# Patient Record
(demographics unavailable — no encounter records)

---

## 2019-08-09 NOTE — NUR
HAND-OFF: 

Report given to Isidoro Aleman RN. Patient resting comfortably, belonging sheet 
completed without patient signature due to dressing.

## 2019-08-09 NOTE — DIAGNOSTIC IMAGING REPORT
Indication: Headache

 

Technique: Continuous helical CT scanning of the head was performed utilizing

automated exposure control without intravenous contrast material. Axial and coronal

reconstructions were obtained.

 

Comparison: None

 

CT dose: Total DLP 1337.05 mGycm; CTDI vol 70.38 mGy

 

Findings: Evaluation limited due to patient motion. Note that subtle abnormalities

can be missed. Within these limitations: 

 

There is no acute intracranial hemorrhage, mass effect or cortical edema. No shift of

the midline structures. The ventricles, cisterns and sulci are prominent consistent

with atrophy. Periventricular hypoattenuation is seen, a nonspecific finding. There

is a more focal area of encephalomalacia in the right lateral frontal lobe which may

be related to chronic ischemia or injury. There is left supraorbital soft tissue

swelling. No depressed calvarial fracture. Mastoid air cells and paranasal sinuses

clear. Globes are symmetric. No infiltration of the conal fat bilaterally.

 

 

IMPRESSION: 

Exam limited by motion artifact. Within this limitations:

 

No evidence of acute intracranial hemorrhage, mass effect or cortical edema. MRI may

be obtained for more sensitive evaluation as clinically indicated.

 

Atrophy and nonspecific periventricular hypoattenuation suggestive of chronic

ischemic microvascular changes.

 

Likely chronic infarct in the right frontal lobe.

 

Left supraorbital soft tissue swelling. No depressed skull fracture.

 

This corresponds with the statrad preliminary report. 

 

 The CT scanner at [facility] is accredited by the [employer] College of

Radiology and the scans are performed using protocols designed to limit radiation

exposure to as low as reasonably achievable to attain images of sufficient resolution

adequate for diagnostic evaluation.

## 2019-08-09 NOTE — CONSULTATION
Consult Note


Consult Note





asked to eval for renal failure and anemia





Patient is a 84-year-old male who presents after increased finger pain.  

Patient reports having a recent fall.  He states he had injury approximately 1/

2 hours ago after tripping on a sidewalk.  He reports increased pain to the 

right hand ring finger.  He reports falling and hitting his head.  He denies 

any loss of consciousness.  He denies any headache.  He denies taking any 

anticoagulation.  He denies any numbness.  He reports having prior history of 

CVA as well as hypertension.  He states he has had an appendectomy in the past. 

patient reports having a prior vascular procedure due to CVA at [facility] in [place].Patient reports having increased shortness of breath.  He 

cannot state when this began.  He states he had been having increased trouble 

breathing for several days at least.He reports having prior left-sided weakness 

resulting from CVA.  He normally walks with a cane.





     No Known Allergies (Unverified , 8/9/19)








Hx Hypertension:  Yes


Hx Cerebrovascular Accident:  Yes





patient interviewed-


examined


data reviewed


Assessment/Plan





Renal Failure-


? acute obn top of chronic


Anemia


HTN


h/o CVA


Rt 4th finger laceration: reason for admission











Anemia farley


U Na


U Analysis


BP control


Avoid Nephrotoxics











Humberto De Jesus MD Aug 9, 2019 15:19

## 2019-08-09 NOTE — DIAGNOSTIC IMAGING REPORT
Indication:Elevated Bun and Creatinine.

 

Technique: Grayscale and duplex Doppler imaging of the kidneys performed.

 

Comparison: None

 

Findings:

 

Kidneys are echogenic. There are multiple cysts bilaterally. There is no

hydronephrosis.. The right kidney measures 9.4 cm. in length. The left kidney

measures 9.4 cm. in length.

 

The IVC is patent. Urinary bladder is unremarkable. Prostate is 4.4 x 3.1 x 4.7 cm

and appears heterogeneous.

 

IMPRESSION:

 

Medical renal disease

 

Prostate enlargement

## 2019-08-09 NOTE — DIAGNOSTIC IMAGING REPORT
Indication: Pain status post injury

 

Technique: XRAY Hand Complete R

 

Comparison: None

 

Findings: There is a dislocation at the fourth proximal interphalangeal joint. No

definite/displaced associated acute fracture. There are degenerative changes

involving the interphalangeal joints. Alignment of the carpal bones is intact. No

radiopaque foreign body.

 

Impression: Dislocation at the fourth proximal interphalangeal joint. No

definite/displaced associated acute fracture.

 

Osteoarthrosis.

 

This corresponds with the preliminary interpretation of the treating ER physician, as

documented in the electronic medical record.

## 2019-08-09 NOTE — NUR
ED Nurse Note:



arnold Trinh, RN accepting RN Javier is on the phone with doctor at this moment. he 
will call me back.

## 2019-08-09 NOTE — HISTORY AND PHYSICAL REPORT
DATE OF ADMISSION:  08/09/2019

CHIEF COMPLAINT:  Fall with sustained injury to the right hand.



HISTORY OF PRESENT ILLNESS:  This is an 84-year-old very delightful 

American gentleman with past medical history significant for hypertension,

history of CVA with left-sided weakness, and appendectomy, who has

presented to the emergency room complaining about the right fourth finger

pain after fall sustaining injury to the right hand.  The patient fell

about 30 minutes prior to arrival to the emergency room, trip and fall on

the sidewalk, hit his head.  Denies any loss of consciousness.  Denies any

bowel or urinary incontinence.  Denies any double vision.  He complained

about the shortness of breath, however, denies any chest pain, nausea, or

vomiting.  Shortly after initial evaluation in the emergency room, the

patient was admitted to the hospital with mechanical fall with right hand

fourth finger laceration and the PIP dislocation status post reduction.



PAST MEDICAL HISTORY/PAST SURGICAL HISTORY:  As above.  History of

hypertension, CVA with the left-sided weakness, and appendectomy.



MEDICATIONS AT HOME:  Significant for Norvasc 10 mg p.o. daily.



ALLERGIES:  No known drug allergies.



SOCIAL HISTORY:  Denies any smoking, alcohol, or drugs at this

time.



FAMILY HISTORY:  Noncontributory.



REVIEW OF SYSTEMS:  Mostly as above.  Denies any dysuria, frequency, or

hematuria.  Denies any hemoptysis or hematochezia.  Denies any suicidal or

homicidal ideation.



PHYSICAL EXAMINATION:

VITAL SIGNS:  On admission from the ER, temperature 98.1, pulse of 100,

respirations 14, and initial blood pressure 203/186, repeat blood pressure

was 196/80.

GENERAL:  The patient is awake and responsive, in no acute

distress.

HEAD AND NECK:  Pupils are reactive to light.  Extraocular movements

intact.

NECK:  Supple.  No JVD.

LUNGS:  Good air entry.  No wheezing or rales.

HEART:  Reveals S1, S2.  Distant heart sounds.  No gallops.

ABDOMEN:  Soft, nondistended, and nontender.  Positive bowel

sounds.

EXTREMITIES:  No cyanosis, clubbing, or edema.  Right hand fourth finger

with laceration and dislocation, finger has been reduced.

RECTAL/GENITOURINARY:  Refused and deferred.

PSYCHIATRIC:  Mood and affect is intact.



LABORATORY DATA:  On admission from the ER is significant for WBC of 7.8,

hemoglobin 9.2, hematocrit 29, and platelets is 218,000.  Sodium 140,

potassium 4.2, chloride 108, bicarbonate 24, BUN 20, and creatinine 1.9.

Glucose is 90.  Uric acid is 6.7.  Calcium is 8.8.  Total CK is 821.  PT

of 10, INR 1.0,  and PTT of 28.  The patient's x-ray of the hand was noted

to be dislocation of the fourth proximal interference joint.  No

definitive displaced or associated acute fracture or osteoarthritis.  The

patient had a CT of the head was done, noted to have limitation due to the

movement artifact.  No evidence of acute intracranial hemorrhage, mass

effect, or cortical edema.  MRI may be obtained for more sensitivity

evaluation, atrophy with nonspecific periventricular hypoattenuation

suggestive of the chronic ischemic microvascular disease, chronic

infarction in the right frontal lobe, and left supraorbital soft tissue

swelling.  No depressed skull fracture.  Chest x-ray, nonspecific mild

interstitial prominence, possible chronic.  No definitive focal

consolidation, pleural effusion, or pneumothorax.  Borderline

cardiomegaly, possibly exaggerated by AP technique, and bilateral hilum

prominence, most likely related to the cachectic pulmonary vasculature.



ASSESSMENT:

1. Status post mechanical fall with the right hand fourth finger

laceration as well as dislocation status post reduction.

2. Hypertension, uncontrolled.

3. History of CVA with left hemiparesis.

4. Chronic kidney disease.

5. Anemia.



PLAN:  Admit the patient to monitor unit.  We will follow up with Dr. Tristan consultation as well as Dr. Davila from Infectious Disease.

Follow up with 2D echo.  Code status is Full Code.  DVT prophylaxis with

heparin subcutaneous.  We will follow up with the anemia workup.  We will

hold off on aspirin at this time due to the patient's anemia.  Follow up

with the PT and OT evaluation.  Consider discharge to the nursing facility

once the patient is ready to go.









  ______________________________________________

  Frandy Vuong M.D.





DR:  BEREKET

D:  08/09/2019 14:19

T:  08/09/2019 22:47

JOB#:  [id number]

CC:

## 2019-08-09 NOTE — DIAGNOSTIC IMAGING REPORT
Indication: pain, dislocation s/p reduction

 

Technique: XRAY Hand Ltd 2v R

 

Comparison: Earlier the same day

 

Findings: Status post assess for reduction and splinting of the previously seen

dislocation at the fourth proximal interphalangeal joint. No definite/displaced acute

fractures identified. Additional findings unchanged from exam earlier today.

 

Impression: Successful reduction of the previously seen dislocation at the fourth

proximal interphalangeal joint. No definite/displaced acute fracture.

## 2019-08-09 NOTE — NUR
Social Work

This Sw received a consult due to patient is homeless. This SW met with patient who remains 
alert/oriented, making his own decisions. Patient remains ambulatory with cane and managing 
his own ADLs independently. Patient explains he moved here from [facility], stating he retired one 
year ago as a  (transported to [place] via [employer] bus). This SW 
recommended Board/Care placement and contacted Preeti () [facility] 
who explains she has openings and will evaluate patient on Monday AM. Patient receives 
$1800/month in Social Security. Preeti explains she can transport patient to the facility 
(will need to evaluate patient first). 



[facility] 

[address] [place] [address]



Pending P.T to evaluate, according to latonia Herrera. If patient requires SNF, please contact 
Preeti prior to Monday AM to inform. latonia Herrera informed.

## 2019-08-09 NOTE — CONSULTATION
DATE OF CONSULTATION:  08/09/2019

CONSULTING PHYSICIAN:  Jaycob Mcneal M.D.



REFERRING PHYSICIAN:  Frandy Vuong M.D.



CHIEF COMPLAINT:  Right index PIP joint dislocation.



HISTORY OF PRESENT ILLNESS:  The patient is a pleasant 84-year-old

gentleman, who sustained a mechanical fall, diagnosed with the dislocation

of the index finger with an open laceration.  He underwent irrigation and

debridement and primary closure and reduction in the ER.  Orthopedic

consultation obtained for further care and recommendation.



PAST MEDICAL HISTORY:  Significant for COPD.



PAST SURGICAL HISTORY:  Reviewed in the intake chart.



MEDICATIONS:  Reviewed in the intake chart.



ALLERGIES:  Reviewed in the intake chart.



SOCIAL HISTORY:  Reviewed in the intake chart.



PHYSICAL EXAMINATION:

GENERAL:  The patient is currently in the ER bed.  He is resting

comfortably in bed.  He is getting a breathing treatment.

EXTREMITIES:  Right hand examination shows a dressing in place.



IMAGING:  Preop pre-reduction films showed dorsal dislocation of the PIP

joint, index finger.  Postreduction films showed that the fracture is

reduced.



ASSESSMENT:  Right dorsal dislocation of PIP joint, index finger.



DISCUSSION:  At this point, it looks like the reduction was successful.  He

should be maintained on oral Keflex.  He needs follow up in about 10 to 14

days for suture removal.  Signs and symptoms for infection discussed with

the patient, particularly given his recent COPD exacerbation has increased

risk of infection, but I think it is pretty vascular and it should respond

with IV antibiotics.  I have discussed the case with Dr. Vuong.  Please

re-consult with me as needed during this admission if any new issues

arise.









  ______________________________________________

  Jaycob Mcneal M.D.





DR:  ATA

D:  08/09/2019 08:49

T:  08/09/2019 16:18

JOB#:  [id number]

CC:

## 2019-08-09 NOTE — NUR
ED Nurse Note:

Patient presents BIBA from homeless, with complaints of fall and injury to left 
hand. 

-------------------------------------------------------------------------------

Addendum: 08/09/19 at 0527 by JACKY

-------------------------------------------------------------------------------

ED Nurse Note:

Right hand, fourth finger.

## 2019-08-09 NOTE — NUR
ED Nurse Note:



Received patient in bed receiving breathing treatment by RT at the bedside. pt 
denied pain. aao x3-4 but fatigued. splint present from previous shift on Rt 
hand. bp high as documented. ERMD made aware.

## 2019-08-09 NOTE — EMERGENCY ROOM REPORT
History of Present Illness


General


Chief Complaint:  Upper Extremity Injury


Source:  Patient





Present Illness


HPI


Patient is a 84-year-old male who presents after increased finger pain.  

Patient reports having a recent fall.  He states he had injury approximately 1/

2 hours ago after tripping on a sidewalk.  He reports increased pain to the 

right hand ring finger.  He reports falling and hitting his head.  He denies 

any loss of consciousness.  He denies any headache.  He denies taking any 

anticoagulation.  He denies any numbness.  He reports having prior history of 

CVA as well as hypertension.  He states he has had an appendectomy in the past. 

patient reports having a prior vascular procedure due to CVA at [facility] in [place].Patient reports having increased shortness of breath.  He 

cannot state when this began.  He states he had been having increased trouble 

breathing for several days at least.He reports having prior left-sided weakness 

resulting from CVA.  He normally walks with a cane.


Allergies:  


Coded Allergies:  


     No Known Allergies (Unverified , 8/9/19)





Patient History


Reviewed Nursing Documentation:  PMH: Agreed; PSxH: Agreed





Nursing Documentation-PMH


Past Medical History:  No History, Except For


Hx Hypertension:  Yes


Hx Cerebrovascular Accident:  Yes





Review of Systems


Constitutional:  Denies: fever


ENT:  Reports: hearing loss - longstanding


Respiratory:  Denies: shortness of breath


Cardiovascular:  Denies: syncope


Gastrointestinal:  Denies: no symptoms, see HPI, nausea, vomiting


Skin:  Reports: other - laceration


Neurological:  Reports: no symptoms


Endocrine:  Denies: no symptoms, see HPI, excessive sweating, flushing, 

intolerance to temperature, increased thirst, increased urine, unexplained 

weight loss, other





Physical Exam





Vital Signs








  Date Time  Temp Pulse Resp B/P (MAP) Pulse Ox O2 Delivery O2 Flow Rate FiO2


 


8/9/19 05:01 98.1 100 14 203/186 (192) 98 Room Air  








General Appearance:  alert, GCS 15, Chronically Ill


Head:  normocephalic


ENT:  other - decreased hearing


Neck:  limited range of motion


Respiratory:  accessory muscle use, stridor


Cardiovascular #1:  normal inspection, regular rate, rhythm


Gastrointestinal:  normal inspection


Musculoskeletal:  other - kyphosis


Neurologic:  normal inspection, alert, oriented x3, responsive, motor weakness 

- left upper extremity slight weakness


Psychiatric:  normal inspection, judgement/insight normal


Skin:  normal color, laceration - finger laceration





Procedures


Joint Reduction


Joint Reduction :  


   Consent:  Emergent


   Joint Reduction Site:  other - finger


   Procedural Sedation:  No


   Reduction Attempts:  One


   Pre-Procedure NV Exam:  Yes


   Post-Procedure NV Exam:  Yes


   Post Joint Reduction Film:  joint reduced


   Patient Tolerated:  Well


   Complications:  None





Medical Decision Making


Diagnostic Impression:  


 Primary Impression:  


 Laceration of finger of right hand


 Additional Impressions:  


 Dislocated finger


 Stridor


 History of CVA (cerebrovascular accident)


ER Course


Patient is an 84-year-old male who presented after fall.  Differential 

diagnosis include was not limited to fracture, dislocation, contusion, strain 

among others.  Because of complexity of patient's case laboratory testing and 

imaging studies were ordered.  X-ray imaging of the right hand 3 views 

interpreted by me showed dislocation to the ring finger PIP joint.  Patient was 

noted to have laceration overlying this dislocation.  Laceration was irrigated 

copiously with sterile water.  Digital block was performed with 3 mL's of 0.5% 

bupivacaine.  Patient tolerated well.  Joint was reduced with axial traction.  

Wound was again irrigated.  Patient given IV Ancef.  He was noted to have some 

stridor and was given breathing treatment.Chest x-ray 1 view interpreted by me 

showed normal cardiac size with vascular congestion.  CT of the head read by 

radiology showed no evidence of acute fracture or intracranial hemorrhage.Dr. Frandy Vuong was contacted for inpatient management.  Dr.Raj Mcneal was contacted 

for orthopedic consult.





Labs








Test


  8/9/19


06:14


 


White Blood Count


  7.8 K/UL


(4.8-10.8)


 


Red Blood Count


  3.45 M/UL


(4.70-6.10)


 


Hemoglobin


  9.2 G/DL


(14.2-18.0)


 


Hematocrit


  29.8 %


(42.0-52.0)


 


Mean Corpuscular Volume 86 FL (80-99) 


 


Mean Corpuscular Hemoglobin


  26.8 PG


(27.0-31.0)


 


Mean Corpuscular Hemoglobin


Concent 31.0 G/DL


(32.0-36.0)


 


Red Cell Distribution Width


  14.0 %


(11.6-14.8)


 


Platelet Count


  218 K/UL


(150-450)


 


Mean Platelet Volume


  5.3 FL


(6.5-10.1)


 


Neutrophils (%) (Auto)


  72.4 %


(45.0-75.0)


 


Lymphocytes (%) (Auto)


  13.6 %


(20.0-45.0)


 


Monocytes (%) (Auto)


  9.3 %


(1.0-10.0)


 


Eosinophils (%) (Auto)


  3.6 %


(0.0-3.0)


 


Basophils (%) (Auto)


  1.1 %


(0.0-2.0)


 


Prothrombin Time


  10.2 SEC


(9.30-11.50)


 


Prothromb Time International


Ratio 1.0 (0.9-1.1) 


 


 


Activated Partial


Thromboplast Time 28 SEC (23-33) 


 


 


Sodium Level


  140 MMOL/L


(136-145)


 


Potassium Level


  4.2 MMOL/L


(3.5-5.1)


 


Chloride Level


  108 MMOL/L


()


 


Carbon Dioxide Level


  24 MMOL/L


(21-32)


 


Anion Gap


  8 mmol/L


(5-15)


 


Blood Urea Nitrogen


  20 mg/dL


(7-18)


 


Creatinine


  1.9 MG/DL


(0.55-1.30)


 


Estimat Glomerular Filtration


Rate  mL/min (>60) 


 


 


Glucose Level


  90 MG/DL


()


 


Calcium Level


  8.8 MG/DL


(8.5-10.1)


 


Total Bilirubin


  0.5 MG/DL


(0.2-1.0)


 


Aspartate Amino Transf


(AST/SGOT) 36 U/L (15-37) 


 


 


Alanine Aminotransferase


(ALT/SGPT) 27 U/L (12-78) 


 


 


Alkaline Phosphatase


  55 U/L


()


 


Total Protein


  7.0 G/DL


(6.4-8.2)


 


Albumin


  3.2 G/DL


(3.4-5.0)


 


Globulin 3.8 g/dL 


 


Albumin/Globulin Ratio 0.8 (1.0-2.7) 











Last Vital Signs








  Date Time  Temp Pulse Resp B/P (MAP) Pulse Ox O2 Delivery O2 Flow Rate FiO2


 


8/9/19 05:07 98.1 78 14 203/186 98 Room Air  








Status:  improved


Disposition:  ADMITTED AS INPATIENT


Condition:  Stable











Silvestre Hernandez MD Aug 9, 2019 05:27

## 2019-08-09 NOTE — CONSULTATION
History of Present Illness


General


Date patient seen:  Aug 9, 2019





Present Illness


HPI


 84-year-old male with hx of HTN, CVA with left sided weaknes , presented to  

after increased finger pain.  He states he had injury approximately 1/2 hours 

ago after tripping on a sidewalk.  He reports falling and hitting his head. 

Patient reports having increased shortness of breath.   He states he had been 

having increased trouble breathing for several days at least.  His  CXR showed 

possible infiltrate.  His labs revealed ATN as well.  He is admitted to 

telemetry for further evaluation.


Allergies:  


Coded Allergies:  


     No Known Allergies (Unverified , 8/9/19)





Medication History


Miscellaneous Medications


Unable to Obtain Medications (Unable To Obtain Meds), (Reported)





Patient History


Healthcare decision maker





Resuscitation status


Full Code


Advanced Directive on File








Past Medical/Surgical History


Past Medical/Surgical History:  


(1) History of hypertension


(2) Laceration of finger of right hand


(3) History of CVA (cerebrovascular accident)





Review of Systems


All Other Systems:  negative except mentioned in HPI





Physical Exam


General Appearance:  thin


Lines, tubes and drains:  peripheral, PICC


HEENT:  normocephalic, atraumatic


Neck:  non-tender, supple


Respiratory/Chest:  chest wall non-tender, lungs clear


Breasts:  no masses


Cardiovascular/Chest:  normal peripheral pulses


Abdomen:  normal bowel sounds


Genitourinary/Rectal:  normal genital exam


Extremities:  normal range of motion


Neurologic:  CNs II-XII grossly normal





Last 24 Hour Vital Signs








  Date Time  Temp Pulse Resp B/P (MAP) Pulse Ox O2 Delivery O2 Flow Rate FiO2


 


8/9/19 12:00 97.6 84 18 169/80 (109) 94   


 


8/9/19 10:53      Room Air  


 


8/9/19 08:51  73 13  100 Room Air  21 8/9/19 08:42  79  193/67    


 


8/9/19 08:26 98.3 79 18 193/66 100 Room Air  


 


8/9/19 08:24  72 16  100 Room Air  21 8/9/19 08:12  66 18   Room Air  21 8/9/19 08:12 98.3 66 18 199/60 99 Room Air  21 8/9/19 07:26 98.1 78 18 184/57 99 Room Air  21 8/9/19 06:01  73 20  99 Room Air  21


 


8/9/19 05:52  89 18  99 Room Air  21


 


8/9/19 05:52  89 18  99 Room Air  21


 


8/9/19 05:07 98.1 78 14 203/186 98 Room Air  


 


8/9/19 05:01 98.1 100 14 203/186 (192) 98 Room Air  











Laboratory Tests








Test


  8/9/19


06:14


 


White Blood Count


  7.8 K/UL


(4.8-10.8)


 


Red Blood Count


  3.45 M/UL


(4.70-6.10)  L


 


Hemoglobin


  9.2 G/DL


(14.2-18.0)  L


 


Hematocrit


  29.8 %


(42.0-52.0)  L


 


Mean Corpuscular Volume 86 FL (80-99)  


 


Mean Corpuscular Hemoglobin


  26.8 PG


(27.0-31.0)  L


 


Mean Corpuscular Hemoglobin


Concent 31.0 G/DL


(32.0-36.0)  L


 


Red Cell Distribution Width


  14.0 %


(11.6-14.8)


 


Platelet Count


  218 K/UL


(150-450)


 


Mean Platelet Volume


  5.3 FL


(6.5-10.1)  L


 


Neutrophils (%) (Auto)


  72.4 %


(45.0-75.0)


 


Lymphocytes (%) (Auto)


  13.6 %


(20.0-45.0)  L


 


Monocytes (%) (Auto)


  9.3 %


(1.0-10.0)


 


Eosinophils (%) (Auto)


  3.6 %


(0.0-3.0)  H


 


Basophils (%) (Auto)


  1.1 %


(0.0-2.0)


 


Prothrombin Time


  10.2 SEC


(9.30-11.50)


 


Prothromb Time International


Ratio 1.0 (0.9-1.1)  


 


 


Activated Partial


Thromboplast Time 28 SEC (23-33)


 


 


Sodium Level


  140 MMOL/L


(136-145)


 


Potassium Level


  4.2 MMOL/L


(3.5-5.1)


 


Chloride Level


  108 MMOL/L


()  H


 


Carbon Dioxide Level


  24 MMOL/L


(21-32)


 


Anion Gap


  8 mmol/L


(5-15)


 


Blood Urea Nitrogen


  20 mg/dL


(7-18)  H


 


Creatinine


  1.9 MG/DL


(0.55-1.30)  H


 


Estimat Glomerular Filtration


Rate  mL/min (>60)  


 


 


Glucose Level


  90 MG/DL


()


 


Calcium Level


  8.8 MG/DL


(8.5-10.1)


 


Total Bilirubin


  0.5 MG/DL


(0.2-1.0)


 


Aspartate Amino Transf


(AST/SGOT) 36 U/L (15-37)


 


 


Alanine Aminotransferase


(ALT/SGPT) 27 U/L (12-78)


 


 


Alkaline Phosphatase


  55 U/L


()


 


Total Protein


  7.0 G/DL


(6.4-8.2)


 


Albumin


  3.2 G/DL


(3.4-5.0)  L


 


Globulin 3.8 g/dL  


 


Albumin/Globulin Ratio


  0.8 (1.0-2.7)


L











Microbiology








 Date/Time


Source Procedure


Growth Status


 


 


 8/9/19 08:50


Rectum  Received








Height (Feet):  5


Height (Inches):  10.00


Weight (Pounds):  150


Medications





Current Medications








 Medications


  (Trade)  Dose


 Ordered  Sig/Arlen


 Route


 PRN Reason  Start Time


 Stop Time Status Last Admin


Dose Admin


 


 Acetaminophen


  (Tylenol)  650 mg  Q4H  PRN


 ORAL


 T>100.5  8/9/19 11:00


 9/8/19 10:59   


 


 


 Albuterol/


 Ipratropium


  (Albuterol/


 Ipratropium)  3 ml  Q4H  PRN


 HHN


 Shortness of Breath  8/9/19 11:00


 8/14/19 10:59   


 


 


 Dextrose


  (Dextrose 50%)  25 ml  Q30M  PRN


 IV


 Hypoglycemia  8/9/19 11:00


 9/8/19 10:59   


 


 


 Dextrose


  (Dextrose 50%)  50 ml  Q30M  PRN


 IV


 Hypoglycemia  8/9/19 11:00


 9/8/19 10:59   


 


 


 Heparin Sodium


  (Porcine)


  (Heparin 5000


 units/ml)  5,000 units  EVERY 12  HOURS


 SUBQ


   8/9/19 11:00


 9/8/19 10:59  8/9/19 11:30


 


 


 Morphine Sulfate


  (Morphine


 Sulfate)  2 mg  Q4H  PRN


 IVP


 Severe Pain (Pain Scale 7-10)  8/9/19 11:00


 8/16/19 10:59   


 


 


 Ondansetron HCl


  (Zofran)  4 mg  Q6H  PRN


 IVP


 Nausea & Vomiting  8/9/19 11:00


 9/8/19 10:59   


 


 


 Polyethylene


 Glycol


  (Miralax)  17 gm  DAILYPRN  PRN


 ORAL


 Constipation  8/9/19 11:00


 9/8/19 10:59   


 


 


 Vancomycin HCl


  (Vanco rx to


 dose)  1 ea  DAILY  PRN


 MISC


 .  8/9/19 11:15


 9/8/19 11:14   


 


 


 Vancomycin HCl/


 Dextrose  275 ml @ 


 137.5 mls/


 hr  ONCE  ONCE


 IVPB


   8/9/19 12:30


 8/9/19 14:29   


 











Assessment/Plan


Problem List:  


(1) History of hypertension


ICD Codes:  Z86.79 - Personal history of other diseases of the circulatory 

system


SNOMED:  819900485


(2) Pneumonia


ICD Codes:  J18.9 - Pneumonia, unspecified organism


SNOMED:  951809048


(3) History of CVA (cerebrovascular accident)


ICD Codes:  Z86.73 - Personal history of transient ischemic attack (TIA), and 

cerebral infarction without residual deficits


SNOMED:  910975588, 924841041


Assessment/Plan:


check sputum


iv abc


respiratory treatment


CT of chest to evaluate the abnormality on the CXR


social service consult


dvt prophylaxis


CT of neck to evaluate possible stridor in ER


Renal w/u











Kolton Tristan MD Aug 9, 2019 12:21

## 2019-08-09 NOTE — NUR
NURSE NOTES:



Received report from LILIANA Herrera. Pt is resting in bed. In no acute distress. IV line intact and 
patent. Bed in lowest position, call light within reach. Will continue plan of care.

## 2019-08-09 NOTE — DIAGNOSTIC IMAGING REPORT
Indication: Shortness of breath

 

Technique: XRAY Chest 1v

 

Comparison: None

 

Findings: Heart appears borderline enlarged. Hilar prominence is noted which is most

likely due to ectatic pulmonary vasculature. Atherosclerotic calcifications are noted

in a tortuous aorta. There is mild interstitial prominence, nonspecific. No definite

focal consolidation. No pleural effusion or pneumothorax. There are degenerative

changes in the spine. No acute osseous abnormality.

 

Impression: 

*  Nonspecific mild interstitial prominence, possibly chronic.

*  No definite focal consolidation, pleural effusion, pneumothorax or evidence of

alveolar edema.

*  Borderline cardiomegaly, possibly exaggerated by AP technique.

*  Bilateral hilar prominence most likely related to ectatic pulmonary vasculature.

Recommend routine follow-up chest CT to exclude the possibility of mass or

lymphadenopathy.

*  Atherosclerotic disease.

## 2019-08-09 NOTE — HISTORY & PHYSICAL
History and Physical


History & Physicial


Job# [id number]











Frandy Vuong MD Aug 9, 2019 14:22

## 2019-08-10 NOTE — NUR
NURSE NOTES:

Report received from LILIANA Whiteside. Patient asleep, easily awakened by voice. In RA. Denies any 
pain or SOB. Bed on lowest position, side rails upx2, brakes engaged. Call light within easy 
reach.

## 2019-08-10 NOTE — INFECTIOUS DISEASES PROG NOTE
Assessment/Plan


Assessment/Plan


Abx:


Cefepime x1 8/9


Ceftriaxone 8/9-


IV Vancomycin 8/9-





Assessment:


Afebrile


No leukocytosis





R 4th finger pan-2ry to PIP dislocation and laceration, s/p reduction


  -repeat R hand xray: Successful reduction of the previously seen dislocation 

at the fourth proximal interphalangeal joint. No definite/displaced acute 

fracture.


  -xray R hand:  Dislocation at the fourth proximal interphalangeal joint. No 

definite/displaced associated acute fracture.Osteoarthrosis.


 


s/p Fall


  -Head CT: Exam limited by motion artifact. Within this limitations:No 

evidence of acute intracranial hemorrhage, mass effect or cortical edema. MRI 

maybe obtained for more sensitive evaluation as clinically indicated. Atrophy 

and nonspecific periventricular hypoattenuation suggestive of chronic ischemic 

microvascular changes. Likely chronic infarct in the right frontal lobe. Left 

supraorbital soft tissue swelling. No depressed skull fracture.


 





Dyspnea


  -CXR:   Nonspecific mild interstitial prominence, possibly chronic. No 

definite focal consolidation, pleural effusion, pneumothorax or evidence of 

alveolar edema. Borderline cardiomegaly, possibly exaggerated by AP 

technique.Bilateral hilar prominence most likely related to ectatic pulmonary 

vasculature.








VIKASH, improving


   -u/a neg


   -Renal US:  Slightly increased echogenicity within the kidneys which can be 

seen in the setting of medical renal disease.  No hydronephrosis.  Cyst within 

the right renal upper pole.  No stones.  Unremarkable appearance of the 

bladder. 


 





CVA w L side weakness


HTN


 appendectomy





Plan:


-Continue prophylactic PO Keflex #2/3


    -8/9 IV Vancomycin, Ceftriaxone #1





-f/u cx


-Monitor CBC/CMP, temperatures


-wound care





Thank you for this consultation. Will continue to follow along with  you.





Discussed with RN.





Subjective


Allergies:  


Coded Allergies:  


     No Known Allergies (Unverified , 8/9/19)





Objective


Vital Signs





Last 24 Hour Vital Signs








  Date Time  Temp Pulse Resp B/P (MAP) Pulse Ox O2 Delivery O2 Flow Rate FiO2


 


8/10/19 09:29  83  153/82    


 


8/10/19 09:26  83  153/82    


 


8/10/19 09:00      Room Air  


 


8/10/19 08:00 98.4 83 18 153/82 (105) 95   


 


8/10/19 08:00  87      


 


8/10/19 06:45  82  164/79 (107)    


 


8/10/19 05:57    189/91    


 


8/10/19 04:00  71      


 


8/10/19 04:00 98.2 71 18 152/86 (108) 98   


 


8/10/19 00:00  75      


 


8/10/19 00:00 98.6 75 20 149/74 (99) 96   


 


8/9/19 21:22    109/70    


 


8/9/19 21:00      Room Air  


 


8/9/19 20:00 98.8 86 20 157/71 (99) 97   


 


8/9/19 20:00  86      


 


8/9/19 16:00 98.8 85 20 122/90 (101) 95   


 


8/9/19 16:00  84      


 


8/9/19 15:47  84  169/80    








Height (Feet):  5


Height (Inches):  10.00


Weight (Pounds):  150


Objective





General Appearance:  thin


Lines, tubes and drains:  peripheral, PICC


HEENT:  normocephalic, atraumatic


Neck:  non-tender, supple


Respiratory/Chest:  chest wall non-tender, lungs clear


Cardiovascular/Chest:  normal peripheral pulses


Abdomen:  normal bowel sounds


Extremities:  , laceration - R 4th finger laceration





Microbiology








 Date/Time


Source Procedure


Growth Status


 


 


 8/9/19 08:50


Rectum  Received











Laboratory Tests








Test


  8/9/19


15:22 8/10/19


06:25 8/10/19


07:15


 


Urine Color Pale yellow    


 


Urine Appearance Clear    


 


Urine pH 6 (4.5-8.0)    


 


Urine Specific Gravity


  1.005


(1.005-1.035) 


  


 


 


Urine Protein


  1+ (NEGATIVE)


H 


  


 


 


Urine Glucose (UA)


  Negative


(NEGATIVE) 


  


 


 


Urine Ketones


  1+ (NEGATIVE)


H 


  


 


 


Urine Blood


  Negative


(NEGATIVE) 


  


 


 


Urine Nitrite


  Negative


(NEGATIVE) 


  


 


 


Urine Bilirubin


  Negative


(NEGATIVE) 


  


 


 


Urine Urobilinogen


  Normal MG/DL


(0.0-1.0) 


  


 


 


Urine Leukocyte Esterase


  Negative


(NEGATIVE) 


  


 


 


Urine RBC


  0-2 /HPF (0 -


0)  H 


  


 


 


Urine WBC


  0-2 /HPF (0 -


0) 


  


 


 


Urine Squamous Epithelial


Cells None /LPF


(NONE/OCC) 


  


 


 


Urine Bacteria


  Occasional


/HPF (NONE) 


  


 


 


Urine Random Sodium


  91 mmol/L


() 


  


 


 


White Blood Count


  


  9.9 K/UL


(4.8-10.8) 


 


 


Red Blood Count


  


  3.39 M/UL


(4.70-6.10)  L 


 


 


Hemoglobin


  


  9.4 G/DL


(14.2-18.0)  L 


 


 


Hematocrit


  


  29.1 %


(42.0-52.0)  L 


 


 


Mean Corpuscular Volume  86 FL (80-99)   


 


Mean Corpuscular Hemoglobin


  


  27.6 PG


(27.0-31.0) 


 


 


Mean Corpuscular Hemoglobin


Concent 


  32.2 G/DL


(32.0-36.0) 


 


 


Red Cell Distribution Width


  


  13.8 %


(11.6-14.8) 


 


 


Platelet Count


  


  232 K/UL


(150-450) 


 


 


Mean Platelet Volume


  


  5.8 FL


(6.5-10.1)  L 


 


 


Neutrophils (%) (Auto)


  


  78.4 %


(45.0-75.0)  H 


 


 


Lymphocytes (%) (Auto)


  


  11.5 %


(20.0-45.0)  L 


 


 


Monocytes (%) (Auto)


  


  9.1 %


(1.0-10.0) 


 


 


Eosinophils (%) (Auto)


  


  0.4 %


(0.0-3.0) 


 


 


Basophils (%) (Auto)


  


  0.7 %


(0.0-2.0) 


 


 


Differential Total Cells


Counted 


  100  


  


 


 


Neutrophils % (Manual)  83 % (45-75)  H 


 


Lymphocytes % (Manual)  16 % (20-45)  L 


 


Monocytes % (Manual)  1 % (1-10)   


 


Eosinophils % (Manual)  0 % (0-3)   


 


Basophils % (Manual)  0 % (0-2)   


 


Band Neutrophils  0 % (0-8)   


 


Platelet Estimate  Adequate   


 


Platelet Morphology  Normal   


 


Red Blood Cell Morphology  Normal   


 


Erythrocyte Sedimentation Rate


  


  33 MM/HR


(0-20)  H 


 


 


Reticulocyte Count


  


  0.9 %


(0.5-2.0) 


 


 


Prothrombin Time


  


  10.2 SEC


(9.30-11.50) 10.2 SEC


(9.30-11.50)


 


Prothromb Time International


Ratio 


  1.0 (0.9-1.1)  


  1.0 (0.9-1.1)  


 


 


Activated Partial


Thromboplast Time 


  29 SEC (23-33)


  28 SEC (23-33)


 


 


Sodium Level


  


  144 MMOL/L


(136-145) 


 


 


Potassium Level


  


  4.3 MMOL/L


(3.5-5.1) 


 


 


Chloride Level


  


  111 MMOL/L


()  H 


 


 


Carbon Dioxide Level


  


  26 MMOL/L


(21-32) 


 


 


Anion Gap


  


  7 mmol/L


(5-15) 


 


 


Blood Urea Nitrogen


  


  20 mg/dL


(7-18)  H 


 


 


Creatinine


  


  1.7 MG/DL


(0.55-1.30)  H 


 


 


Estimat Glomerular Filtration


Rate 


   mL/min (>60)  


  


 


 


Glucose Level


  


  86 MG/DL


() 


 


 


Hemoglobin A1c


  


  6.1 %


(4.3-6.0)  H 


 


 


Uric Acid


  


  6.5 MG/DL


(2.6-7.2) 


 


 


Calcium Level


  


  8.3 MG/DL


(8.5-10.1)  L 


 


 


Phosphorus Level


  


  3.3 MG/DL


(2.5-4.9) 


 


 


Magnesium Level


  


  2.0 MG/DL


(1.8-2.4) 


 


 


Iron Level


  


  43 ug/dL


()  L 


 


 


Total Iron Binding Capacity


  


  193 ug/dL


(250-450)  L 


 


 


Percent Iron Saturation  22 % (15-50)   


 


Unsaturated Iron Binding


  


  150 ug/dL


(112-346) 


 


 


Total Bilirubin


  


  0.4 MG/DL


(0.2-1.0) 


 


 


Gamma Glutamyl Transpeptidase  8 U/L (5-85)   


 


Aspartate Amino Transf


(AST/SGOT) 


  29 U/L (15-37)


  


 


 


Alanine Aminotransferase


(ALT/SGPT) 


  25 U/L (12-78)


  


 


 


Alkaline Phosphatase


  


  47 U/L


() 


 


 


Lactate Dehydrogenase


  


  282 U/L


()  H 


 


 


Total Creatine Kinase


  


  553 U/L


()  H 


 


 


Total Protein


  


  6.3 G/DL


(6.4-8.2)  L 


 


 


Albumin


  


  2.7 G/DL


(3.4-5.0)  L 


 


 


Globulin  3.6 g/dL   


 


Albumin/Globulin Ratio


  


  0.8 (1.0-2.7)


L 


 


 


Carcinoembryonic Antigen  Pending   


 


Vitamin B12 Level


  


  257 PG/ML


(193-986) 


 


 


Folate


  


  10.9 NG/ML


(8.6-58.9) 


 


 


Thyroid Stimulating Hormone


(TSH) 


  2.276 uiU/mL


(0.358-3.740) 


 


 


Ferritin


  


  


  119 NG/ML


(8-388)











Current Medications








 Medications


  (Trade)  Dose


 Ordered  Sig/Arlen


 Route


 PRN Reason  Start Time


 Stop Time Status Last Admin


Dose Admin


 


 Acetaminophen


  (Tylenol)  650 mg  Q4H  PRN


 ORAL


 T>100.5  8/9/19 11:00


 9/8/19 10:59   


 


 


 Albuterol/


 Ipratropium


  (Albuterol/


 Ipratropium)  3 ml  Q4H  PRN


 HHN


 Shortness of Breath  8/9/19 11:00


 8/14/19 10:59   


 


 


 Amlodipine


 Besylate


  (Norvasc)  5 mg  BID


 ORAL


   8/10/19 09:00


 9/9/19 08:59  8/10/19 09:26


 


 


 Cephalexin


  (Keflex)  500 mg  EVERY 8  HOURS


 ORAL


   8/9/19 22:00


 8/16/19 21:59  8/10/19 05:53


 


 


 Clonidine HCl


  (Catapres Tab)  0.1 mg  Q4H  PRN


 ORAL


 SBP>160  8/10/19 06:59


 9/9/19 06:58   


 


 


 Dextrose


  (Dextrose 50%)  25 ml  Q30M  PRN


 IV


 Hypoglycemia  8/9/19 11:00


 9/8/19 10:59   


 


 


 Dextrose


  (Dextrose 50%)  50 ml  Q30M  PRN


 IV


 Hypoglycemia  8/9/19 11:00


 9/8/19 10:59   


 


 


 Folic Acid


  (Folate)  2 mg  DAILY


 ORAL


   8/10/19 09:15


 9/9/19 09:14  8/10/19 09:29


 


 


 Heparin Sodium


  (Porcine)


  (Heparin 5000


 units/ml)  5,000 units  EVERY 12  HOURS


 SUBQ


   8/9/19 11:00


 9/8/19 10:59  8/10/19 09:27


 


 


 Hydralazine HCl


  (Apresoline)  50 mg  Q8HR


 ORAL


   8/10/19 14:00


 9/8/19 17:59   


 


 


 Metoprolol


 Tartrate


  (Lopressor)  12.5 mg  Q12HR


 ORAL


   8/10/19 09:15


 9/9/19 09:14  8/10/19 09:29


 


 


 Morphine Sulfate


  (Morphine


 Sulfate)  2 mg  Q4H  PRN


 IVP


 Severe Pain (Pain Scale 7-10)  8/9/19 11:00


 8/16/19 10:59   


 


 


 Ondansetron HCl


  (Zofran)  4 mg  Q6H  PRN


 IVP


 Nausea & Vomiting  8/9/19 11:00


 9/8/19 10:59   


 


 


 Polyethylene


 Glycol


  (Miralax)  17 gm  DAILYPRN  PRN


 ORAL


 Constipation  8/9/19 11:00


 9/8/19 10:59   


 


 


 Sodium Chloride  1,000 ml @ 


 75 mls/hr  [id number] ONCE


 IV


   8/10/19 09:15


 8/10/19 22:34  8/10/19 09:30


 

















Alexa Davila M.D. Aug 10, 2019 12:06

## 2019-08-10 NOTE — NUR
NURSE NOTES:



Noted pt's BP was 189/91, gave Hydralazine dose as ordered. Checked BP again after 30 mins, 
BP decreased to 164/79. Notified Dr. Tristan who is in the unit. New orders noted and 
carried out. MD also said not to give anything else for now.

## 2019-08-10 NOTE — NUR
NURSE NOTES:

Received pt from LILIANA Calderón. Pt is awake and resting in bed in no acute distress. IV site 
intact and running. Bed locked in lowest position, call light within reach. Bed alarm on. 
Will continue with plan of care.

## 2019-08-10 NOTE — PULMONOLOGY PROGRESS NOTE
Assessment/Plan


Problems:  


(1) Pneumonia


(2) History of hypertension


(3) History of CVA (cerebrovascular accident)


Assessment/Plan


increase amlodipine


f/u on CT chest and neck


respiratory treatment


check electrolytes


dvt prophylaxis





Subjective


ROS Limited/Unobtainable:  No


Constitutional:  Reports: no symptoms


HEENT:  Repors: no symptoms


Respiratory:  Reports: no symptoms


Allergies:  


Coded Allergies:  


     No Known Allergies (Unverified , 8/9/19)





Objective





Last 24 Hour Vital Signs








  Date Time  Temp Pulse Resp B/P (MAP) Pulse Ox O2 Delivery O2 Flow Rate FiO2


 


8/10/19 05:57    189/91    


 


8/10/19 04:00  71      


 


8/10/19 04:00 98.2 71 18 152/86 (108) 98   


 


8/10/19 00:00  75      


 


8/10/19 00:00 98.6 75 20 149/74 (99) 96   


 


8/9/19 21:22    109/70    


 


8/9/19 21:00      Room Air  


 


8/9/19 20:00 98.8 86 20 157/71 (99) 97   


 


8/9/19 20:00  86      


 


8/9/19 16:00 98.8 85 20 122/90 (101) 95   


 


8/9/19 16:00  84      


 


8/9/19 15:47  84  169/80    


 


8/9/19 12:00  81      


 


8/9/19 12:00 97.6 84 18 169/80 (109) 94   


 


8/9/19 10:53      Room Air  


 


8/9/19 09:25 98.3 79 13 163/82 100 Room Air  21 8/9/19 08:51  73 13  100 Room Air  21


 


8/9/19 08:42  79  193/67    


 


8/9/19 08:26 98.3 79 18 193/66 100 Room Air  


 


8/9/19 08:24  72 16  100 Room Air  21


 


8/9/19 08:12  66 18   Room Air  21


 


8/9/19 08:12 98.3 66 18 199/60 99 Room Air  21


 


8/9/19 07:26 98.1 78 18 184/57 99 Room Air  21

















Intake and Output  


 


 8/9/19 8/10/19





 19:00 07:00


 


Intake Total 140 ml 


 


Balance 140 ml 


 


  


 


Intake Oral 140 ml 


 


# Voids  2








General Appearance:  WD/WN


Respiratory/Chest:  chest wall non-tender, normal breath sounds


Cardiovascular:  normal peripheral pulses, normal rate, regular rhythm


Abdomen:  normal bowel sounds, soft, non tender


Genitourinary:  normal external genitalia


Extremities:  no clubbing


Neurologic/Psychiatric:  CNs II-XII grossly normal


Lymphatic:  no neck adenopathy





Microbiology








 Date/Time


Source Procedure


Growth Status


 


 


 8/9/19 08:50


Rectum  Received








Laboratory Tests


8/9/19 15:22: 


Urine Color Pale yellow, Urine Appearance Clear, Urine pH 6, Urine Specific 

Gravity 1.005, Urine Protein 1+H, Urine Glucose (UA) Negative, Urine Ketones 1+H

, Urine Blood Negative, Urine Nitrite Negative, Urine Bilirubin Negative, Urine 

Urobilinogen Normal, Urine Leukocyte Esterase Negative, Urine RBC 0-2H, Urine 

WBC 0-2, Urine Squamous Epithelial Cells None, Urine Bacteria Occasional, Urine 

Random Sodium 91





Current Medications








 Medications


  (Trade)  Dose


 Ordered  Sig/Arlen


 Route


 PRN Reason  Start Time


 Stop Time Status Last Admin


Dose Admin


 


 Acetaminophen


  (Tylenol)  650 mg  Q4H  PRN


 ORAL


 T>100.5  8/9/19 11:00


 9/8/19 10:59   


 


 


 Albuterol/


 Ipratropium


  (Albuterol/


 Ipratropium)  3 ml  Q4H  PRN


 HHN


 Shortness of Breath  8/9/19 11:00


 8/14/19 10:59   


 


 


 Amlodipine


 Besylate


  (Norvasc)  5 mg  DAILY


 ORAL


   8/10/19 09:00


 9/9/19 08:59   


 


 


 Cephalexin


  (Keflex)  500 mg  EVERY 8  HOURS


 ORAL


   8/9/19 22:00


 8/16/19 21:59  8/10/19 05:53


 


 


 Dextrose


  (Dextrose 50%)  25 ml  Q30M  PRN


 IV


 Hypoglycemia  8/9/19 11:00


 9/8/19 10:59   


 


 


 Dextrose


  (Dextrose 50%)  50 ml  Q30M  PRN


 IV


 Hypoglycemia  8/9/19 11:00


 9/8/19 10:59   


 


 


 Heparin Sodium


  (Porcine)


  (Heparin 5000


 units/ml)  5,000 units  EVERY 12  HOURS


 SUBQ


   8/9/19 11:00


 9/8/19 10:59  8/9/19 21:23


 


 


 Hydralazine HCl


  (Apresoline)  50 mg  Q8HR


 ORAL


   8/9/19 22:00


 9/8/19 17:59  8/10/19 05:57


 


 


 Morphine Sulfate


  (Morphine


 Sulfate)  2 mg  Q4H  PRN


 IVP


 Severe Pain (Pain Scale 7-10)  8/9/19 11:00


 8/16/19 10:59   


 


 


 Ondansetron HCl


  (Zofran)  4 mg  Q6H  PRN


 IVP


 Nausea & Vomiting  8/9/19 11:00


 9/8/19 10:59   


 


 


 Polyethylene


 Glycol


  (Miralax)  17 gm  DAILYPRN  PRN


 ORAL


 Constipation  8/9/19 11:00


 9/8/19 10:59   


 

















Kolton Tristan MD Aug 10, 2019 06:58

## 2019-08-10 NOTE — CONSULTATION
DATE OF CONSULTATION:  08/10/2019

CARDIOLOGY CONSULTATION



CONSULTING PHYSICIAN:  Wali Oviedo M.D.



REFERRING PHYSICIANS:

1. Kolton Tristan M.D.

2. Frandy Vuong M.D.



REASON FOR REFERRAL:  Fall, questionable syncope.



HISTORY OF PRESENT ILLNESS:  This is an elderly gentleman, who is admitted

to the hospital because of a fall.  He indicates he actually was walking

on sidewalk, tripped and fell, hit his head and his finger, and had injury

sustained to those.  He absolutely denies any lightheadedness, syncope, or

near syncope.  He does get some lightheadedness occasionally when he does

stand up, but he indicates that that has nothing to do with his fall

yesterday.  He does not have any chest pain or pressure.  There is no PND.

No orthopnea.  He uses one pillow.  He is visiting.  He is here from [place]

[place].



PAST MEDICAL HISTORY:  Positive for high blood pressure and history of a

stroke.  No history of heart attack.  No cancer.  No hepatitis or

tuberculosis.  No asthma or emphysema.  No ulcers.  No kidney problems,

liver problems, thyroid problems, anemia, arthritis, HIV, AIDS, or blood

clots.  He does have some prostatic enlargement apparently.  He had a

stroke and he ended up having carotid endarterectomy on the right side.



ALLERGIES:  He has no known drug allergies.



SOCIAL HISTORY:  He used to smoke many years ago, does not anymore.  No

drinking alcoholic beverages.



REVIEW OF SYSTEMS:  GASTROINTESTINAL:  He denies.  GENITOURINARY:  He

denies.  PULMONARY:  He does have a cough _____.  CONSTITUTIONAL:

Negative.    NEUROLOGIC:  Left-sided weakness.



PHYSICAL EXAMINATION:

GENERAL:  Shows to be elderly gentleman, in no respiratory

distress.

HEENT:  Unremarkable.

NECK:  Supple.  There is a carotid scar on the right side of carotid

area.

LUNGS:  Clear.

CARDIAC:  Regular rate and rhythm.  No heaves or thrills.

ABDOMEN:  Soft, nontender.  Positive bowel sounds.

EXTREMITIES:  Upper extremities, less and lower extremities seem to be

fine.



LABORATORY VALUES:  White count of 9.9, hemoglobin of 9.4, and platelet

count of 232,000.  Sodium is 144, potassium 4.3, chloride 111, bicarb of

26, BUN of 20, creatinine of 1.7 down from 1.9, and A1c of 6.1.  Uric acid

was 6.5.  B12 is 218.  TSH of 2.2.  Folic acid of 8.  Albumin of 2.7.

Total .  LDH of 282.  His coags, INR of 1 and PTT of 28.  He had a

CT scan of his head that showed no evidence of acute intracranial

hemorrhage, mass effect, or atrophy; nonspecific periventricular

hypoattenuation, chronic infarct of the right frontal lobe.  He had an

echocardiogram apparently _____ mild-to-moderate aortic regurgitation, the

aortic valve area of 1.4, peak of 19 and mean of 10 mmHg across the aortic

valve.  His telemetry is showing sinus rhythm.  There are no

electrocardiograms for review at this time.



ASSESSMENT AND PLAN:

1. Non-syncopal fall.

2. Right dorsal dislocation of PIP joint of the index finger.

3. History of CVA with carotid endarterectomy.

4. History of hypertension.



Dr. Tristan and Dr. Vuong, this patient was seen in cardiac

consultation.  No syncope is noted.  Telemetry is unremarkable.  An EKG

will be ordered.  Otherwise, I doubt that there was major cardiac

component to this fall.  His blood pressure is elevated at this time and

his home medications include amlodipine that has already been started

although he still appears to be somewhat poorly controlled.  He will not

be receiving ACE inhibitors in light of his renal insufficiency.  Dr. De Jesus has seen the patient.  Hydralazine had been started and we will

follow the patient along.  In light of the fact that he has had history of

a stroke, he should be on some statins as well as some aspirin, which I

will write for.









  ______________________________________________

  Wali Oviedo M.D.





DR:  OVIDIO

D:  08/10/2019 10:34

T:  08/10/2019 20:21

JOB#:  [id number]

CC:

## 2019-08-10 NOTE — INTERNAL MED PROGRESS NOTE
Subjective


Date of Service:  Aug 10, 2019


Physician Name


Piero Harman


Attending Physician


Frandy Vuong MD





Current Medications








 Medications


  (Trade)  Dose


 Ordered  Sig/Arlen


 Route


 PRN Reason  Start Time


 Stop Time Status Last Admin


Dose Admin


 


 Acetaminophen


  (Tylenol)  650 mg  Q4H  PRN


 ORAL


 T>100.5  8/9/19 11:00


 9/8/19 10:59   


 


 


 Albuterol/


 Ipratropium


  (Albuterol/


 Ipratropium)  3 ml  Q4H  PRN


 HHN


 Shortness of Breath  8/9/19 11:00


 8/14/19 10:59   


 


 


 Amlodipine


 Besylate


  (Norvasc)  5 mg  BID


 ORAL


   8/10/19 09:00


 9/9/19 08:59  8/10/19 09:26


 


 


 Cephalexin


  (Keflex)  500 mg  EVERY 8  HOURS


 ORAL


   8/9/19 22:00


 8/16/19 21:59  8/10/19 14:21


 


 


 Clonidine HCl


  (Catapres Tab)  0.1 mg  Q4H  PRN


 ORAL


 SBP>160  8/10/19 06:59


 9/9/19 06:58   


 


 


 Dextrose


  (Dextrose 50%)  25 ml  Q30M  PRN


 IV


 Hypoglycemia  8/9/19 11:00


 9/8/19 10:59   


 


 


 Dextrose


  (Dextrose 50%)  50 ml  Q30M  PRN


 IV


 Hypoglycemia  8/9/19 11:00


 9/8/19 10:59   


 


 


 Folic Acid


  (Folate)  2 mg  DAILY


 ORAL


   8/10/19 09:15


 9/9/19 09:14  8/10/19 09:29


 


 


 Heparin Sodium


  (Porcine)


  (Heparin 5000


 units/ml)  5,000 units  EVERY 12  HOURS


 SUBQ


   8/9/19 11:00


 9/8/19 10:59  8/10/19 09:27


 


 


 Hydralazine HCl


  (Apresoline)  50 mg  Q8HR


 ORAL


   8/10/19 14:00


 9/8/19 17:59  8/10/19 14:24


 


 


 Metoprolol


 Tartrate


  (Lopressor)  12.5 mg  Q12HR


 ORAL


   8/10/19 09:15


 9/9/19 09:14  8/10/19 09:29


 


 


 Morphine Sulfate


  (Morphine


 Sulfate)  2 mg  Q4H  PRN


 IVP


 Severe Pain (Pain Scale 7-10)  8/9/19 11:00


 8/16/19 10:59   


 


 


 Ondansetron HCl


  (Zofran)  4 mg  Q6H  PRN


 IVP


 Nausea & Vomiting  8/9/19 11:00


 9/8/19 10:59   


 


 


 Polyethylene


 Glycol


  (Miralax)  17 gm  DAILYPRN  PRN


 ORAL


 Constipation  8/9/19 11:00


 9/8/19 10:59   


 


 


 Sodium Chloride  1,000 ml @ 


 75 mls/hr  [id number] ONCE


 IV


   8/10/19 09:15


 8/10/19 22:34  8/10/19 09:30


 








Allergies:  


Coded Allergies:  


     No Known Allergies (Unverified , 8/9/19)


Subjective


83 YO M admitted after mechanical fall.  Now fractured finger.  Cover for Int 

Layo-Dr Vuong





Objective





Last Vital Signs








  Date Time  Temp Pulse Resp B/P (MAP) Pulse Ox O2 Delivery O2 Flow Rate FiO2


 


8/10/19 14:24    156/65    


 


8/10/19 09:29  83      


 


8/10/19 09:00      Room Air  


 


8/10/19 08:00 98.4  18  95   


 


8/9/19 09:25        21











Laboratory Tests








Test


  8/10/19


06:25 8/10/19


07:15


 


White Blood Count


  9.9 K/UL


(4.8-10.8) 


 


 


Red Blood Count


  3.39 M/UL


(4.70-6.10)  L 


 


 


Hemoglobin


  9.4 G/DL


(14.2-18.0)  L 


 


 


Hematocrit


  29.1 %


(42.0-52.0)  L 


 


 


Mean Corpuscular Volume 86 FL (80-99)   


 


Mean Corpuscular Hemoglobin


  27.6 PG


(27.0-31.0) 


 


 


Mean Corpuscular Hemoglobin


Concent 32.2 G/DL


(32.0-36.0) 


 


 


Red Cell Distribution Width


  13.8 %


(11.6-14.8) 


 


 


Platelet Count


  232 K/UL


(150-450) 


 


 


Mean Platelet Volume


  5.8 FL


(6.5-10.1)  L 


 


 


Neutrophils (%) (Auto)


  78.4 %


(45.0-75.0)  H 


 


 


Lymphocytes (%) (Auto)


  11.5 %


(20.0-45.0)  L 


 


 


Monocytes (%) (Auto)


  9.1 %


(1.0-10.0) 


 


 


Eosinophils (%) (Auto)


  0.4 %


(0.0-3.0) 


 


 


Basophils (%) (Auto)


  0.7 %


(0.0-2.0) 


 


 


Differential Total Cells


Counted 100  


  


 


 


Neutrophils % (Manual) 83 % (45-75)  H 


 


Lymphocytes % (Manual) 16 % (20-45)  L 


 


Monocytes % (Manual) 1 % (1-10)   


 


Eosinophils % (Manual) 0 % (0-3)   


 


Basophils % (Manual) 0 % (0-2)   


 


Band Neutrophils 0 % (0-8)   


 


Platelet Estimate Adequate   


 


Platelet Morphology Normal   


 


Red Blood Cell Morphology Normal   


 


Erythrocyte Sedimentation Rate


  33 MM/HR


(0-20)  H 


 


 


Reticulocyte Count


  0.9 %


(0.5-2.0) 


 


 


Prothrombin Time


  10.2 SEC


(9.30-11.50) 10.2 SEC


(9.30-11.50)


 


Prothromb Time International


Ratio 1.0 (0.9-1.1)  


  1.0 (0.9-1.1)  


 


 


Activated Partial


Thromboplast Time 29 SEC (23-33)


  28 SEC (23-33)


 


 


Sodium Level


  144 MMOL/L


(136-145) 


 


 


Potassium Level


  4.3 MMOL/L


(3.5-5.1) 


 


 


Chloride Level


  111 MMOL/L


()  H 


 


 


Carbon Dioxide Level


  26 MMOL/L


(21-32) 


 


 


Anion Gap


  7 mmol/L


(5-15) 


 


 


Blood Urea Nitrogen


  20 mg/dL


(7-18)  H 


 


 


Creatinine


  1.7 MG/DL


(0.55-1.30)  H 


 


 


Estimat Glomerular Filtration


Rate  mL/min (>60)  


  


 


 


Glucose Level


  86 MG/DL


() 


 


 


Hemoglobin A1c


  6.1 %


(4.3-6.0)  H 


 


 


Uric Acid


  6.5 MG/DL


(2.6-7.2) 


 


 


Calcium Level


  8.3 MG/DL


(8.5-10.1)  L 


 


 


Phosphorus Level


  3.3 MG/DL


(2.5-4.9) 


 


 


Magnesium Level


  2.0 MG/DL


(1.8-2.4) 


 


 


Iron Level


  43 ug/dL


()  L 


 


 


Total Iron Binding Capacity


  193 ug/dL


(250-450)  L 


 


 


Percent Iron Saturation 22 % (15-50)   


 


Unsaturated Iron Binding


  150 ug/dL


(112-346) 


 


 


Total Bilirubin


  0.4 MG/DL


(0.2-1.0) 


 


 


Gamma Glutamyl Transpeptidase 8 U/L (5-85)   


 


Aspartate Amino Transf


(AST/SGOT) 29 U/L (15-37)


  


 


 


Alanine Aminotransferase


(ALT/SGPT) 25 U/L (12-78)


  


 


 


Alkaline Phosphatase


  47 U/L


() 


 


 


Lactate Dehydrogenase


  282 U/L


()  H 


 


 


Total Creatine Kinase


  553 U/L


()  H 


 


 


Total Protein


  6.3 G/DL


(6.4-8.2)  L 


 


 


Albumin


  2.7 G/DL


(3.4-5.0)  L 


 


 


Globulin 3.6 g/dL   


 


Albumin/Globulin Ratio


  0.8 (1.0-2.7)


L 


 


 


Carcinoembryonic Antigen Pending   


 


Vitamin B12 Level


  257 PG/ML


(193-986) 


 


 


Folate


  10.9 NG/ML


(8.6-58.9) 


 


 


Thyroid Stimulating Hormone


(TSH) 2.276 uiU/mL


(0.358-3.740) 


 


 


Ferritin


  


  119 NG/ML


(8-388)











Microbiology








 Date/Time


Source Procedure


Growth Status


 


 


 8/9/19 08:50


Rectum  Received

















Intake and Output  


 


 8/9/19 8/10/19





 18:59 06:59


 


Intake Total 140 ml 


 


Balance 140 ml 


 


  


 


Intake Oral 140 ml 


 


# Voids  2








Objective


PHYSICAL EXAMINATION:


GENERAL:  The patient is awake and responsive, in no acute


distress.


HEAD AND NECK:  Pupils are reactive to light.  Extraocular movements


intact.


NECK:  Supple.  No JVD.


LUNGS:  Good air entry.  No wheezing or rales.


HEART:  Reveals S1, S2.  Distant heart sounds.  No gallops.


ABDOMEN:  Soft, nondistended, and nontender.  Positive bowel


sounds.


EXTREMITIES:  No cyanosis, clubbing, or edema.  Right hand fourth finger


with laceration and dislocation, finger has been reduced.


RECTAL/GENITOURINARY:  Refused and deferred.


PSYCHIATRIC:  Mood and affect is intact.





Assessment/Plan


Assessment/Plan


ASSESSMENT:


1. Status post mechanical fall with the right hand fourth finger


laceration as well as dislocation status post reduction.


2. Hypertension, uncontrolled.


3. History of CVA with left hemiparesis.


4. Chronic kidney disease.


5. Anemia.





PLAN:  


1.  Admit the patient to monitor unit.


2.   Dr. Tristan pulmonary consultation as well as 


3.  Dr. Davila from Infectious Disease.


4.  Follow up with 2D echo.  


5.  Code status is Full Code.  


6.  DVT prophylaxis with heparin subcutaneous.  We will follow up with the 


7.  anemia workup in progress.  We will hold aspirin at this time due to the 

patient's anemia.  Follow up


with the PT and OT evaluation. 


8.  discharge planning:  skilled nursing facility














  _____











Piero Harman MD Aug 10, 2019 17:05

## 2019-08-10 NOTE — CARDIOLOGY PROGRESS NOTE
Assessment/Plan


Assessment/Plan


nonsyncopal fall 


hx of cva an cea 





will start on statin and if no surgey will need ecotrin started 


[id number]





Objective





Last 24 Hour Vital Signs








  Date Time  Temp Pulse Resp B/P (MAP) Pulse Ox O2 Delivery O2 Flow Rate FiO2


 


8/10/19 09:29  83  153/82    


 


8/10/19 09:26  83  153/82    


 


8/10/19 09:00      Room Air  


 


8/10/19 08:00 98.4 83 18 153/82 (105) 95   


 


8/10/19 06:45  82  164/79 (107)    


 


8/10/19 05:57    189/91    


 


8/10/19 04:00  71      


 


8/10/19 04:00 98.2 71 18 152/86 (108) 98   


 


8/10/19 00:00  75      


 


8/10/19 00:00 98.6 75 20 149/74 (99) 96   


 


8/9/19 21:22    109/70    


 


8/9/19 21:00      Room Air  


 


8/9/19 20:00 98.8 86 20 157/71 (99) 97   


 


8/9/19 20:00  86      


 


8/9/19 16:00 98.8 85 20 122/90 (101) 95   


 


8/9/19 16:00  84      


 


8/9/19 15:47  84  169/80    


 


8/9/19 12:00  81      


 


8/9/19 12:00 97.6 84 18 169/80 (109) 94   


 


8/9/19 10:53      Room Air  

















Intake and Output  


 


 8/9/19 8/10/19





 18:59 06:59


 


Intake Total 140 ml 


 


Balance 140 ml 


 


  


 


Intake Oral 140 ml 


 


# Voids  2











Laboratory Tests








Test


  8/9/19


15:22 8/10/19


06:25 8/10/19


07:15


 


Urine Color Pale yellow    


 


Urine Appearance Clear    


 


Urine pH 6 (4.5-8.0)    


 


Urine Specific Gravity


  1.005


(1.005-1.035) 


  


 


 


Urine Protein


  1+ (NEGATIVE)


H 


  


 


 


Urine Glucose (UA)


  Negative


(NEGATIVE) 


  


 


 


Urine Ketones


  1+ (NEGATIVE)


H 


  


 


 


Urine Blood


  Negative


(NEGATIVE) 


  


 


 


Urine Nitrite


  Negative


(NEGATIVE) 


  


 


 


Urine Bilirubin


  Negative


(NEGATIVE) 


  


 


 


Urine Urobilinogen


  Normal MG/DL


(0.0-1.0) 


  


 


 


Urine Leukocyte Esterase


  Negative


(NEGATIVE) 


  


 


 


Urine RBC


  0-2 /HPF (0 -


0)  H 


  


 


 


Urine WBC


  0-2 /HPF (0 -


0) 


  


 


 


Urine Squamous Epithelial


Cells None /LPF


(NONE/OCC) 


  


 


 


Urine Bacteria


  Occasional


/HPF (NONE) 


  


 


 


Urine Random Sodium


  91 mmol/L


() 


  


 


 


White Blood Count


  


  9.9 K/UL


(4.8-10.8) 


 


 


Red Blood Count


  


  3.39 M/UL


(4.70-6.10)  L 


 


 


Hemoglobin


  


  9.4 G/DL


(14.2-18.0)  L 


 


 


Hematocrit


  


  29.1 %


(42.0-52.0)  L 


 


 


Mean Corpuscular Volume  86 FL (80-99)   


 


Mean Corpuscular Hemoglobin


  


  27.6 PG


(27.0-31.0) 


 


 


Mean Corpuscular Hemoglobin


Concent 


  32.2 G/DL


(32.0-36.0) 


 


 


Red Cell Distribution Width


  


  13.8 %


(11.6-14.8) 


 


 


Platelet Count


  


  232 K/UL


(150-450) 


 


 


Mean Platelet Volume


  


  5.8 FL


(6.5-10.1)  L 


 


 


Neutrophils (%) (Auto)


  


  78.4 %


(45.0-75.0)  H 


 


 


Lymphocytes (%) (Auto)


  


  11.5 %


(20.0-45.0)  L 


 


 


Monocytes (%) (Auto)


  


  9.1 %


(1.0-10.0) 


 


 


Eosinophils (%) (Auto)


  


  0.4 %


(0.0-3.0) 


 


 


Basophils (%) (Auto)


  


  0.7 %


(0.0-2.0) 


 


 


Differential Total Cells


Counted 


  100  


  


 


 


Neutrophils % (Manual)  83 % (45-75)  H 


 


Lymphocytes % (Manual)  16 % (20-45)  L 


 


Monocytes % (Manual)  1 % (1-10)   


 


Eosinophils % (Manual)  0 % (0-3)   


 


Basophils % (Manual)  0 % (0-2)   


 


Band Neutrophils  0 % (0-8)   


 


Platelet Estimate  Adequate   


 


Platelet Morphology  Normal   


 


Red Blood Cell Morphology  Normal   


 


Erythrocyte Sedimentation Rate  Pending   


 


Reticulocyte Count  Pending   


 


Prothrombin Time


  


  10.2 SEC


(9.30-11.50) 10.2 SEC


(9.30-11.50)


 


Prothromb Time International


Ratio 


  1.0 (0.9-1.1)  


  1.0 (0.9-1.1)  


 


 


Activated Partial


Thromboplast Time 


  29 SEC (23-33)


  28 SEC (23-33)


 


 


Sodium Level


  


  144 MMOL/L


(136-145) 


 


 


Potassium Level


  


  4.3 MMOL/L


(3.5-5.1) 


 


 


Chloride Level


  


  111 MMOL/L


()  H 


 


 


Carbon Dioxide Level


  


  26 MMOL/L


(21-32) 


 


 


Anion Gap


  


  7 mmol/L


(5-15) 


 


 


Blood Urea Nitrogen


  


  20 mg/dL


(7-18)  H 


 


 


Creatinine


  


  1.7 MG/DL


(0.55-1.30)  H 


 


 


Estimat Glomerular Filtration


Rate 


   mL/min (>60)  


  


 


 


Glucose Level


  


  86 MG/DL


() 


 


 


Hemoglobin A1c


  


  6.1 %


(4.3-6.0)  H 


 


 


Uric Acid


  


  6.5 MG/DL


(2.6-7.2) 


 


 


Calcium Level


  


  8.3 MG/DL


(8.5-10.1)  L 


 


 


Phosphorus Level


  


  3.3 MG/DL


(2.5-4.9) 


 


 


Magnesium Level


  


  2.0 MG/DL


(1.8-2.4) 


 


 


Iron Level


  


  43 ug/dL


()  L 


 


 


Total Iron Binding Capacity


  


  193 ug/dL


(250-450)  L 


 


 


Percent Iron Saturation  22 % (15-50)   


 


Unsaturated Iron Binding


  


  150 ug/dL


(112-346) 


 


 


Total Bilirubin


  


  0.4 MG/DL


(0.2-1.0) 


 


 


Gamma Glutamyl Transpeptidase  8 U/L (5-85)   


 


Aspartate Amino Transf


(AST/SGOT) 


  29 U/L (15-37)


  


 


 


Alanine Aminotransferase


(ALT/SGPT) 


  25 U/L (12-78)


  


 


 


Alkaline Phosphatase


  


  47 U/L


() 


 


 


Lactate Dehydrogenase


  


  282 U/L


()  H 


 


 


Total Creatine Kinase


  


  553 U/L


()  H 


 


 


Total Protein


  


  6.3 G/DL


(6.4-8.2)  L 


 


 


Albumin


  


  2.7 G/DL


(3.4-5.0)  L 


 


 


Globulin  3.6 g/dL   


 


Albumin/Globulin Ratio


  


  0.8 (1.0-2.7)


L 


 


 


Carcinoembryonic Antigen  Pending   


 


Vitamin B12 Level


  


  257 PG/ML


(193-986) 


 


 


Folate


  


  10.9 NG/ML


(8.6-58.9) 


 


 


Thyroid Stimulating Hormone


(TSH) 


  2.276 uiU/mL


(0.358-3.740) 


 


 


Ferritin


  


  


  119 NG/ML


(8-388)











Microbiology








 Date/Time


Source Procedure


Growth Status


 


 


 8/9/19 08:50


Rectum  Received

















Wali Oviedo MD Aug 10, 2019 10:32

## 2019-08-10 NOTE — NEPHROLOGY PROGRESS NOTE
Assessment/Plan


Problem List:  


(1) Renal failure (ARF), acute on chronic


(2) History of hypertension


(3) Anemia


(4) History of CVA (cerebrovascular accident)


Assessment





Renal Failure-


? acute obn top of chronic


Anemia


HTN


h/o CVA


Rt 4th finger laceration: reason for admission


Plan








Anemia farley bit of low folate


U Na


U Analysis


BP control, further adjustment done


Avoid Nephrotoxics





Subjective


ROS Limited/Unobtainable:  No


Constitutional:  Reports: malaise





Objective


Objective





Last 24 Hour Vital Signs








  Date Time  Temp Pulse Resp B/P (MAP) Pulse Ox O2 Delivery O2 Flow Rate FiO2


 


8/10/19 08:00 98.4 83 18 153/82 (105) 95   


 


8/10/19 06:45  82  164/79 (107)    


 


8/10/19 05:57    189/91    


 


8/10/19 04:00  71      


 


8/10/19 04:00 98.2 71 18 152/86 (108) 98   


 


8/10/19 00:00  75      


 


8/10/19 00:00 98.6 75 20 149/74 (99) 96   


 


8/9/19 21:22    109/70    


 


8/9/19 21:00      Room Air  


 


8/9/19 20:00 98.8 86 20 157/71 (99) 97   


 


8/9/19 20:00  86      


 


8/9/19 16:00 98.8 85 20 122/90 (101) 95   


 


8/9/19 16:00  84      


 


8/9/19 15:47  84  169/80    


 


8/9/19 12:00  81      


 


8/9/19 12:00 97.6 84 18 169/80 (109) 94   


 


8/9/19 10:53      Room Air  


 


8/9/19 09:25 98.3 79 13 163/82 100 Room Air  21

















Intake and Output  


 


 8/9/19 8/10/19





 19:00 07:00


 


Intake Total 140 ml 


 


Balance 140 ml 


 


  


 


Intake Oral 140 ml 


 


# Voids  2








Laboratory Tests


8/9/19 15:22: 


Urine Color Pale yellow, Urine Appearance Clear, Urine pH 6, Urine Specific 

Gravity 1.005, Urine Protein 1+H, Urine Glucose (UA) Negative, Urine Ketones 1+H

, Urine Blood Negative, Urine Nitrite Negative, Urine Bilirubin Negative, Urine 

Urobilinogen Normal, Urine Leukocyte Esterase Negative, Urine RBC 0-2H, Urine 

WBC 0-2, Urine Squamous Epithelial Cells None, Urine Bacteria Occasional, Urine 

Random Sodium 91


8/10/19 06:25: 


White Blood Count 9.9, Red Blood Count 3.39L, Hemoglobin 9.4L, Hematocrit 29.1L

, Mean Corpuscular Volume 86, Mean Corpuscular Hemoglobin 27.6, Mean 

Corpuscular Hemoglobin Concent 32.2, Red Cell Distribution Width 13.8, Platelet 

Count 232, Mean Platelet Volume 5.8L, Neutrophils (%) (Auto) 78.4H, Lymphocytes 

(%) (Auto) 11.5L, Monocytes (%) (Auto) 9.1, Eosinophils (%) (Auto) 0.4, 

Basophils (%) (Auto) 0.7, Neutrophils % (Manual) [Pending], Lymphocytes % (

Manual) [Pending], Platelet Estimate [Pending], Platelet Morphology [Pending], 

Erythrocyte Sedimentation Rate [Pending], Reticulocyte Count [Pending], 

Prothrombin Time 10.2, Prothromb Time International Ratio 1.0, Activated 

Partial Thromboplast Time 29, Sodium Level 144, Potassium Level 4.3, Chloride 

Level 111H, Carbon Dioxide Level 26, Anion Gap 7, Blood Urea Nitrogen 20H, 

Creatinine 1.7H, Estimat Glomerular Filtration Rate , Glucose Level 86, 

Hemoglobin A1c 6.1H, Uric Acid 6.5, Calcium Level 8.3L, Phosphorus Level 3.3, 

Magnesium Level 2.0, Iron Level 43L, Total Iron Binding Capacity 193L, Percent 

Iron Saturation 22, Unsaturated Iron Binding 150, Total Bilirubin 0.4, Gamma 

Glutamyl Transpeptidase 8, Aspartate Amino Transf (AST/SGOT) 29, Alanine 

Aminotransferase (ALT/SGPT) 25, Alkaline Phosphatase 47, Lactate Dehydrogenase 

282H, Total Creatine Kinase 553H, Total Protein 6.3L, Albumin 2.7L, Globulin 3.6

, Albumin/Globulin Ratio 0.8L, Carcinoembryonic Antigen [Pending], Vitamin B12 

Level 257, Folate 10.9, Thyroid Stimulating Hormone (TSH) 2.276


8/10/19 07:15: 


Prothrombin Time 10.2, Prothromb Time International Ratio 1.0, Activated 

Partial Thromboplast Time 28


Height (Feet):  5


Height (Inches):  10.00


Weight (Pounds):  150


General Appearance:  no apparent distress


Neck:  limited range of motion


Cardiovascular:  normal rate


Respiratory/Chest:  decreased breath sounds


Abdomen:  soft


Extremities:  other - injured fingred dressed











Humberto De Jesus MD Aug 10, 2019 09:14

## 2019-08-11 NOTE — INTERNAL MED PROGRESS NOTE
Subjective


Date of Service:  Aug 11, 2019


Physician Name


HarmanPiero


Attending Physician


Frandy Vuong MD





Current Medications








 Medications


  (Trade)  Dose


 Ordered  Sig/Arlen


 Route


 PRN Reason  Start Time


 Stop Time Status Last Admin


Dose Admin


 


 Acetaminophen


  (Tylenol)  650 mg  Q4H  PRN


 ORAL


 T>100.5  8/9/19 11:00


 9/8/19 10:59   


 


 


 Albuterol/


 Ipratropium


  (Albuterol/


 Ipratropium)  3 ml  Q4H  PRN


 HHN


 Shortness of Breath  8/9/19 11:00


 8/14/19 10:59  8/11/19 14:09


 


 


 Amlodipine


 Besylate


  (Norvasc)  5 mg  BID


 ORAL


   8/10/19 09:00


 9/9/19 08:59  8/11/19 09:00


 


 


 Cephalexin


  (Keflex)  500 mg  EVERY 8  HOURS


 ORAL


   8/9/19 22:00


 8/16/19 21:59  8/11/19 13:47


 


 


 Clonidine HCl


  (Catapres Tab)  0.1 mg  Q4H  PRN


 ORAL


 SBP>160  8/10/19 06:59


 9/9/19 06:58   


 


 


 Dextrose


  (Dextrose 50%)  25 ml  Q30M  PRN


 IV


 Hypoglycemia  8/9/19 11:00


 9/8/19 10:59   


 


 


 Dextrose


  (Dextrose 50%)  50 ml  Q30M  PRN


 IV


 Hypoglycemia  8/9/19 11:00


 9/8/19 10:59   


 


 


 Folic Acid


  (Folate)  2 mg  DAILY


 ORAL


   8/10/19 09:15


 9/9/19 09:14  8/11/19 09:01


 


 


 Heparin Sodium


  (Porcine)


  (Heparin 5000


 units/ml)  5,000 units  EVERY 12  HOURS


 SUBQ


   8/9/19 11:00


 9/8/19 10:59  8/11/19 09:04


 


 


 Hydralazine HCl


  (Apresoline)  50 mg  Q8HR


 ORAL


   8/10/19 14:00


 9/8/19 17:59  8/11/19 13:47


 


 


 Metoprolol


 Tartrate


  (Lopressor)  12.5 mg  Q12HR


 ORAL


   8/10/19 09:15


 9/9/19 09:14  8/11/19 09:00


 


 


 Morphine Sulfate


  (Morphine


 Sulfate)  2 mg  Q4H  PRN


 IVP


 Severe Pain (Pain Scale 7-10)  8/9/19 11:00


 8/16/19 10:59   


 


 


 Ondansetron HCl


  (Zofran)  4 mg  Q6H  PRN


 IVP


 Nausea & Vomiting  8/9/19 11:00


 9/8/19 10:59   


 


 


 Polyethylene


 Glycol


  (Miralax)  17 gm  DAILYPRN  PRN


 ORAL


 Constipation  8/9/19 11:00


 9/8/19 10:59   


 








Allergies:  


Coded Allergies:  


     No Known Allergies (Unverified , 8/9/19)


ROS Limited/Unobtainable:  No


Constitutional:  Reports: no symptoms


HEENT:  Reports: no symptoms


Cardiovascular:  Reports: no symptoms


Respiratory:  Reports: no symptoms


Gastrointestinal/Abdominal:  Reports: no symptoms


Genitourinary:  Reports: no symptoms


Neurologic/Psychiatric:  Reports: no symptoms


Subjective


83 YO M admitted after mechanical fall.  Now fractured finger.  Cover for Int 

Layo-Dr Vuong





Objective





Last Vital Signs








  Date Time  Temp Pulse Resp B/P (MAP) Pulse Ox O2 Delivery O2 Flow Rate FiO2


 


8/11/19 14:20  81 16  100 Room Air  21


 


8/11/19 13:47    135/74    


 


8/11/19 12:00 97.0       











Laboratory Tests








Test


  8/11/19


05:50


 


White Blood Count


  8.2 K/UL


(4.8-10.8)


 


Red Blood Count


  3.79 M/UL


(4.70-6.10)  L


 


Hemoglobin


  10.2 G/DL


(14.2-18.0)  L


 


Hematocrit


  32.8 %


(42.0-52.0)  L


 


Mean Corpuscular Volume 86 FL (80-99)  


 


Mean Corpuscular Hemoglobin


  26.8 PG


(27.0-31.0)  L


 


Mean Corpuscular Hemoglobin


Concent 31.0 G/DL


(32.0-36.0)  L


 


Red Cell Distribution Width


  13.5 %


(11.6-14.8)


 


Platelet Count


  256 K/UL


(150-450)


 


Mean Platelet Volume


  5.5 FL


(6.5-10.1)  L


 


Neutrophils (%) (Auto)


  70.1 %


(45.0-75.0)


 


Lymphocytes (%) (Auto)


  15.6 %


(20.0-45.0)  L


 


Monocytes (%) (Auto)


  8.5 %


(1.0-10.0)


 


Eosinophils (%) (Auto)


  5.2 %


(0.0-3.0)  H


 


Basophils (%) (Auto)


  0.7 %


(0.0-2.0)


 


Sodium Level


  141 MMOL/L


(136-145)


 


Potassium Level


  3.9 MMOL/L


(3.5-5.1)


 


Chloride Level


  109 MMOL/L


()  H


 


Carbon Dioxide Level


  24 MMOL/L


(21-32)


 


Anion Gap


  8 mmol/L


(5-15)


 


Blood Urea Nitrogen


  20 mg/dL


(7-18)  H


 


Creatinine


  1.5 MG/DL


(0.55-1.30)  H


 


Estimat Glomerular Filtration


Rate  mL/min (>60)  


 


 


Glucose Level


  82 MG/DL


()


 


Calcium Level


  8.9 MG/DL


(8.5-10.1)











Microbiology








 Date/Time


Source Procedure


Growth Status


 


 


 8/9/19 08:50


Nasal Nares Left MRSA Culture - Final


NO METHICILLIN RESISTANT STAPH AUREUS... Complete


 


 8/9/19 08:50


Rectum VRE Culture - Final


NO VANCOMYCIN RESISTANT ENTEROCOCCUS ... Complete


 


 8/9/19 08:50


Rectum - Final


NO CARBAPENEM-RESISTANT ENTEROBACTERI... Complete

















Intake and Output  


 


 8/10/19 8/11/19





 18:59 06:59


 


Intake Total 360 ml 


 


Output Total 600 ml 


 


Balance -240 ml 


 


  


 


Intake Oral 360 ml 


 


Output Urine Total 600 ml 


 


# Voids 2 2








Objective


PHYSICAL EXAMINATION:


GENERAL:  The patient is awake and responsive, in no acute


distress.


HEAD AND NECK:  Pupils are reactive to light.  Extraocular movements


intact.


NECK:  Supple.  No JVD.


LUNGS:  Good air entry.  No wheezing or rales.


HEART:  Reveals S1, S2.  Distant heart sounds.  No gallops.


ABDOMEN:  Soft, nondistended, and nontender.  Positive bowel


sounds.


EXTREMITIES:  No cyanosis, clubbing, or edema.  Right hand fourth finger


with laceration and dislocation, finger has been reduced.


RECTAL/GENITOURINARY:  Refused and deferred.


PSYCHIATRIC:  Mood and affect is intact.





Assessment/Plan


Assessment/Plan


ASSESSMENT:


1. Status post mechanical fall with the right hand fourth finger


laceration as well as dislocation status post reduction.


2. Hypertension, uncontrolled.


3. History of CVA with left hemiparesis.


4. Chronic kidney disease.


5. Anemia.





PLAN:  


1.  Admit the patient to monitor unit.


2.   Dr. Tristan pulmonary consultation as well as 


3.  Dr. Davila from Infectious Disease.


4.  Follow up with 2D echo.  


5.  Code status is Full Code.  


6.  DVT prophylaxis with heparin subcutaneous.  We will follow up with the 


7.  anemia workup in progress.  We will hold aspirin at this time due to the 

patient's anemia.  Follow up


with the PT and OT evaluation. 


8.  discharge planning:  skilled nursing facility











Piero Harman MD Aug 11, 2019 15:11

## 2019-08-11 NOTE — NEPHROLOGY PROGRESS NOTE
Assessment/Plan


Problem List:  


(1) Renal failure (ARF), acute on chronic


Assessment:  improved





(2) History of hypertension


(3) Anemia


(4) History of CVA (cerebrovascular accident)


Assessment





Renal Failure-


? acute obn top of chronic


Anemia


HTN


h/o CVA


Rt 4th finger laceration: reason for admission


Plan








Anemia farley bit of low folate


U Na


U Analysis


BP control, further adjustment done


Avoid Nephrotoxics


? DC planning?





Subjective


ROS Limited/Unobtainable:  No





Objective


Objective





Last 24 Hour Vital Signs








  Date Time  Temp Pulse Resp B/P (MAP) Pulse Ox O2 Delivery O2 Flow Rate FiO2


 


8/11/19 09:00  68  177/76    


 


8/11/19 09:00  68  177/76    


 


8/11/19 08:00 96.1 68 18 177/76 (109) 98   


 


8/11/19 06:15    185/96    


 


8/11/19 04:00  71      


 


8/11/19 04:00 97.9 71 18 150/81 (104) 98   


 


8/11/19 00:00  75      


 


8/11/19 00:00 97.5 75 18 116/85 (95) 97   


 


8/10/19 22:33    180/71    


 


8/10/19 21:29  74  171/81    


 


8/10/19 21:00      Room Air  


 


8/10/19 20:00  84      


 


8/10/19 20:00 98.8 84 18 150/60 (90) 97   


 


8/10/19 18:09  75  157/62    


 


8/10/19 16:00  75      


 


8/10/19 16:00 98.9 75 20 157/62 (93) 98   


 


8/10/19 14:24    156/65    


 


8/10/19 12:00  66      


 


8/10/19 12:00 98.3 65 18 156/65 (95) 96   

















Intake and Output  


 


 8/10/19 8/11/19





 19:00 07:00


 


Intake Total 360 ml 120 ml


 


Output Total 600 ml 


 


Balance -240 ml 120 ml


 


  


 


Intake Oral 360 ml 120 ml


 


Output Urine Total 600 ml 


 


# Voids 2 2








Laboratory Tests


8/11/19 05:50: 


White Blood Count 8.2, Red Blood Count 3.79L, Hemoglobin 10.2L, Hematocrit 32.8L

, Mean Corpuscular Volume 86, Mean Corpuscular Hemoglobin 26.8L, Mean 

Corpuscular Hemoglobin Concent 31.0L, Red Cell Distribution Width 13.5, 

Platelet Count 256, Mean Platelet Volume 5.5L, Neutrophils (%) (Auto) 70.1, 

Lymphocytes (%) (Auto) 15.6L, Monocytes (%) (Auto) 8.5, Eosinophils (%) (Auto) 

5.2H, Basophils (%) (Auto) 0.7, Sodium Level 141, Potassium Level 3.9, Chloride 

Level 109H, Carbon Dioxide Level 24, Anion Gap 8, Blood Urea Nitrogen 20H, 

Creatinine 1.5H, Estimat Glomerular Filtration Rate , Glucose Level 82, Calcium 

Level 8.9


Height (Feet):  5


Height (Inches):  10.00


Weight (Pounds):  150


General Appearance:  no apparent distress


Objective


no change











Humberto De Jesus MD Aug 11, 2019 10:49

## 2019-08-11 NOTE — NUR
HAND-OFF: 

Report given to LILIANA Calderón. Pt is resting in bed in no acute distress. Bed locked in lowest 
position, call light within reach. Endorsed plan of care.

## 2019-08-11 NOTE — NUR
PT Note

PT pierre completed, treatment initiated. Patient has muscle weakness, L>R with decreased 
postural stability. Patient needs PT services to increase his muscle strength and balance to 
improve his safety in mobility and gait to minimize his risk for falls.

-------------------------------------------------------------------------------

Addendum: 08/11/19 at 1534 by MARIELA AMARO PT

-------------------------------------------------------------------------------

Amended: Links added.

## 2019-08-11 NOTE — NUR
NURSE NOTES:

Report received from LILIANA Rivers. Patient AOx4. IN RA. Denies any pain or SOB. IV patent and 
SL. Bed on lowest position, side rails upx2, brakes engaged. Call light within easy reach.

## 2019-08-11 NOTE — DIAGNOSTIC IMAGING REPORT
CT CHEST WITHOUT CONTRAST AND CT NECK WITHOUT CONTRAST

 

INDICATION: Concern for mass

 

TECHNIQUE: Continuous helical transaxial imaging of the chest was obtained. Coronal

2-D reformats were also obtained. Study obtained in a Siemens sensation 64 slice CT.

Automatic Exposure Control was utilized.

 

Total Dose length Product (DLP):  1170.38 mGycm

 

CT Dose Index Volume (CTDIvol):   16.96,21.75 mGy

 

COMPARISON: None

 

FINDINGS:

 

CT NECK:

 

Airway is patent. No mass identified. There are multilevel discogenic degenerative

changes of the visualized spine characterized by disc space narrowing, endplate

osteophyte formation, subchondral sclerosis, and uncovertebral hypertrophy leading to

mild to moderate bilateral foraminal narrowing, worse at the levels of C3-C4 and

C4-C5.. No prevertebral soft tissue swelling. 

 

CT CHEST:

 

Lungs and pleura: Moderate emphysema. There is mild interstitial edema. Left greater

than right small pleural effusions.

 

Scattered tiny pulmonary nodules less than 2 mm.

 

There is a solid 3 mm left lower lobe nodule (8:42). 

There is a 5 mm and 4 mm solid subpleural nodule cluster in the left lower lobe

(18:39).

 

Heart and mediastinum: Mild cardiomegaly. Mild coronary atherosclerotic

calcifications. Aortic valve is calcified. Trace pericardial fluid.

Airway: Patent without debris. Mild diffuse peribronchial thickening.

Lymph nodes: No enlarged lymph nodes.

Vasculature: Ascending aorta is normal in caliber. Main pulmonary artery is normal in

caliber. There is moderate aortic atherosclerotic calcifications.

Upper abdomen: Right renal cyst. Colonic diverticulosis.

 

Bones and soft tissue:There are multilevel discogenic degenerative changes of the

visualized spine. 

 

 

IMPRESSION:

 

1. No evidence of cervical soft tissue mass.

2. Mild interstitial edema with small bilateral pleural effusions.

3. Moderate emphysema with scattered tiny pulmonary nodules, likely

infectious/inflammatory given evidence of small airways disease. Follow-up CT in one

year is recommended for surveillance of larger nodules.

 

These findings are concordant with the [facility] preliminary report.

 

 

 

The CT scanner at [facility] is accredited by the [employer] College of

[employer] and the scans are performed using protocols designed to limit radiation

exposure to as low as reasonably achievable to attain images of sufficient resolution

adequate for diagnostic evaluation

## 2019-08-11 NOTE — NUR
NURSE NOTES:

Received pt from LILIANA Calderón. Pt is awake and resting in bed. Will continue with plan of care.

## 2019-08-11 NOTE — CARDIOLOGY PROGRESS NOTE
Assessment/Plan


Assessment/Plan


1. Non-syncopal fall.


2. Right dorsal dislocation of PIP joint of the index finger.


3. History of CVA with carotid endarterectomy.


4. History of hypertension.











seems to be doign ok 


cr imporved 


ct of chest and neck noted 


bp seem ok on combo of norvasc anxc hydaraalzine 


to start on acei or arbs when ok with dr cuadra





Subjective


Cardiovascular:  Denies: chest pain, lightheadedness, palpitations


Respiratory:  Denies: shortness of breath


Gastrointestinal/Abdominal:  Denies: abdominal pain


Genitourinary:  Reports: frequency





Objective





Last 24 Hour Vital Signs








  Date Time  Temp Pulse Resp B/P (MAP) Pulse Ox O2 Delivery O2 Flow Rate FiO2


 


8/11/19 16:00  85      


 


8/11/19 14:20  81 16  100 Room Air  21


 


8/11/19 14:11  79 19  97 Room Air  21


 


8/11/19 14:09  79 18  97 Room Air  21


 


8/11/19 13:47    135/74    


 


8/11/19 12:00  65      


 


8/11/19 12:00 97.0 67 18 135/74 (94) 96   


 


8/11/19 09:40  65  145/71 (95)    


 


8/11/19 09:00      Room Air  


 


8/11/19 09:00  68  177/76    


 


8/11/19 09:00  68  177/76    


 


8/11/19 08:00 96.1 68 18 177/76 (109) 98   


 


8/11/19 08:00  84      


 


8/11/19 06:15    185/96    


 


8/11/19 04:00  71      


 


8/11/19 04:00 97.9 71 18 150/81 (104) 98   


 


8/11/19 00:00  75      


 


8/11/19 00:00 97.5 75 18 116/85 (95) 97   


 


8/10/19 22:33    180/71    


 


8/10/19 21:29  74  171/81    


 


8/10/19 21:00      Room Air  


 


8/10/19 20:00  84      


 


8/10/19 20:00 98.8 84 18 150/60 (90) 97   


 


8/10/19 18:09  75  157/62    








Respiratory/Chest:  normal breath sounds


Abdomen:  soft


Extremities:  no swelling











Intake and Output  


 


 8/10/19 8/11/19





 18:59 06:59


 


Intake Total 360 ml 


 


Output Total 600 ml 


 


Balance -240 ml 


 


  


 


Intake Oral 360 ml 


 


Output Urine Total 600 ml 


 


# Voids 2 2











Laboratory Tests








Test


  8/11/19


05:50


 


White Blood Count


  8.2 K/UL


(4.8-10.8)


 


Red Blood Count


  3.79 M/UL


(4.70-6.10)  L


 


Hemoglobin


  10.2 G/DL


(14.2-18.0)  L


 


Hematocrit


  32.8 %


(42.0-52.0)  L


 


Mean Corpuscular Volume 86 FL (80-99)  


 


Mean Corpuscular Hemoglobin


  26.8 PG


(27.0-31.0)  L


 


Mean Corpuscular Hemoglobin


Concent 31.0 G/DL


(32.0-36.0)  L


 


Red Cell Distribution Width


  13.5 %


(11.6-14.8)


 


Platelet Count


  256 K/UL


(150-450)


 


Mean Platelet Volume


  5.5 FL


(6.5-10.1)  L


 


Neutrophils (%) (Auto)


  70.1 %


(45.0-75.0)


 


Lymphocytes (%) (Auto)


  15.6 %


(20.0-45.0)  L


 


Monocytes (%) (Auto)


  8.5 %


(1.0-10.0)


 


Eosinophils (%) (Auto)


  5.2 %


(0.0-3.0)  H


 


Basophils (%) (Auto)


  0.7 %


(0.0-2.0)


 


Sodium Level


  141 MMOL/L


(136-145)


 


Potassium Level


  3.9 MMOL/L


(3.5-5.1)


 


Chloride Level


  109 MMOL/L


()  H


 


Carbon Dioxide Level


  24 MMOL/L


(21-32)


 


Anion Gap


  8 mmol/L


(5-15)


 


Blood Urea Nitrogen


  20 mg/dL


(7-18)  H


 


Creatinine


  1.5 MG/DL


(0.55-1.30)  H


 


Estimat Glomerular Filtration


Rate  mL/min (>60)  


 


 


Glucose Level


  82 MG/DL


()


 


Calcium Level


  8.9 MG/DL


(8.5-10.1)











Microbiology








 Date/Time


Source Procedure


Growth Status


 


 


 8/9/19 08:50


Nasal Nares Left MRSA Culture - Final


NO METHICILLIN RESISTANT STAPH AUREUS... Complete


 


 8/9/19 08:50


Rectum VRE Culture - Final


NO VANCOMYCIN RESISTANT ENTEROCOCCUS ... Complete


 


 8/9/19 08:50


Rectum - Final


NO CARBAPENEM-RESISTANT ENTEROBACTERI... Complete

















Wali Oviedo MD Aug 11, 2019 17:20

## 2019-08-11 NOTE — PULMONOLOGY PROGRESS NOTE
Assessment/Plan


Problems:  


(1) Pneumonia


(2) History of hypertension


(3) History of CVA (cerebrovascular accident)


Assessment/Plan


doing better


respiratory treatment


check electrolytes


dvt prophylaxis


no new complains





Subjective


ROS Limited/Unobtainable:  No


HEENT:  Repors: no symptoms


Respiratory:  Reports: no symptoms


Cardiovascular:  Reports: no symptoms


Allergies:  


Coded Allergies:  


     No Known Allergies (Unverified , 8/9/19)





Objective





Last 24 Hour Vital Signs








  Date Time  Temp Pulse Resp B/P (MAP) Pulse Ox O2 Delivery O2 Flow Rate FiO2


 


8/11/19 18:50  79  112/67    


 


8/11/19 16:00 98.7 79 18 112/69 (83) 97   


 


8/11/19 16:00  85      


 


8/11/19 14:20  81 16  100 Room Air  21


 


8/11/19 14:11  79 19  97 Room Air  21


 


8/11/19 14:09  79 18  97 Room Air  21


 


8/11/19 13:47    135/74    


 


8/11/19 12:00  65      


 


8/11/19 12:00 97.0 67 18 135/74 (94) 96   


 


8/11/19 09:40  65  145/71 (95)    


 


8/11/19 09:00      Room Air  


 


8/11/19 09:00  68  177/76    


 


8/11/19 09:00  68  177/76    


 


8/11/19 08:00 96.1 68 18 177/76 (109) 98   


 


8/11/19 08:00  84      


 


8/11/19 06:15    185/96    


 


8/11/19 04:00  71      


 


8/11/19 04:00 97.9 71 18 150/81 (104) 98   


 


8/11/19 00:00  75      


 


8/11/19 00:00 97.5 75 18 116/85 (95) 97   


 


8/10/19 22:33    180/71    


 


8/10/19 21:29  74  171/81    


 


8/10/19 21:00      Room Air  


 


8/10/19 20:00  84      


 


8/10/19 20:00 98.8 84 18 150/60 (90) 97   

















Intake and Output  


 


 8/10/19 8/11/19





 18:59 06:59


 


Intake Total 360 ml 


 


Output Total 600 ml 


 


Balance -240 ml 


 


  


 


Intake Oral 360 ml 


 


Output Urine Total 600 ml 


 


# Voids 2 2








General Appearance:  WD/WN


HEENT:  normocephalic, anicteric


Respiratory/Chest:  chest wall non-tender, lungs clear


Cardiovascular:  normal peripheral pulses, normal rate


Abdomen:  normal bowel sounds, soft, non tender


Genitourinary:  normal external genitalia


Extremities:  no clubbing


Skin:  no rash


Neurologic/Psychiatric:  CNs II-XII grossly normal





Microbiology








 Date/Time


Source Procedure


Growth Status


 


 


 8/9/19 08:50


Nasal Nares Left MRSA Culture - Final


NO METHICILLIN RESISTANT STAPH AUREUS... Complete


 


 8/9/19 08:50


Rectum VRE Culture - Final


NO VANCOMYCIN RESISTANT ENTEROCOCCUS ... Complete


 


 8/9/19 08:50


Rectum - Final


NO CARBAPENEM-RESISTANT ENTEROBACTERI... Complete








Laboratory Tests


8/11/19 05:50: 


White Blood Count 8.2, Red Blood Count 3.79L, Hemoglobin 10.2L, Hematocrit 32.8L

, Mean Corpuscular Volume 86, Mean Corpuscular Hemoglobin 26.8L, Mean 

Corpuscular Hemoglobin Concent 31.0L, Red Cell Distribution Width 13.5, 

Platelet Count 256, Mean Platelet Volume 5.5L, Neutrophils (%) (Auto) 70.1, 

Lymphocytes (%) (Auto) 15.6L, Monocytes (%) (Auto) 8.5, Eosinophils (%) (Auto) 

5.2H, Basophils (%) (Auto) 0.7, Sodium Level 141, Potassium Level 3.9, Chloride 

Level 109H, Carbon Dioxide Level 24, Anion Gap 8, Blood Urea Nitrogen 20H, 

Creatinine 1.5H, Estimat Glomerular Filtration Rate , Glucose Level 82, Calcium 

Level 8.9





Current Medications








 Medications


  (Trade)  Dose


 Ordered  Sig/Arlen


 Route


 PRN Reason  Start Time


 Stop Time Status Last Admin


Dose Admin


 


 Acetaminophen


  (Tylenol)  650 mg  Q4H  PRN


 ORAL


 T>100.5  8/9/19 11:00


 9/8/19 10:59   


 


 


 Albuterol/


 Ipratropium


  (Albuterol/


 Ipratropium)  3 ml  Q4H  PRN


 HHN


 Shortness of Breath  8/9/19 11:00


 8/14/19 10:59  8/11/19 14:09


 


 


 Amlodipine


 Besylate


  (Norvasc)  5 mg  BID


 ORAL


   8/10/19 09:00


 9/9/19 08:59  8/11/19 18:50


 


 


 Cephalexin


  (Keflex)  500 mg  EVERY 8  HOURS


 ORAL


   8/9/19 22:00


 8/16/19 21:59  8/11/19 13:47


 


 


 Clonidine HCl


  (Catapres Tab)  0.1 mg  Q4H  PRN


 ORAL


 SBP>160  8/10/19 06:59


 9/9/19 06:58   


 


 


 Dextrose


  (Dextrose 50%)  25 ml  Q30M  PRN


 IV


 Hypoglycemia  8/9/19 11:00


 9/8/19 10:59   


 


 


 Dextrose


  (Dextrose 50%)  50 ml  Q30M  PRN


 IV


 Hypoglycemia  8/9/19 11:00


 9/8/19 10:59   


 


 


 Folic Acid


  (Folate)  2 mg  DAILY


 ORAL


   8/10/19 09:15


 9/9/19 09:14  8/11/19 09:01


 


 


 Heparin Sodium


  (Porcine)


  (Heparin 5000


 units/ml)  5,000 units  EVERY 12  HOURS


 SUBQ


   8/9/19 11:00


 9/8/19 10:59  8/11/19 09:04


 


 


 Hydralazine HCl


  (Apresoline)  50 mg  Q8HR


 ORAL


   8/10/19 14:00


 9/8/19 17:59  8/11/19 13:47


 


 


 Metoprolol


 Tartrate


  (Lopressor)  12.5 mg  Q12HR


 ORAL


   8/10/19 09:15


 9/9/19 09:14  8/11/19 09:00


 


 


 Morphine Sulfate


  (Morphine


 Sulfate)  2 mg  Q4H  PRN


 IVP


 Severe Pain (Pain Scale 7-10)  8/9/19 11:00


 8/16/19 10:59   


 


 


 Ondansetron HCl


  (Zofran)  4 mg  Q6H  PRN


 IVP


 Nausea & Vomiting  8/9/19 11:00


 9/8/19 10:59   


 


 


 Polyethylene


 Glycol


  (Miralax)  17 gm  DAILYPRN  PRN


 ORAL


 Constipation  8/9/19 11:00


 9/8/19 10:59   


 

















Kolton Tristan MD Aug 11, 2019 19:25

## 2019-08-11 NOTE — DIAGNOSTIC IMAGING REPORT
CT CHEST WITHOUT CONTRAST AND CT NECK WITHOUT CONTRAST

 

INDICATION: Concern for mass

 

TECHNIQUE: Continuous helical transaxial imaging of the chest was obtained. Coronal

2-D reformats were also obtained. Study obtained in a Siemens sensation 64 slice CT.

Automatic Exposure Control was utilized.

 

Total Dose length Product (DLP):  1170.38 mGycm

 

CT Dose Index Volume (CTDIvol):   16.96,21.75 mGy

 

COMPARISON: None

 

FINDINGS:

 

CT NECK:

 

Airway is patent. No mass identified. There are multilevel discogenic degenerative

changes of the visualized spine characterized by disc space narrowing, endplate

osteophyte formation, subchondral sclerosis, and uncovertebral hypertrophy leading to

mild to moderate bilateral foraminal narrowing, worse at the levels of C3-C4 and

C4-C5.. No prevertebral soft tissue swelling. 

 

CT CHEST:

 

Lungs and pleura: Moderate emphysema. There is mild interstitial edema. Left greater

than right small pleural effusions.

 

Scattered tiny pulmonary nodules less than 2 mm.

 

There is a solid 3 mm left lower lobe nodule (8:42). 

There is a 5 mm and 4 mm solid subpleural nodule cluster in the left lower lobe

(18:39).

 

Heart and mediastinum: Mild cardiomegaly. Mild coronary atherosclerotic

calcifications. Aortic valve is calcified. Trace pericardial fluid.

Airway: Patent without debris. Mild diffuse peribronchial thickening.

Lymph nodes: No enlarged lymph nodes.

Vasculature: Ascending aorta is normal in caliber. Main pulmonary artery is normal in

caliber. There is moderate aortic atherosclerotic calcifications.

Upper abdomen: Right renal cyst. Colonic diverticulosis.

 

Bones and soft tissue:There are multilevel discogenic degenerative changes of the

visualized spine. 

 

 

IMPRESSION:

 

1. No evidence of cervical soft tissue mass.

2. Mild interstitial edema with small bilateral pleural effusions.

3. Moderate emphysema with scattered tiny pulmonary nodules, likely

infectious/inflammatory given evidence of small airways disease. Follow-up CT in one

year is recommended for surveillance of larger nodules.

 

These findings are concordant with the [facility] preliminary report.

 

 

 

The CT scanner at [facility] is accredited by the [employer]

Radiology and the scans are performed using protocols designed to limit radiation

exposure to as low as reasonably achievable to attain images of sufficient resolution

adequate for diagnostic evaluation

## 2019-08-12 NOTE — NUR
83 Y/O Male BIBA from Street



CC: Injured Right Ring finger



SI: Fall, Pneumonia, Finger Dislocation

     VS: BP: 203/186 HR: 100 RR 14 02 Sat 98% (RA) T: 98.1

     NT: RBC 3.45 Hgb 9.2 Hct 29.8 Chloride 108 BUN 20 Creatinine 1.9 Albumin 3.2

     XRAY Hand Complete R: Dislocation at the fourth proximal interphalangeal joint. No 
definite/displaced associated acute fracture. 

                  Osteoarthrosis.

     CT Head: Atrophy and nonspecific periventricular hypoattenuation suggestive of chronic 
ischemic microvascular changes. Likely chronic   

                  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

     CXR: Atherosclerotic disease. Bilateral hilar prominence most likely related to ectatic 
pulmonary vasculature. Recommend routine follow-up 

           chest CT to exclude the possibility of mass or lymphadenopathy.

     XRAY Hand Ltd 2v R: Successful reduction of the previously seen dislocation at the 
fourth proximal interphalangeal joint







IS: TDAP IM

     Bupivacaine 0.5% INJ

     Albuterol/Ipratropium 3ml neb

     ceFAZolin 1gm IV







*****Admitted to Telemetry*****

*****Telemetry status*****



DCP: Pending Hospital Stay

## 2019-08-12 NOTE — INTERNAL MED PROGRESS NOTE
Subjective


Date of Service:  Aug 12, 2019


Physician Name


Piero Harman


Attending Physician


Frandy Vuong MD





Current Medications








 Medications


  (Trade)  Dose


 Ordered  Sig/Arlen


 Route


 PRN Reason  Start Time


 Stop Time Status Last Admin


Dose Admin


 


 Acetaminophen


  (Tylenol)  650 mg  Q4H  PRN


 ORAL


 T>100.5  8/9/19 11:00


 9/8/19 10:59   


 


 


 Albuterol/


 Ipratropium


  (Albuterol/


 Ipratropium)  3 ml  Q4H  PRN


 HHN


 Shortness of Breath  8/9/19 11:00


 8/14/19 10:59  8/11/19 14:09


 


 


 Amlodipine


 Besylate


  (Norvasc)  5 mg  BID


 ORAL


   8/10/19 09:00


 9/9/19 08:59  8/12/19 08:47


 


 


 Cephalexin


  (Keflex)  500 mg  EVERY 8  HOURS


 ORAL


   8/9/19 22:00


 8/16/19 21:59  8/12/19 13:07


 


 


 Clonidine HCl


  (Catapres Tab)  0.1 mg  Q4H  PRN


 ORAL


 SBP>160  8/10/19 06:59


 9/9/19 06:58  8/11/19 23:44


 


 


 Dextrose


  (Dextrose 50%)  25 ml  Q30M  PRN


 IV


 Hypoglycemia  8/9/19 11:00


 9/8/19 10:59   


 


 


 Dextrose


  (Dextrose 50%)  50 ml  Q30M  PRN


 IV


 Hypoglycemia  8/9/19 11:00


 9/8/19 10:59   


 


 


 Folic Acid


  (Folate)  2 mg  DAILY


 ORAL


   8/10/19 09:15


 9/9/19 09:14  8/12/19 08:47


 


 


 Heparin Sodium


  (Porcine)


  (Heparin 5000


 units/ml)  5,000 units  EVERY 12  HOURS


 SUBQ


   8/9/19 11:00


 9/8/19 10:59  8/12/19 08:49


 


 


 Hydralazine HCl


  (Apresoline)  50 mg  Q8HR


 ORAL


   8/10/19 14:00


 9/8/19 17:59  8/12/19 13:07


 


 


 Metoprolol


 Tartrate


  (Lopressor)  12.5 mg  Q12HR


 ORAL


   8/10/19 09:15


 9/9/19 09:14  8/12/19 08:47


 


 


 Morphine Sulfate


  (Morphine


 Sulfate)  2 mg  Q4H  PRN


 IVP


 Severe Pain (Pain Scale 7-10)  8/9/19 11:00


 8/16/19 10:59   


 


 


 Ondansetron HCl


  (Zofran)  4 mg  Q6H  PRN


 IVP


 Nausea & Vomiting  8/9/19 11:00


 9/8/19 10:59   


 


 


 Polyethylene


 Glycol


  (Miralax)  17 gm  DAILYPRN  PRN


 ORAL


 Constipation  8/9/19 11:00


 9/8/19 10:59   


 








Allergies:  


Coded Allergies:  


     No Known Allergies (Unverified , 8/9/19)


ROS Limited/Unobtainable:  No


Constitutional:  Reports: no symptoms


HEENT:  Reports: no symptoms


Cardiovascular:  Reports: no symptoms


Respiratory:  Reports: no symptoms


Gastrointestinal/Abdominal:  Reports: no symptoms


Genitourinary:  Reports: no symptoms


Neurologic/Psychiatric:  Reports: no symptoms


Subjective


85 YO M admitted after mechanical fall.  Now fractured finger.  Cover for Int 

Med-Dr Vuong





Objective





Last Vital Signs








  Date Time  Temp Pulse Resp B/P (MAP) Pulse Ox O2 Delivery O2 Flow Rate FiO2


 


8/12/19 13:07    148/56    


 


8/12/19 12:00  58      


 


8/12/19 12:00 96.8  20  98   


 


8/12/19 09:00      Room Air  





      Room Air  


 


8/12/19 06:50        21











Laboratory Tests








Test


  8/12/19


06:53


 


White Blood Count


  7.0 K/UL


(4.8-10.8)


 


Red Blood Count


  4.00 M/UL


(4.70-6.10)  L


 


Hemoglobin


  10.8 G/DL


(14.2-18.0)  L


 


Hematocrit


  34.5 %


(42.0-52.0)  L


 


Mean Corpuscular Volume 86 FL (80-99)  


 


Mean Corpuscular Hemoglobin


  26.9 PG


(27.0-31.0)  L


 


Mean Corpuscular Hemoglobin


Concent 31.2 G/DL


(32.0-36.0)  L


 


Red Cell Distribution Width


  13.6 %


(11.6-14.8)


 


Platelet Count


  263 K/UL


(150-450)


 


Mean Platelet Volume


  5.8 FL


(6.5-10.1)  L


 


Neutrophils (%) (Auto)


  70.0 %


(45.0-75.0)


 


Lymphocytes (%) (Auto)


  13.6 %


(20.0-45.0)  L


 


Monocytes (%) (Auto)


  9.8 %


(1.0-10.0)


 


Eosinophils (%) (Auto)


  5.5 %


(0.0-3.0)  H


 


Basophils (%) (Auto)


  1.1 %


(0.0-2.0)


 


Sodium Level


  141 MMOL/L


(136-145)


 


Potassium Level


  4.0 MMOL/L


(3.5-5.1)


 


Chloride Level


  107 MMOL/L


()


 


Carbon Dioxide Level


  25 MMOL/L


(21-32)


 


Anion Gap


  9 mmol/L


(5-15)


 


Blood Urea Nitrogen


  18 mg/dL


(7-18)


 


Creatinine


  1.4 MG/DL


(0.55-1.30)  H


 


Estimat Glomerular Filtration


Rate  mL/min (>60)  


 


 


Glucose Level


  90 MG/DL


()


 


Calcium Level


  8.6 MG/DL


(8.5-10.1)

















Intake and Output  


 


 8/11/19 8/12/19





 19:00 07:00


 


Intake Total 240 ml 720 ml


 


Balance 240 ml 720 ml


 


  


 


Intake Oral 240 ml 720 ml


 


# Voids  5








Objective


PHYSICAL EXAMINATION:


GENERAL:  The patient is awake and responsive, in no acute


distress.


HEAD AND NECK:  Pupils are reactive to light.  Extraocular movements


intact.


NECK:  Supple.  No JVD.


LUNGS:  Good air entry.  No wheezing or rales.


HEART:  Reveals S1, S2.  Distant heart sounds.  No gallops.


ABDOMEN:  Soft, nondistended, and nontender.  Positive bowel


sounds.


EXTREMITIES:  No cyanosis, clubbing, or edema.  Right hand fourth finger


with laceration and dislocation, finger has been reduced.


RECTAL/GENITOURINARY:  Refused and deferred.


PSYCHIATRIC:  Mood and affect is intact.





Assessment/Plan


Assessment/Plan


ASSESSMENT:


1. Status post mechanical fall with the right hand fourth finger


laceration as well as dislocation status post reduction.


2. Hypertension, uncontrolled.


3. History of CVA with left hemiparesis.


4. Acute on Chronic kidney failure


5. Anemia.





PLAN:  


1.  Admit the patient to monitor unit.


2.   Dr. Tristan pulmonary consultation as well as 


3.  Dr. Davila from Infectious Disease.


4.  Follow up with 2D echo.  


5.  Code status is Full Code.  


6.  DVT prophylaxis with heparin subcutaneous.  We will follow up with the 


7.  anemia workup in progress.  We will hold aspirin at this time due to the 

patient's anemia.  Follow up


with the PT and OT evaluation. 


8.  discharge planning:  skilled nursing facility


9.  Renal=Piero Easton MD Aug 12, 2019 13:31

## 2019-08-12 NOTE — NUR
NURSE NOTES:

Pt received from [facility] alert and oriented x2, hard of hearing bilaterally. No acute s/s 
of distress noted. Pt currently sitting on chair at edge of bed watching TV. IV site on 22L 
ac saline lock. Bed in lowest position, call light and belongings within reach.

## 2019-08-12 NOTE — INFECTIOUS DISEASES PROG NOTE
Assessment/Plan


Assessment/Plan








Assessment:


Afebrile


No leukocytosis





R 4th finger pan-2ry to PIP dislocation and laceration, s/p reduction


  -repeat R hand xray: Successful reduction of the previously seen dislocation 

at the fourth proximal interphalangeal joint. No definite/displaced acute 

fracture.


  -xray R hand:  Dislocation at the fourth proximal interphalangeal joint. No 

definite/displaced associated acute fracture.Osteoarthrosis.


 


s/p Fall


  -Head CT: Exam limited by motion artifact. Within this limitations:No 

evidence of acute intracranial hemorrhage, mass effect or cortical edema. MRI 

maybe obtained for more sensitive evaluation as clinically indicated. Atrophy 

and nonspecific periventricular hypoattenuation suggestive of chronic ischemic 

microvascular changes. Likely chronic infarct in the right frontal lobe. Left 

supraorbital soft tissue swelling. No depressed skull fracture.


  -CT chest: No discrete mass.Pulmonary emphysema.Cardiomegaly.  Small 

pericardial fluid.Small left and trace right pleural effusions.  Right renal 

cyst. 


  -CT neck: No discrete mass. Pulmonary emphysema. Cervical spondylosis with 

central canal and foraminal stenosis.


 





Dyspnea


  -CXR:   Nonspecific mild interstitial prominence, possibly chronic. No 

definite focal consolidation, pleural effusion, pneumothorax or evidence of 

alveolar edema. Borderline cardiomegaly, possibly exaggerated by AP 

technique.Bilateral hilar prominence most likely related to ectatic pulmonary 

vasculature.








VIKASH, improving


   -u/a neg


   -Renal US:  Slightly increased echogenicity within the kidneys which can be 

seen in the setting of medical renal disease.  No hydronephrosis.  Cyst within 

the right renal upper pole.  No stones.  Unremarkable appearance of the 

bladder. 


 





CVA w L side weakness


HTN


 appendectomy





Plan:


-Continue prophylactic PO Keflex #4/5


    -8/9 IV Vancomycin, Ceftriaxone #1, Cefepime x1





-f/u cx


-Monitor CBC/CMP, temperatures


-wound care





Thank you for this consultation. Will continue to follow along with  you.





Discussed with RN.





Subjective


Allergies:  


Coded Allergies:  


     No Known Allergies (Unverified , 8/9/19)


Subjective


afebrile


no leukocytosis





Objective


Vital Signs





Last 24 Hour Vital Signs








  Date Time  Temp Pulse Resp B/P (MAP) Pulse Ox O2 Delivery O2 Flow Rate FiO2


 


8/12/19 09:00      Room Air  





      Room Air  


 


8/12/19 08:47  92  132/73    


 


8/12/19 08:47  92  132/73    


 


8/12/19 08:00  92      


 


8/12/19 08:00 96.7 92 20 132/73 (92) 98   


 


8/12/19 06:50  71 15  98 Room Air  21


 


8/12/19 06:04    153/86    


 


8/12/19 04:00 97.7 69 18 147/77 (100) 96   


 


8/12/19 04:00  69      


 


8/12/19 00:00 98.0 75 20 143/71 (95) 97   


 


8/12/19 00:00  75      


 


8/11/19 23:44    185/86    


 


8/11/19 22:33    182/80    


 


8/11/19 21:31  79  187/89    


 


8/11/19 21:17  81 18  98 Room Air  21


 


8/11/19 21:00      Room Air  





      Room Air  


 


8/11/19 20:00 97.8 66 20 180/84 (116) 96   


 


8/11/19 20:00  66      


 


8/11/19 18:50  79  112/67    


 


8/11/19 16:00 98.7 79 18 112/69 (83) 97   


 


8/11/19 16:00  85      


 


8/11/19 14:20  81 16  100 Room Air  21


 


8/11/19 14:11  79 19  97 Room Air  21


 


8/11/19 14:09  79 18  97 Room Air  21


 


8/11/19 13:47    135/74    


 


8/11/19 12:00  65      


 


8/11/19 12:00 97.0 67 18 135/74 (94) 96   








Height (Feet):  5


Height (Inches):  10.00


Weight (Pounds):  150


Objective





General Appearance:  thin


Lines, tubes and drains:  peripheral, PICC


HEENT:  normocephalic, atraumatic


Neck:  non-tender, supple


Respiratory/Chest:  chest wall non-tender, lungs clear


Cardiovascular/Chest:  normal peripheral pulses


Abdomen:  normal bowel sounds


Extremities:  , laceration - R 4th finger laceration





Laboratory Tests








Test


  8/12/19


06:53


 


White Blood Count


  7.0 K/UL


(4.8-10.8)


 


Red Blood Count


  4.00 M/UL


(4.70-6.10)  L


 


Hemoglobin


  10.8 G/DL


(14.2-18.0)  L


 


Hematocrit


  34.5 %


(42.0-52.0)  L


 


Mean Corpuscular Volume 86 FL (80-99)  


 


Mean Corpuscular Hemoglobin


  26.9 PG


(27.0-31.0)  L


 


Mean Corpuscular Hemoglobin


Concent 31.2 G/DL


(32.0-36.0)  L


 


Red Cell Distribution Width


  13.6 %


(11.6-14.8)


 


Platelet Count


  263 K/UL


(150-450)


 


Mean Platelet Volume


  5.8 FL


(6.5-10.1)  L


 


Neutrophils (%) (Auto)


  70.0 %


(45.0-75.0)


 


Lymphocytes (%) (Auto)


  13.6 %


(20.0-45.0)  L


 


Monocytes (%) (Auto)


  9.8 %


(1.0-10.0)


 


Eosinophils (%) (Auto)


  5.5 %


(0.0-3.0)  H


 


Basophils (%) (Auto)


  1.1 %


(0.0-2.0)


 


Sodium Level


  141 MMOL/L


(136-145)


 


Potassium Level


  4.0 MMOL/L


(3.5-5.1)


 


Chloride Level


  107 MMOL/L


()


 


Carbon Dioxide Level


  25 MMOL/L


(21-32)


 


Anion Gap


  9 mmol/L


(5-15)


 


Blood Urea Nitrogen


  18 mg/dL


(7-18)


 


Creatinine


  1.4 MG/DL


(0.55-1.30)  H


 


Estimat Glomerular Filtration


Rate  mL/min (>60)  


 


 


Glucose Level


  90 MG/DL


()


 


Calcium Level


  8.6 MG/DL


(8.5-10.1)











Current Medications








 Medications


  (Trade)  Dose


 Ordered  Sig/Arlen


 Route


 PRN Reason  Start Time


 Stop Time Status Last Admin


Dose Admin


 


 Acetaminophen


  (Tylenol)  650 mg  Q4H  PRN


 ORAL


 T>100.5  8/9/19 11:00


 9/8/19 10:59   


 


 


 Albuterol/


 Ipratropium


  (Albuterol/


 Ipratropium)  3 ml  Q4H  PRN


 HHN


 Shortness of Breath  8/9/19 11:00


 8/14/19 10:59  8/11/19 14:09


 


 


 Amlodipine


 Besylate


  (Norvasc)  5 mg  BID


 ORAL


   8/10/19 09:00


 9/9/19 08:59  8/12/19 08:47


 


 


 Cephalexin


  (Keflex)  500 mg  EVERY 8  HOURS


 ORAL


   8/9/19 22:00


 8/16/19 21:59  8/12/19 06:04


 


 


 Clonidine HCl


  (Catapres Tab)  0.1 mg  Q4H  PRN


 ORAL


 SBP>160  8/10/19 06:59


 9/9/19 06:58  8/11/19 23:44


 


 


 Dextrose


  (Dextrose 50%)  25 ml  Q30M  PRN


 IV


 Hypoglycemia  8/9/19 11:00


 9/8/19 10:59   


 


 


 Dextrose


  (Dextrose 50%)  50 ml  Q30M  PRN


 IV


 Hypoglycemia  8/9/19 11:00


 9/8/19 10:59   


 


 


 Folic Acid


  (Folate)  2 mg  DAILY


 ORAL


   8/10/19 09:15


 9/9/19 09:14  8/12/19 08:47


 


 


 Heparin Sodium


  (Porcine)


  (Heparin 5000


 units/ml)  5,000 units  EVERY 12  HOURS


 SUBQ


   8/9/19 11:00


 9/8/19 10:59  8/12/19 08:49


 


 


 Hydralazine HCl


  (Apresoline)  50 mg  Q8HR


 ORAL


   8/10/19 14:00


 9/8/19 17:59  8/12/19 06:04


 


 


 Metoprolol


 Tartrate


  (Lopressor)  12.5 mg  Q12HR


 ORAL


   8/10/19 09:15


 9/9/19 09:14  8/12/19 08:47


 


 


 Morphine Sulfate


  (Morphine


 Sulfate)  2 mg  Q4H  PRN


 IVP


 Severe Pain (Pain Scale 7-10)  8/9/19 11:00


 8/16/19 10:59   


 


 


 Ondansetron HCl


  (Zofran)  4 mg  Q6H  PRN


 IVP


 Nausea & Vomiting  8/9/19 11:00


 9/8/19 10:59   


 


 


 Polyethylene


 Glycol


  (Miralax)  17 gm  DAILYPRN  PRN


 ORAL


 Constipation  8/9/19 11:00


 9/8/19 10:59   


 

















Alexa Davila M.D. Aug 12, 2019 12:21

## 2019-08-12 NOTE — NEPHROLOGY PROGRESS NOTE
Assessment/Plan


Problem List:  


(1) Renal failure (ARF), acute on chronic


Assessment:  improved





(2) History of hypertension


(3) Anemia


(4) History of CVA (cerebrovascular accident)


Assessment





Renal Failure-


? acute obn top of chronic


Anemia


HTN


h/o CVA


Rt 4th finger laceration: reason for admission


Plan








Anemia farley bit of low folate


U Na


U Analysis


BP control, further adjustment done


Avoid Nephrotoxics


? DC planning?





Subjective


ROS Limited/Unobtainable:  No





Objective


Objective





Last 24 Hour Vital Signs








  Date Time  Temp Pulse Resp B/P (MAP) Pulse Ox O2 Delivery O2 Flow Rate FiO2


 


8/12/19 12:00 96.8 51 20 148/56 (86) 98   


 


8/12/19 09:00      Room Air  





      Room Air  


 


8/12/19 08:47  92  132/73    


 


8/12/19 08:47  92  132/73    


 


8/12/19 08:00  92      


 


8/12/19 08:00 96.7 92 20 132/73 (92) 98   


 


8/12/19 06:50  71 15  98 Room Air  21


 


8/12/19 06:04    153/86    


 


8/12/19 04:00 97.7 69 18 147/77 (100) 96   


 


8/12/19 04:00  69      


 


8/12/19 00:00 98.0 75 20 143/71 (95) 97   


 


8/12/19 00:00  75      


 


8/11/19 23:44    185/86    


 


8/11/19 22:33    182/80    


 


8/11/19 21:31  79  187/89    


 


8/11/19 21:17  81 18  98 Room Air  21


 


8/11/19 21:00      Room Air  





      Room Air  


 


8/11/19 20:00 97.8 66 20 180/84 (116) 96   


 


8/11/19 20:00  66      


 


8/11/19 18:50  79  112/67    


 


8/11/19 16:00 98.7 79 18 112/69 (83) 97   


 


8/11/19 16:00  85      


 


8/11/19 14:20  81 16  100 Room Air  21


 


8/11/19 14:11  79 19  97 Room Air  21


 


8/11/19 14:09  79 18  97 Room Air  21


 


8/11/19 13:47    135/74    

















Intake and Output  


 


 8/11/19 8/12/19





 19:00 07:00


 


Intake Total 240 ml 720 ml


 


Balance 240 ml 720 ml


 


  


 


Intake Oral 240 ml 720 ml


 


# Voids  5








Laboratory Tests


8/12/19 06:53: 


White Blood Count 7.0, Red Blood Count 4.00L, Hemoglobin 10.8L, Hematocrit 34.5L

, Mean Corpuscular Volume 86, Mean Corpuscular Hemoglobin 26.9L, Mean 

Corpuscular Hemoglobin Concent 31.2L, Red Cell Distribution Width 13.6, 

Platelet Count 263, Mean Platelet Volume 5.8L, Neutrophils (%) (Auto) 70.0, 

Lymphocytes (%) (Auto) 13.6L, Monocytes (%) (Auto) 9.8, Eosinophils (%) (Auto) 

5.5H, Basophils (%) (Auto) 1.1, Sodium Level 141, Potassium Level 4.0, Chloride 

Level 107, Carbon Dioxide Level 25, Anion Gap 9, Blood Urea Nitrogen 18, 

Creatinine 1.4H, Estimat Glomerular Filtration Rate , Glucose Level 90, Calcium 

Level 8.6


Height (Feet):  5


Height (Inches):  10.00


Weight (Pounds):  150


General Appearance:  no apparent distress


Objective


no change











Humberto De Jesus MD Aug 12, 2019 12:08

## 2019-08-12 NOTE — PULMONOLOGY PROGRESS NOTE
Assessment/Plan


Problems:  


(1) Pneumonia


(2) History of hypertension


(3) History of CVA (cerebrovascular accident)


Assessment/Plan


doing better


respiratory treatment


check electrolytes


dvt prophylaxis


no new complains





Subjective


ROS Limited/Unobtainable:  No


Constitutional:  Reports: no symptoms


HEENT:  Repors: no symptoms


Respiratory:  Reports: no symptoms


Allergies:  


Coded Allergies:  


     No Known Allergies (Unverified , 8/9/19)





Objective





Last 24 Hour Vital Signs








  Date Time  Temp Pulse Resp B/P (MAP) Pulse Ox O2 Delivery O2 Flow Rate FiO2


 


8/12/19 09:00      Room Air  





      Room Air  


 


8/12/19 08:47  92  132/73    


 


8/12/19 08:47  92  132/73    


 


8/12/19 08:00  92      


 


8/12/19 08:00 96.7 92 20 132/73 (92) 98   


 


8/12/19 06:50  71 15  98 Room Air  21


 


8/12/19 06:04    153/86    


 


8/12/19 04:00 97.7 69 18 147/77 (100) 96   


 


8/12/19 04:00  69      


 


8/12/19 00:00 98.0 75 20 143/71 (95) 97   


 


8/12/19 00:00  75      


 


8/11/19 23:44    185/86    


 


8/11/19 22:33    182/80    


 


8/11/19 21:31  79  187/89    


 


8/11/19 21:17  81 18  98 Room Air  21


 


8/11/19 21:00      Room Air  





      Room Air  


 


8/11/19 20:00 97.8 66 20 180/84 (116) 96   


 


8/11/19 20:00  66      


 


8/11/19 18:50  79  112/67    


 


8/11/19 16:00 98.7 79 18 112/69 (83) 97   


 


8/11/19 16:00  85      


 


8/11/19 14:20  81 16  100 Room Air  21


 


8/11/19 14:11  79 19  97 Room Air  21


 


8/11/19 14:09  79 18  97 Room Air  21


 


8/11/19 13:47    135/74    


 


8/11/19 12:00  65      


 


8/11/19 12:00 97.0 67 18 135/74 (94) 96   

















Intake and Output  


 


 8/11/19 8/12/19





 19:00 07:00


 


Intake Total 240 ml 720 ml


 


Balance 240 ml 720 ml


 


  


 


Intake Oral 240 ml 720 ml


 


# Voids  5








General Appearance:  WD/WN


HEENT:  normocephalic


Respiratory/Chest:  lungs clear, normal breath sounds


Cardiovascular:  normal rate, regular rhythm


Abdomen:  normal bowel sounds, soft, non tender


Extremities:  no cyanosis


Neurologic/Psychiatric:  CNs II-XII grossly normal


Laboratory Tests


8/12/19 06:53: 


White Blood Count 7.0, Red Blood Count 4.00L, Hemoglobin 10.8L, Hematocrit 34.5L

, Mean Corpuscular Volume 86, Mean Corpuscular Hemoglobin 26.9L, Mean 

Corpuscular Hemoglobin Concent 31.2L, Red Cell Distribution Width 13.6, 

Platelet Count 263, Mean Platelet Volume 5.8L, Neutrophils (%) (Auto) 70.0, 

Lymphocytes (%) (Auto) 13.6L, Monocytes (%) (Auto) 9.8, Eosinophils (%) (Auto) 

5.5H, Basophils (%) (Auto) 1.1, Sodium Level 141, Potassium Level 4.0, Chloride 

Level 107, Carbon Dioxide Level 25, Anion Gap 9, Blood Urea Nitrogen 18, 

Creatinine 1.4H, Estimat Glomerular Filtration Rate , Glucose Level 90, Calcium 

Level 8.6





Current Medications








 Medications


  (Trade)  Dose


 Ordered  Sig/Arlen


 Route


 PRN Reason  Start Time


 Stop Time Status Last Admin


Dose Admin


 


 Acetaminophen


  (Tylenol)  650 mg  Q4H  PRN


 ORAL


 T>100.5  8/9/19 11:00


 9/8/19 10:59   


 


 


 Albuterol/


 Ipratropium


  (Albuterol/


 Ipratropium)  3 ml  Q4H  PRN


 HHN


 Shortness of Breath  8/9/19 11:00


 8/14/19 10:59  8/11/19 14:09


 


 


 Amlodipine


 Besylate


  (Norvasc)  5 mg  BID


 ORAL


   8/10/19 09:00


 9/9/19 08:59  8/12/19 08:47


 


 


 Cephalexin


  (Keflex)  500 mg  EVERY 8  HOURS


 ORAL


   8/9/19 22:00


 8/16/19 21:59  8/12/19 06:04


 


 


 Clonidine HCl


  (Catapres Tab)  0.1 mg  Q4H  PRN


 ORAL


 SBP>160  8/10/19 06:59


 9/9/19 06:58  8/11/19 23:44


 


 


 Dextrose


  (Dextrose 50%)  25 ml  Q30M  PRN


 IV


 Hypoglycemia  8/9/19 11:00


 9/8/19 10:59   


 


 


 Dextrose


  (Dextrose 50%)  50 ml  Q30M  PRN


 IV


 Hypoglycemia  8/9/19 11:00


 9/8/19 10:59   


 


 


 Folic Acid


  (Folate)  2 mg  DAILY


 ORAL


   8/10/19 09:15


 9/9/19 09:14  8/12/19 08:47


 


 


 Heparin Sodium


  (Porcine)


  (Heparin 5000


 units/ml)  5,000 units  EVERY 12  HOURS


 SUBQ


   8/9/19 11:00


 9/8/19 10:59  8/12/19 08:49


 


 


 Hydralazine HCl


  (Apresoline)  50 mg  Q8HR


 ORAL


   8/10/19 14:00


 9/8/19 17:59  8/12/19 06:04


 


 


 Metoprolol


 Tartrate


  (Lopressor)  12.5 mg  Q12HR


 ORAL


   8/10/19 09:15


 9/9/19 09:14  8/12/19 08:47


 


 


 Morphine Sulfate


  (Morphine


 Sulfate)  2 mg  Q4H  PRN


 IVP


 Severe Pain (Pain Scale 7-10)  8/9/19 11:00


 8/16/19 10:59   


 


 


 Ondansetron HCl


  (Zofran)  4 mg  Q6H  PRN


 IVP


 Nausea & Vomiting  8/9/19 11:00


 9/8/19 10:59   


 


 


 Polyethylene


 Glycol


  (Miralax)  17 gm  DAILYPRN  PRN


 ORAL


 Constipation  8/9/19 11:00


 9/8/19 10:59   


 

















Kolton Tristan MD Aug 12, 2019 11:13

## 2019-08-12 NOTE — CARDIOLOGY REPORT
--------------- APPROVED REPORT --------------





EXAM: Two-dimensional and M-mode echocardiogram with Doppler and color Doppler.



INDICATION

Left ventricular function



M-Mode DIMENSIONS 

IVSd1.5 (0.7-1.1cm)Left Atrium (MM)4.4 (1.6-4.0cm)

LVDd4.3 (3.5-5.6cm)Aortic Root3.0 (2.0-3.7cm)

PWd1.5 (0.7-1.1cm)Aortic Cusp Exc.1.6 (1.5-2.0cm)



LVDs2.0 (2.5-4.0cm)

PWs2.8 cm





Normal left ventricular chamber size, systolic function and wall motion.

Left ventricular ejection fraction estimated to be 60 %.

Mild left ventricular hypertrophy.

Anterior Echo-free space, may be due to pericardial fat or effusion.

Left atrial size at upper limits of normal.

Right cardiac chamber sizes are within normal limits.

Aortic valve calcification with decreased cusp excursion c/w aortic stenosis.

Thickened mitral valve leaflets with normal excursion.

Mitral annulus and aortic root calcification.

Pulmonic valve not visualized.

Normal tricuspid valve structure.  

IVC dilated at 2.4 cm without physiological collapse. 



A  color flow and spectral Doppler study was performed and revealed:

Mild to moderate aortic regurgitation.

Peak aortic valve gradient of 19 mmHg and a mean of 10 mmHg.

Aortic valve area 1.4 cm2 calculated by continuity equation.

Trace mitral regurgitation.

Mitral diastolic velocities suggest mild left ventricular diastolic dysfunction (Grade 

I).  

Trace tricuspid regurgitation.

Tricuspid systolic velocities suggests peak right ventricular systolic pressure of 26 

mmHg.

## 2019-08-12 NOTE — CARDIOLOGY PROGRESS NOTE
Assessment/Plan


Assessment/Plan


1. Non-syncopal fall.


2. Right dorsal dislocation of PIP joint of the index finger.


3. History of CVA with carotid endarterectomy.


4. History of hypertension.











seems to be doign ok 


cr imporved 


ct of chest and neck noted 


bp seem ok on combo of norvasc anxc hydaraalzine 


to start on acei or arbs when oked with dr cuadra 


dc plan when ok with dr valencia





Subjective


Cardiovascular:  Denies: chest pain


Respiratory:  Denies: shortness of breath


Gastrointestinal/Abdominal:  Denies: abdominal pain


Genitourinary:  Denies: burning





Objective





Last 24 Hour Vital Signs








  Date Time  Temp Pulse Resp B/P (MAP) Pulse Ox O2 Delivery O2 Flow Rate FiO2


 


8/12/19 17:19  76  145/60    


 


8/12/19 16:00  76      


 


8/12/19 16:00 96.9 72 20 145/60 (88) 99   


 


8/12/19 13:07    148/56    


 


8/12/19 12:00  58      


 


8/12/19 12:00 96.8 51 20 148/56 (86) 98   


 


8/12/19 09:00      Room Air  





      Room Air  


 


8/12/19 08:47  92  132/73    


 


8/12/19 08:47  92  132/73    


 


8/12/19 08:00  92      


 


8/12/19 08:00 96.7 92 20 132/73 (92) 98   


 


8/12/19 06:50  71 15  98 Room Air  21


 


8/12/19 06:04    153/86    


 


8/12/19 04:00 97.7 69 18 147/77 (100) 96   


 


8/12/19 04:00  69      


 


8/12/19 00:00 98.0 75 20 143/71 (95) 97   


 


8/12/19 00:00  75      


 


8/11/19 23:44    185/86    


 


8/11/19 22:33    182/80    


 


8/11/19 21:31  79  187/89    


 


8/11/19 21:17  81 18  98 Room Air  21


 


8/11/19 21:00      Room Air  





      Room Air  


 


8/11/19 20:00 97.8 66 20 180/84 (116) 96   


 


8/11/19 20:00  66      








General Appearance:  no apparent distress, alert


Neck:  supple


Cardiovascular:  normal rate


Respiratory/Chest:  lungs clear


Abdomen:  normal bowel sounds, non tender, soft


Extremities:  no swelling











Intake and Output  


 


 8/11/19 8/12/19





 18:59 06:59


 


Intake Total 360 ml 720 ml


 


Balance 360 ml 720 ml


 


  


 


Intake Oral 360 ml 720 ml


 


# Voids  5











Laboratory Tests








Test


  8/12/19


06:53


 


White Blood Count


  7.0 K/UL


(4.8-10.8)


 


Red Blood Count


  4.00 M/UL


(4.70-6.10)  L


 


Hemoglobin


  10.8 G/DL


(14.2-18.0)  L


 


Hematocrit


  34.5 %


(42.0-52.0)  L


 


Mean Corpuscular Volume 86 FL (80-99)  


 


Mean Corpuscular Hemoglobin


  26.9 PG


(27.0-31.0)  L


 


Mean Corpuscular Hemoglobin


Concent 31.2 G/DL


(32.0-36.0)  L


 


Red Cell Distribution Width


  13.6 %


(11.6-14.8)


 


Platelet Count


  263 K/UL


(150-450)


 


Mean Platelet Volume


  5.8 FL


(6.5-10.1)  L


 


Neutrophils (%) (Auto)


  70.0 %


(45.0-75.0)


 


Lymphocytes (%) (Auto)


  13.6 %


(20.0-45.0)  L


 


Monocytes (%) (Auto)


  9.8 %


(1.0-10.0)


 


Eosinophils (%) (Auto)


  5.5 %


(0.0-3.0)  H


 


Basophils (%) (Auto)


  1.1 %


(0.0-2.0)


 


Sodium Level


  141 MMOL/L


(136-145)


 


Potassium Level


  4.0 MMOL/L


(3.5-5.1)


 


Chloride Level


  107 MMOL/L


()


 


Carbon Dioxide Level


  25 MMOL/L


(21-32)


 


Anion Gap


  9 mmol/L


(5-15)


 


Blood Urea Nitrogen


  18 mg/dL


(7-18)


 


Creatinine


  1.4 MG/DL


(0.55-1.30)  H


 


Estimat Glomerular Filtration


Rate  mL/min (>60)  


 


 


Glucose Level


  90 MG/DL


()


 


Calcium Level


  8.6 MG/DL


(8.5-10.1)

















Wali Oviedo MD Aug 12, 2019 19:56

## 2019-08-12 NOTE — NUR
NURSE NOTES:

Received patient from Magalis Swenson. Patient awake in bed eating his breakfast. No c/o pain or 
discomfort. Safety precautions in place. side rails X2 Up.Bed locked to lowest position Call 
bell within patients reach. Will continue to follow.

## 2019-08-13 NOTE — PULMONOLOGY PROGRESS NOTE
Assessment/Plan


Problems:  


(1) Pneumonia


(2) History of hypertension


(3) History of CVA (cerebrovascular accident)


Assessment/Plan


doing better


respiratory treatment


check electrolytes


dvt prophylaxis


no new complains





needs placement





Subjective


ROS Limited/Unobtainable:  No


Constitutional:  Reports: no symptoms


HEENT:  Repors: no symptoms


Respiratory:  Reports: no symptoms


Allergies:  


Coded Allergies:  


     No Known Allergies (Unverified , 8/9/19)





Objective





Last 24 Hour Vital Signs








  Date Time  Temp Pulse Resp B/P (MAP) Pulse Ox O2 Delivery O2 Flow Rate FiO2


 


8/13/19 12:00 98.9 63 17 140/63 (88) 96   


 


8/13/19 10:10 97.0 76 20 150/69 (96) 95   


 


8/13/19 09:25    119/83    


 


8/13/19 09:25  83  119/83    


 


8/13/19 09:25  83  119/83    


 


8/13/19 08:51      Room Air  





      Room Air  


 


8/13/19 08:10 97.3 83 20 119/83 (95) 95   


 


8/13/19 05:52    148/67    


 


8/13/19 04:00 98.1 71 18 148/67 (94) 95   


 


8/13/19 04:00  74      


 


8/13/19 00:00  70      


 


8/13/19 00:00 97.2 77 18 141/73 (95) 97   


 


8/12/19 21:28    139/70    


 


8/12/19 21:28  74  139/70    


 


8/12/19 21:00      Room Air  





      Room Air  


 


8/12/19 20:00  73      


 


8/12/19 20:00 98.6 74 19 139/70 (93) 97   


 


8/12/19 19:45  63 18  97 Room Air  21


 


8/12/19 17:19  76  145/60    


 


8/12/19 16:00  76      


 


8/12/19 16:00 96.9 72 20 145/60 (88) 99   

















Intake and Output  


 


 8/12/19 8/13/19





 19:00 07:00


 


Intake Total 890 ml 


 


Output Total 300 ml 950 ml


 


Balance 590 ml -950 ml


 


  


 


Intake Oral 890 ml 


 


Output Urine Total 300 ml 950 ml


 


# Voids 2 3


 


# Bowel Movements 1 








General Appearance:  WD/WN


HEENT:  normocephalic, atraumatic


Respiratory/Chest:  chest wall non-tender, lungs clear


Cardiovascular:  normal peripheral pulses, normal rate


Abdomen:  normal bowel sounds, soft, non tender, no scars


Extremities:  no clubbing


Skin:  no rash


Laboratory Tests


8/13/19 06:45: 


White Blood Count 6.8, Red Blood Count 3.69L, Hemoglobin 9.9L, Hematocrit 31.5L

, Mean Corpuscular Volume 85, Mean Corpuscular Hemoglobin 26.9L, Mean 

Corpuscular Hemoglobin Concent 31.6L, Red Cell Distribution Width 13.8, 

Platelet Count 255, Mean Platelet Volume 5.6L, Neutrophils (%) (Auto) 67.4, 

Lymphocytes (%) (Auto) 14.1L, Monocytes (%) (Auto) 10.4H, Eosinophils (%) (Auto

) 6.3H, Basophils (%) (Auto) 1.7, Sodium Level 141, Potassium Level 4.2, 

Chloride Level 109H, Carbon Dioxide Level 22, Anion Gap 10, Blood Urea Nitrogen 

33H, Creatinine 1.8H, Estimat Glomerular Filtration Rate , Glucose Level 93, 

Calcium Level 8.2L





Current Medications








 Medications


  (Trade)  Dose


 Ordered  Sig/Arlen


 Route


 PRN Reason  Start Time


 Stop Time Status Last Admin


Dose Admin


 


 Acetaminophen


  (Tylenol)  650 mg  Q4H  PRN


 ORAL


 T>100.5  8/13/19 10:14


 9/12/19 10:13   


 


 


 Albuterol/


 Ipratropium


  (Albuterol/


 Ipratropium)  3 ml  Q4H  PRN


 HHN


 Shortness of Breath  8/13/19 10:15


 8/18/19 10:14   


 


 


 Amlodipine


 Besylate


  (Norvasc)  5 mg  BID


 ORAL


   8/13/19 18:00


 9/9/19 08:59   


 


 


 Cephalexin


  (Keflex)  500 mg  EVERY 8  HOURS


 ORAL


   8/13/19 14:00


 8/16/19 21:59  8/13/19 13:02


 


 


 Clonidine HCl


  (Catapres Tab)  0.1 mg  Q4H  PRN


 ORAL


 SBP>160  8/13/19 10:14


 9/12/19 10:13   


 


 


 Dextrose


  (Dextrose 50%)  25 ml  Q30M  PRN


 IV


 Hypoglycemia  8/13/19 10:30


 9/8/19 10:59   


 


 


 Dextrose


  (Dextrose 50%)  50 ml  Q30M  PRN


 IV


 Hypoglycemia  8/13/19 10:30


 9/8/19 10:59   


 


 


 Folic Acid


  (Folate)  2 mg  DAILY


 ORAL


   8/14/19 09:00


 9/9/19 09:14   


 


 


 Heparin Sodium


  (Porcine)


  (Heparin 5000


 units/ml)  5,000 units  EVERY 12  HOURS


 SUBQ


   8/13/19 21:00


 9/8/19 10:59   


 


 


 Hydralazine HCl


  (Apresoline)  50 mg  Q8HR


 ORAL


   8/13/19 14:00


 9/8/19 17:59   


 


 


 Lisinopril


  (Zestril)  10 mg  DAILY


 ORAL


   8/14/19 09:00


 9/12/19 08:59   


 


 


 Metoprolol


 Tartrate


  (Lopressor)  12.5 mg  Q12HR


 ORAL


   8/13/19 21:00


 9/9/19 09:14   


 


 


 Morphine Sulfate


  (Morphine


 Sulfate)  2 mg  Q4H  PRN


 IVP


 Severe Pain (Pain Scale 7-10)  8/13/19 10:15


 8/20/19 10:14   


 


 


 Ondansetron HCl


  (Zofran)  4 mg  Q6H  PRN


 IVP


 Nausea & Vomiting  8/13/19 11:00


 9/8/19 10:59   


 


 


 Polyethylene


 Glycol


  (Miralax)  17 gm  DAILYPRN  PRN


 ORAL


 Constipation  8/13/19 10:15


 9/12/19 10:14   


 

















Kolton Tristan MD Aug 13, 2019 14:07

## 2019-08-13 NOTE — NEPHROLOGY PROGRESS NOTE
Assessment/Plan


Problem List:  


(1) Renal failure (ARF), acute on chronic


Assessment:  cr up to 1.8





(2) History of hypertension


(3) Anemia


(4) History of CVA (cerebrovascular accident)


Assessment





Renal Failure-


? acute obn top of chronic


Anemia


HTN


h/o CVA


Rt 4th finger laceration: reason for admission


Plan





watch serum Cr on Ace I


Anemia farley bit of low folate


U Na


U Analysis


BP control, further adjustment done


Avoid Nephrotoxics


? DC planning?





Subjective


ROS Limited/Unobtainable:  No





Objective


Objective





Last 24 Hour Vital Signs








  Date Time  Temp Pulse Resp B/P (MAP) Pulse Ox O2 Delivery O2 Flow Rate FiO2


 


8/13/19 12:00 98.9 63 17 140/63 (88) 96   


 


8/13/19 10:10 97.0 76 20 150/69 (96) 95   


 


8/13/19 09:25    119/83    


 


8/13/19 09:25  83  119/83    


 


8/13/19 09:25  83  119/83    


 


8/13/19 08:51      Room Air  





      Room Air  


 


8/13/19 08:10 97.3 83 20 119/83 (95) 95   


 


8/13/19 05:52    148/67    


 


8/13/19 04:00 98.1 71 18 148/67 (94) 95   


 


8/13/19 04:00  74      


 


8/13/19 00:00  70      


 


8/13/19 00:00 97.2 77 18 141/73 (95) 97   


 


8/12/19 21:28    139/70    


 


8/12/19 21:28  74  139/70    


 


8/12/19 21:00      Room Air  





      Room Air  


 


8/12/19 20:00  73      


 


8/12/19 20:00 98.6 74 19 139/70 (93) 97   


 


8/12/19 19:45  63 18  97 Room Air  21


 


8/12/19 17:19  76  145/60    


 


8/12/19 16:00  76      


 


8/12/19 16:00 96.9 72 20 145/60 (88) 99   


 


8/12/19 13:07    148/56    

















Intake and Output  


 


 8/12/19 8/13/19





 19:00 07:00


 


Intake Total 890 ml 


 


Output Total 300 ml 950 ml


 


Balance 590 ml -950 ml


 


  


 


Intake Oral 890 ml 


 


Output Urine Total 300 ml 950 ml


 


# Voids 2 3


 


# Bowel Movements 1 








Laboratory Tests


8/13/19 06:45: 


White Blood Count 6.8, Red Blood Count 3.69L, Hemoglobin 9.9L, Hematocrit 31.5L

, Mean Corpuscular Volume 85, Mean Corpuscular Hemoglobin 26.9L, Mean 

Corpuscular Hemoglobin Concent 31.6L, Red Cell Distribution Width 13.8, 

Platelet Count 255, Mean Platelet Volume 5.6L, Neutrophils (%) (Auto) 67.4, 

Lymphocytes (%) (Auto) 14.1L, Monocytes (%) (Auto) 10.4H, Eosinophils (%) (Auto

) 6.3H, Basophils (%) (Auto) 1.7, Sodium Level 141, Potassium Level 4.2, 

Chloride Level 109H, Carbon Dioxide Level 22, Anion Gap 10, Blood Urea Nitrogen 

33H, Creatinine 1.8H, Estimat Glomerular Filtration Rate , Glucose Level 93, 

Calcium Level 8.2L


Height (Feet):  5


Height (Inches):  10.00


Weight (Pounds):  150


General Appearance:  no apparent distress


Objective


no change











Humberto De Jesus MD Aug 13, 2019 12:07

## 2019-08-13 NOTE — NUR
NURSE NOTES:

Patient in bed, awake, alert and verbally responsive. Able to make needs known. No complaint 
of pain or discomfort noted at this time. Skin is warm and dry to touch. NOted with dressing 
on the right hand. Kept clean and comfortable. Bed in low and locked position. IV site 
noted. Will continue plan of care.

## 2019-08-13 NOTE — NUR
SS note

Patient has been unsteady, risk for falls and will need SNF. Patient stating he does not 
have his income for Transitional Housing at this time as well.
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
## 2019-08-13 NOTE — NUR
NURSE NOTES:

Patient refused to take Hydralazine 50mg for BP; RN explanied to patient for the risk of 
uncontrolled BP. Medication wasted. Will keep monitoring BP.

## 2019-08-13 NOTE — NUR
NURSE NOTES:

Patient awake, alert x3, on room air, no sing of distress and shortness of breath; no sing 
of chest pain; IV LAC 20G flushes well; vitals taken upon arrival to the unite; belonging 
lists singed by transferring and receiving nurse; patient's cane within reach; bed at lowest 
position, breaks engaged, side rails up x3, bed alarm on; call light within reach; will keep 
monitoring.

## 2019-08-13 NOTE — NUR
NURSE NOTES:

Pt in bed with HOB in semi fowlers, pt has bathroom privileges, pt Ox3, denies pain, labs 
WNL, pt is able to ambulated with steady gait, IV asymptomatic, call light at bedside, pt 
makes needs known, no s/s of distress or sob noted. pt is scheduled to d/c on wednesday to 
[facility].

## 2019-08-13 NOTE — NUR
NURSE NOTES:

Specimen collecting container provided to patient for OB Stool and Sputum. Patient is aware.

## 2019-08-13 NOTE — INFECTIOUS DISEASES PROG NOTE
Assessment/Plan


Assessment/Plan








Assessment:


Afebrile


No leukocytosis





R 4th finger pan-2ry to PIP dislocation and laceration, s/p reduction


  -repeat R hand xray: Successful reduction of the previously seen dislocation 

at the fourth proximal interphalangeal joint. No definite/displaced acute 

fracture.


  -xray R hand:  Dislocation at the fourth proximal interphalangeal joint. No 

definite/displaced associated acute fracture.Osteoarthrosis.


 


s/p Fall


  -Head CT: Exam limited by motion artifact. Within this limitations:No 

evidence of acute intracranial hemorrhage, mass effect or cortical edema. MRI 

maybe obtained for more sensitive evaluation as clinically indicated. Atrophy 

and nonspecific periventricular hypoattenuation suggestive of chronic ischemic 

microvascular changes. Likely chronic infarct in the right frontal lobe. Left 

supraorbital soft tissue swelling. No depressed skull fracture.


  -CT chest: No discrete mass.Pulmonary emphysema.Cardiomegaly.  Small 

pericardial fluid.Small left and trace right pleural effusions.  Right renal 

cyst. 


  -CT neck: No discrete mass. Pulmonary emphysema. Cervical spondylosis with 

central canal and foraminal stenosis.


 





Dyspnea


  -CXR:   Nonspecific mild interstitial prominence, possibly chronic. No 

definite focal consolidation, pleural effusion, pneumothorax or evidence of 

alveolar edema. Borderline cardiomegaly, possibly exaggerated by AP 

technique.Bilateral hilar prominence most likely related to ectatic pulmonary 

vasculature.








VIKASH, improving


   -u/a neg


   -Renal US:  Slightly increased echogenicity within the kidneys which can be 

seen in the setting of medical renal disease.  No hydronephrosis.  Cyst within 

the right renal upper pole.  No stones.  Unremarkable appearance of the 

bladder. 


 





CVA w L side weakness


HTN


 appendectomy





Plan:


-Continue prophylactic PO Keflex #5/5


    -8/9 IV Vancomycin, Ceftriaxone #1, Cefepime x1





-f/u cx


-Monitor CBC/CMP, temperatures


-wound care





Thank you for this consultation. Will continue to follow along with  you.





Discussed with RN.





Subjective


Allergies:  


Coded Allergies:  


     No Known Allergies (Unverified , 8/9/19)


Subjective


afebrile


no leukocytosis





Objective


Vital Signs





Last 24 Hour Vital Signs








  Date Time  Temp Pulse Resp B/P (MAP) Pulse Ox O2 Delivery O2 Flow Rate FiO2


 


8/13/19 10:10 97.0 76 20 150/69 (96) 95   


 


8/13/19 09:25    119/83    


 


8/13/19 09:25  83  119/83    


 


8/13/19 09:25  83  119/83    


 


8/13/19 08:51      Room Air  





      Room Air  


 


8/13/19 08:10 97.3 83 20 119/83 (95) 95   


 


8/13/19 05:52    148/67    


 


8/13/19 04:00 98.1 71 18 148/67 (94) 95   


 


8/13/19 04:00  74      


 


8/13/19 00:00  70      


 


8/13/19 00:00 97.2 77 18 141/73 (95) 97   


 


8/12/19 21:28    139/70    


 


8/12/19 21:28  74  139/70    


 


8/12/19 21:00      Room Air  





      Room Air  


 


8/12/19 20:00  73      


 


8/12/19 20:00 98.6 74 19 139/70 (93) 97   


 


8/12/19 19:45  63 18  97 Room Air  21


 


8/12/19 17:19  76  145/60    


 


8/12/19 16:00  76      


 


8/12/19 16:00 96.9 72 20 145/60 (88) 99   


 


8/12/19 13:07    148/56    


 


8/12/19 12:00  58      


 


8/12/19 12:00 96.8 51 20 148/56 (86) 98   








Height (Feet):  5


Height (Inches):  10.00


Weight (Pounds):  150


Objective





General Appearance:  thin


Lines, tubes and drains:  peripheral, PICC


HEENT:  normocephalic, atraumatic


Neck:  non-tender, supple


Respiratory/Chest:  chest wall non-tender, lungs clear


Cardiovascular/Chest:  normal peripheral pulses


Abdomen:  normal bowel sounds


Extremities:  , laceration - R 4th finger laceration





Laboratory Tests








Test


  8/13/19


06:45


 


White Blood Count


  6.8 K/UL


(4.8-10.8)


 


Red Blood Count


  3.69 M/UL


(4.70-6.10)  L


 


Hemoglobin


  9.9 G/DL


(14.2-18.0)  L


 


Hematocrit


  31.5 %


(42.0-52.0)  L


 


Mean Corpuscular Volume 85 FL (80-99)  


 


Mean Corpuscular Hemoglobin


  26.9 PG


(27.0-31.0)  L


 


Mean Corpuscular Hemoglobin


Concent 31.6 G/DL


(32.0-36.0)  L


 


Red Cell Distribution Width


  13.8 %


(11.6-14.8)


 


Platelet Count


  255 K/UL


(150-450)


 


Mean Platelet Volume


  5.6 FL


(6.5-10.1)  L


 


Neutrophils (%) (Auto)


  67.4 %


(45.0-75.0)


 


Lymphocytes (%) (Auto)


  14.1 %


(20.0-45.0)  L


 


Monocytes (%) (Auto)


  10.4 %


(1.0-10.0)  H


 


Eosinophils (%) (Auto)


  6.3 %


(0.0-3.0)  H


 


Basophils (%) (Auto)


  1.7 %


(0.0-2.0)


 


Sodium Level


  141 MMOL/L


(136-145)


 


Potassium Level


  4.2 MMOL/L


(3.5-5.1)


 


Chloride Level


  109 MMOL/L


()  H


 


Carbon Dioxide Level


  22 MMOL/L


(21-32)


 


Anion Gap


  10 mmol/L


(5-15)


 


Blood Urea Nitrogen


  33 mg/dL


(7-18)  H


 


Creatinine


  1.8 MG/DL


(0.55-1.30)  H


 


Estimat Glomerular Filtration


Rate  mL/min (>60)  


 


 


Glucose Level


  93 MG/DL


()


 


Calcium Level


  8.2 MG/DL


(8.5-10.1)  L











Current Medications








 Medications


  (Trade)  Dose


 Ordered  Sig/Arlen


 Route


 PRN Reason  Start Time


 Stop Time Status Last Admin


Dose Admin


 


 Acetaminophen


  (Tylenol)  650 mg  Q4H  PRN


 ORAL


 T>100.5  8/13/19 10:14


 9/12/19 10:13   


 


 


 Albuterol/


 Ipratropium


  (Albuterol/


 Ipratropium)  3 ml  Q4H  PRN


 HHN


 Shortness of Breath  8/13/19 10:15


 8/18/19 10:14   


 


 


 Amlodipine


 Besylate


  (Norvasc)  5 mg  BID


 ORAL


   8/13/19 18:00


 9/9/19 08:59   


 


 


 Cephalexin


  (Keflex)  500 mg  EVERY 8  HOURS


 ORAL


   8/13/19 14:00


 8/16/19 21:59   


 


 


 Clonidine HCl


  (Catapres Tab)  0.1 mg  Q4H  PRN


 ORAL


 SBP>160  8/13/19 10:14


 9/12/19 10:13   


 


 


 Dextrose


  (Dextrose 50%)  25 ml  Q30M  PRN


 IV


 Hypoglycemia  8/13/19 10:30


 9/8/19 10:59   


 


 


 Dextrose


  (Dextrose 50%)  50 ml  Q30M  PRN


 IV


 Hypoglycemia  8/13/19 10:30


 9/8/19 10:59   


 


 


 Folic Acid


  (Folate)  2 mg  DAILY


 ORAL


   8/14/19 09:00


 9/9/19 09:14   


 


 


 Heparin Sodium


  (Porcine)


  (Heparin 5000


 units/ml)  5,000 units  EVERY 12  HOURS


 SUBQ


   8/13/19 21:00


 9/8/19 10:59   


 


 


 Hydralazine HCl


  (Apresoline)  50 mg  Q8HR


 ORAL


   8/13/19 14:00


 9/8/19 17:59   


 


 


 Lisinopril


  (Zestril)  10 mg  DAILY


 ORAL


   8/14/19 09:00


 9/12/19 08:59   


 


 


 Metoprolol


 Tartrate


  (Lopressor)  12.5 mg  Q12HR


 ORAL


   8/13/19 21:00


 9/9/19 09:14   


 


 


 Morphine Sulfate


  (Morphine


 Sulfate)  2 mg  Q4H  PRN


 IVP


 Severe Pain (Pain Scale 7-10)  8/13/19 10:15


 8/20/19 10:14   


 


 


 Ondansetron HCl


  (Zofran)  4 mg  Q6H  PRN


 IVP


 Nausea & Vomiting  8/13/19 11:00


 9/8/19 10:59   


 


 


 Polyethylene


 Glycol


  (Miralax)  17 gm  DAILYPRN  PRN


 ORAL


 Constipation  8/13/19 10:15


 9/12/19 10:14   


 

















Alexa Davila M.D. Aug 13, 2019 11:04

## 2019-08-13 NOTE — INTERNAL MED PROGRESS NOTE
Subjective


Physician Name


Piero Harman


Attending Physician


Frandy Vuong MD





Current Medications








 Medications


  (Trade)  Dose


 Ordered  Sig/Arlen


 Route


 PRN Reason  Start Time


 Stop Time Status Last Admin


Dose Admin


 


 Acetaminophen


  (Tylenol)  650 mg  Q4H  PRN


 ORAL


 T>100.5  8/13/19 10:14


 9/12/19 10:13   


 


 


 Albuterol/


 Ipratropium


  (Albuterol/


 Ipratropium)  3 ml  Q4H  PRN


 HHN


 Shortness of Breath  8/13/19 10:15


 8/18/19 10:14   


 


 


 Amlodipine


 Besylate


  (Norvasc)  5 mg  BID


 ORAL


   8/13/19 18:00


 9/9/19 08:59   


 


 


 Cephalexin


  (Keflex)  500 mg  EVERY 8  HOURS


 ORAL


   8/13/19 14:00


 8/16/19 21:59  8/13/19 13:02


 


 


 Clonidine HCl


  (Catapres Tab)  0.1 mg  Q4H  PRN


 ORAL


 SBP>160  8/13/19 10:14


 9/12/19 10:13   


 


 


 Dextrose


  (Dextrose 50%)  25 ml  Q30M  PRN


 IV


 Hypoglycemia  8/13/19 10:30


 9/8/19 10:59   


 


 


 Dextrose


  (Dextrose 50%)  50 ml  Q30M  PRN


 IV


 Hypoglycemia  8/13/19 10:30


 9/8/19 10:59   


 


 


 Folic Acid


  (Folate)  2 mg  DAILY


 ORAL


   8/14/19 09:00


 9/9/19 09:14   


 


 


 Heparin Sodium


  (Porcine)


  (Heparin 5000


 units/ml)  5,000 units  EVERY 12  HOURS


 SUBQ


   8/13/19 21:00


 9/8/19 10:59   


 


 


 Hydralazine HCl


  (Apresoline)  50 mg  Q8HR


 ORAL


   8/13/19 14:00


 9/8/19 17:59   


 


 


 Lisinopril


  (Zestril)  10 mg  DAILY


 ORAL


   8/14/19 09:00


 9/12/19 08:59   


 


 


 Metoprolol


 Tartrate


  (Lopressor)  12.5 mg  Q12HR


 ORAL


   8/13/19 21:00


 9/9/19 09:14   


 


 


 Morphine Sulfate


  (Morphine


 Sulfate)  2 mg  Q4H  PRN


 IVP


 Severe Pain (Pain Scale 7-10)  8/13/19 10:15


 8/20/19 10:14   


 


 


 Ondansetron HCl


  (Zofran)  4 mg  Q6H  PRN


 IVP


 Nausea & Vomiting  8/13/19 11:00


 9/8/19 10:59   


 


 


 Polyethylene


 Glycol


  (Miralax)  17 gm  DAILYPRN  PRN


 ORAL


 Constipation  8/13/19 10:15


 9/12/19 10:14   


 








Allergies:  


Coded Allergies:  


     No Known Allergies (Unverified , 8/9/19)


Subjective


83 YO M admitted after mechanical fall.  Now fractured finger.  Cover for Int 

[facility]-Dr Vuong





Objective





Last Vital Signs








  Date Time  Temp Pulse Resp B/P (MAP) Pulse Ox O2 Delivery O2 Flow Rate FiO2


 


8/13/19 16:00 97.5 65 19 142/66 (91) 99   


 


8/13/19 08:51      Room Air  





      Room Air  


 


8/12/19 19:45        21











Laboratory Tests








Test


  8/13/19


06:45


 


White Blood Count


  6.8 K/UL


(4.8-10.8)


 


Red Blood Count


  3.69 M/UL


(4.70-6.10)  L


 


Hemoglobin


  9.9 G/DL


(14.2-18.0)  L


 


Hematocrit


  31.5 %


(42.0-52.0)  L


 


Mean Corpuscular Volume 85 FL (80-99)  


 


Mean Corpuscular Hemoglobin


  26.9 PG


(27.0-31.0)  L


 


Mean Corpuscular Hemoglobin


Concent 31.6 G/DL


(32.0-36.0)  L


 


Red Cell Distribution Width


  13.8 %


(11.6-14.8)


 


Platelet Count


  255 K/UL


(150-450)


 


Mean Platelet Volume


  5.6 FL


(6.5-10.1)  L


 


Neutrophils (%) (Auto)


  67.4 %


(45.0-75.0)


 


Lymphocytes (%) (Auto)


  14.1 %


(20.0-45.0)  L


 


Monocytes (%) (Auto)


  10.4 %


(1.0-10.0)  H


 


Eosinophils (%) (Auto)


  6.3 %


(0.0-3.0)  H


 


Basophils (%) (Auto)


  1.7 %


(0.0-2.0)


 


Sodium Level


  141 MMOL/L


(136-145)


 


Potassium Level


  4.2 MMOL/L


(3.5-5.1)


 


Chloride Level


  109 MMOL/L


()  H


 


Carbon Dioxide Level


  22 MMOL/L


(21-32)


 


Anion Gap


  10 mmol/L


(5-15)


 


Blood Urea Nitrogen


  33 mg/dL


(7-18)  H


 


Creatinine


  1.8 MG/DL


(0.55-1.30)  H


 


Estimat Glomerular Filtration


Rate  mL/min (>60)  


 


 


Glucose Level


  93 MG/DL


()


 


Calcium Level


  8.2 MG/DL


(8.5-10.1)  L

















Intake and Output  


 


 8/12/19 8/13/19





 19:00 07:00


 


Intake Total 890 ml 


 


Output Total 300 ml 950 ml


 


Balance 590 ml -950 ml


 


  


 


Intake Oral 890 ml 


 


Output Urine Total 300 ml 950 ml


 


# Voids 2 3


 


# Bowel Movements 1 








Objective


PHYSICAL EXAMINATION:


GENERAL:  The patient is awake and responsive, in no acute


distress.


HEAD AND NECK:  Pupils are reactive to light.  Extraocular movements


intact.


NECK:  Supple.  No JVD.


LUNGS:  Good air entry.  No wheezing or rales.


HEART:  Reveals S1, S2.  Distant heart sounds.  No gallops.


ABDOMEN:  Soft, nondistended, and nontender.  Positive bowel


sounds.


EXTREMITIES:  No cyanosis, clubbing, or edema.  Right hand fourth finger


with laceration and dislocation, finger has been reduced.


RECTAL/GENITOURINARY:  Refused and deferred.


PSYCHIATRIC:  Mood and affect is intact.





Assessment/Plan


Assessment/Plan


ASSESSMENT:


1. Status post mechanical fall with the right hand fourth finger


laceration as well as dislocation status post reduction.


2. Hypertension, uncontrolled.


3. History of CVA with left hemiparesis.


4. Acute on Chronic kidney failure


5. Anemia.





PLAN:  


1.  Admit the patient to monitor unit.


2.   Dr. Tristan pulmonary consultation as well as 


3.  Dr. Davila from Infectious Disease.


4.  Follow up with 2D echo.  


5.  Code status is Full Code.  


6.  DVT prophylaxis with heparin subcutaneous.  We will follow up with the 


7.  anemia workup in progress.  We will hold aspirin at this time due to the 

patient's anemia.  Follow up


with the PT and OT evaluation. 


8.  discharge planning:  skilled nursing facility


9.  Renal=Piero Easton MD Aug 13, 2019 17:02

## 2019-08-14 NOTE — PULMONOLOGY PROGRESS NOTE
Assessment/Plan


Problems:  


(1) Pneumonia


(2) History of hypertension


(3) History of CVA (cerebrovascular accident)


Assessment/Plan


no new complains


doing better


respiratory treatment


check electrolytes


dvt prophylaxis


no new complains





needs placement





Subjective


ROS Limited/Unobtainable:  No


Constitutional:  Reports: no symptoms


HEENT:  Repors: no symptoms


Respiratory:  Reports: no symptoms


Allergies:  


Coded Allergies:  


     No Known Allergies (Unverified , 8/9/19)





Objective





Last 24 Hour Vital Signs








  Date Time  Temp Pulse Resp B/P (MAP) Pulse Ox O2 Delivery O2 Flow Rate FiO2


 


8/14/19 13:05    158/59    


 


8/14/19 12:00 98.1 57 18 158/59 (92) 97   


 


8/14/19 09:00      Room Air  





      Room Air  


 


8/14/19 08:42    164/72    


 


8/14/19 08:42  68  164/72    


 


8/14/19 08:41  68  164/72    


 


8/14/19 08:35  69 18  96 Room Air  21


 


8/14/19 08:00 98.9 68 16 164/72 (102) 97   


 


8/14/19 05:34    108/66    


 


8/14/19 03:36 98.9 68 16 148/60 (89) 97   


 


8/14/19 00:00 97.7 58 16 142/61 (88) 96   


 


8/13/19 21:26  68  108/54    


 


8/13/19 21:25    108/54    


 


8/13/19 21:00      Room Air  





      Room Air  


 


8/13/19 20:06  68 18  97 Room Air  21


 


8/13/19 20:00 97.6 63 19 108/54 (72) 95   


 


8/13/19 16:00 97.5 65 19 142/66 (91) 99   

















Intake and Output  


 


 8/13/19 8/14/19





 19:00 07:00


 


Intake Total 240 ml 


 


Output Total 750 ml 


 


Balance -510 ml 


 


  


 


Intake Oral 240 ml 


 


Output Urine Total 750 ml 


 


# Voids 4 








General Appearance:  WD/WN


HEENT:  normocephalic, atraumatic, anicteric


Respiratory/Chest:  chest wall non-tender, lungs clear


Cardiovascular:  normal peripheral pulses, normal rate


Abdomen:  normal bowel sounds, soft, non tender


Extremities:  no cyanosis


Laboratory Tests


8/14/19 05:26: 


White Blood Count 6.0, Red Blood Count 3.43L, Hemoglobin 9.3L, Hematocrit 29.7L

, Mean Corpuscular Volume 87, Mean Corpuscular Hemoglobin 27.2, Mean 

Corpuscular Hemoglobin Concent 31.4L, Red Cell Distribution Width 13.7, 

Platelet Count 250, Mean Platelet Volume 5.5L, Neutrophils (%) (Auto) 63.2, 

Lymphocytes (%) (Auto) 18.8L, Monocytes (%) (Auto) 10.3H, Eosinophils (%) (Auto

) 6.5H, Basophils (%) (Auto) 1.2, Sodium Level 143, Potassium Level 4.1, 

Chloride Level 111H, Carbon Dioxide Level 23, Anion Gap 9, Blood Urea Nitrogen 

31H, Creatinine 1.7H, Estimat Glomerular Filtration Rate , Glucose Level 85, 

Uric Acid 6.5, Calcium Level 8.5, Phosphorus Level 3.5, Magnesium Level 2.0, 

Total Bilirubin 0.4, Aspartate Amino Transf (AST/SGOT) 21, Alanine 

Aminotransferase (ALT/SGPT) 17, Alkaline Phosphatase 48, C-Reactive Protein, 

Quantitative 1.9H, Pro-B-Type Natriuretic Peptide 737H, Total Protein 6.3L, 

Albumin 2.8L, Globulin 3.5, Albumin/Globulin Ratio 0.8L





Current Medications








 Medications


  (Trade)  Dose


 Ordered  Sig/Arlen


 Route


 PRN Reason  Start Time


 Stop Time Status Last Admin


Dose Admin


 


 Acetaminophen


  (Tylenol)  650 mg  Q4H  PRN


 ORAL


 T>100.5  8/13/19 10:14


 9/12/19 10:13   


 


 


 Albuterol/


 Ipratropium


  (Albuterol/


 Ipratropium)  3 ml  Q4H  PRN


 HHN


 Shortness of Breath  8/13/19 10:15


 8/18/19 10:14   


 


 


 Clonidine HCl


  (Catapres Tab)  0.1 mg  Q4H  PRN


 ORAL


 SBP>160  8/13/19 10:14


 9/12/19 10:13   


 


 


 Dextrose


  (Dextrose 50%)  25 ml  Q30M  PRN


 IV


 Hypoglycemia  8/13/19 10:30


 9/8/19 10:59   


 


 


 Dextrose


  (Dextrose 50%)  50 ml  Q30M  PRN


 IV


 Hypoglycemia  8/13/19 10:30


 9/8/19 10:59   


 


 


 Folic Acid


  (Folate)  2 mg  DAILY


 ORAL


   8/14/19 09:00


 9/9/19 09:14  8/14/19 08:42


 


 


 Heparin Sodium


  (Porcine)


  (Heparin 5000


 units/ml)  5,000 units  EVERY 12  HOURS


 SUBQ


   8/13/19 21:00


 9/8/19 10:59  8/14/19 08:43


 


 


 Hydralazine HCl


  (Apresoline)  75 mg  Q8HR


 ORAL


   8/14/19 14:00


 9/8/19 17:59  8/14/19 13:05


 


 


 Lisinopril


  (Zestril)  10 mg  DAILY


 ORAL


   8/14/19 09:00


 9/12/19 08:59  8/14/19 08:42


 


 


 Metoprolol


 Tartrate


  (Lopressor)  12.5 mg  Q12HR


 ORAL


   8/13/19 21:00


 9/9/19 09:14  8/14/19 08:41


 


 


 Morphine Sulfate


  (Morphine


 Sulfate)  2 mg  Q4H  PRN


 IVP


 Severe Pain (Pain Scale 7-10)  8/13/19 10:15


 8/20/19 10:14   


 


 


 Nifedipine


  (Procardia XL)  30 mg  BID


 ORAL


   8/14/19 18:00


 9/13/19 17:59   


 


 


 Ondansetron HCl


  (Zofran)  4 mg  Q6H  PRN


 IVP


 Nausea & Vomiting  8/13/19 11:00


 9/8/19 10:59   


 


 


 Polyethylene


 Glycol


  (Miralax)  17 gm  DAILYPRN  PRN


 ORAL


 Constipation  8/13/19 10:15


 9/12/19 10:14   


 

















Kolton Tristan MD Aug 14, 2019 13:52

## 2019-08-14 NOTE — INTERNAL MED PROGRESS NOTE
Subjective


Date of Service:  Aug 14, 2019


Physician Name


Piero Harman


Attending Physician


Frandy Vuong MD





Current Medications








 Medications


  (Trade)  Dose


 Ordered  Sig/Arlen


 Route


 PRN Reason  Start Time


 Stop Time Status Last Admin


Dose Admin


 


 Acetaminophen


  (Tylenol)  650 mg  Q4H  PRN


 ORAL


 T>100.5  8/13/19 10:14


 9/12/19 10:13   


 


 


 Albuterol/


 Ipratropium


  (Albuterol/


 Ipratropium)  3 ml  Q4H  PRN


 HHN


 Shortness of Breath  8/13/19 10:15


 8/18/19 10:14   


 


 


 Cephalexin


  (Keflex)  500 mg  EVERY 8  HOURS


 ORAL


   8/13/19 14:00


 8/16/19 21:59  8/14/19 05:34


 


 


 Clonidine HCl


  (Catapres Tab)  0.1 mg  Q4H  PRN


 ORAL


 SBP>160  8/13/19 10:14


 9/12/19 10:13   


 


 


 Dextrose


  (Dextrose 50%)  25 ml  Q30M  PRN


 IV


 Hypoglycemia  8/13/19 10:30


 9/8/19 10:59   


 


 


 Dextrose


  (Dextrose 50%)  50 ml  Q30M  PRN


 IV


 Hypoglycemia  8/13/19 10:30


 9/8/19 10:59   


 


 


 Folic Acid


  (Folate)  2 mg  DAILY


 ORAL


   8/14/19 09:00


 9/9/19 09:14  8/14/19 08:42


 


 


 Heparin Sodium


  (Porcine)


  (Heparin 5000


 units/ml)  5,000 units  EVERY 12  HOURS


 SUBQ


   8/13/19 21:00


 9/8/19 10:59  8/14/19 08:43


 


 


 Hydralazine HCl


  (Apresoline)  75 mg  Q8HR


 ORAL


   8/14/19 14:00


 9/8/19 17:59   


 


 


 Lisinopril


  (Zestril)  10 mg  DAILY


 ORAL


   8/14/19 09:00


 9/12/19 08:59  8/14/19 08:42


 


 


 Metoprolol


 Tartrate


  (Lopressor)  12.5 mg  Q12HR


 ORAL


   8/13/19 21:00


 9/9/19 09:14  8/14/19 08:41


 


 


 Morphine Sulfate


  (Morphine


 Sulfate)  2 mg  Q4H  PRN


 IVP


 Severe Pain (Pain Scale 7-10)  8/13/19 10:15


 8/20/19 10:14   


 


 


 Nifedipine


  (Procardia XL)  30 mg  BID


 ORAL


   8/14/19 18:00


 9/13/19 17:59   


 


 


 Ondansetron HCl


  (Zofran)  4 mg  Q6H  PRN


 IVP


 Nausea & Vomiting  8/13/19 11:00


 9/8/19 10:59   


 


 


 Polyethylene


 Glycol


  (Miralax)  17 gm  DAILYPRN  PRN


 ORAL


 Constipation  8/13/19 10:15


 9/12/19 10:14   


 








Allergies:  


Coded Allergies:  


     No Known Allergies (Unverified , 8/9/19)


ROS Limited/Unobtainable:  No


Constitutional:  Reports: no symptoms


HEENT:  Reports: no symptoms


Cardiovascular:  Reports: no symptoms


Respiratory:  Reports: no symptoms


Gastrointestinal/Abdominal:  Reports: no symptoms


Genitourinary:  Reports: no symptoms


Neurologic/Psychiatric:  Reports: no symptoms


Subjective


85 YO M admitted after mechanical fall.  Now fractured finger.  Cover for Int 

Med-Dr Vuong





Objective





Last Vital Signs








  Date Time  Temp Pulse Resp B/P (MAP) Pulse Ox O2 Delivery O2 Flow Rate FiO2


 


8/14/19 12:00 98.1 57 18 158/59 (92) 97   


 


8/14/19 09:00      Room Air  





      Room Air  


 


8/14/19 08:35        21











Laboratory Tests








Test


  8/14/19


05:26


 


White Blood Count


  6.0 K/UL


(4.8-10.8)


 


Red Blood Count


  3.43 M/UL


(4.70-6.10)  L


 


Hemoglobin


  9.3 G/DL


(14.2-18.0)  L


 


Hematocrit


  29.7 %


(42.0-52.0)  L


 


Mean Corpuscular Volume 87 FL (80-99)  


 


Mean Corpuscular Hemoglobin


  27.2 PG


(27.0-31.0)


 


Mean Corpuscular Hemoglobin


Concent 31.4 G/DL


(32.0-36.0)  L


 


Red Cell Distribution Width


  13.7 %


(11.6-14.8)


 


Platelet Count


  250 K/UL


(150-450)


 


Mean Platelet Volume


  5.5 FL


(6.5-10.1)  L


 


Neutrophils (%) (Auto)


  63.2 %


(45.0-75.0)


 


Lymphocytes (%) (Auto)


  18.8 %


(20.0-45.0)  L


 


Monocytes (%) (Auto)


  10.3 %


(1.0-10.0)  H


 


Eosinophils (%) (Auto)


  6.5 %


(0.0-3.0)  H


 


Basophils (%) (Auto)


  1.2 %


(0.0-2.0)


 


Sodium Level


  143 MMOL/L


(136-145)


 


Potassium Level


  4.1 MMOL/L


(3.5-5.1)


 


Chloride Level


  111 MMOL/L


()  H


 


Carbon Dioxide Level


  23 MMOL/L


(21-32)


 


Anion Gap


  9 mmol/L


(5-15)


 


Blood Urea Nitrogen


  31 mg/dL


(7-18)  H


 


Creatinine


  1.7 MG/DL


(0.55-1.30)  H


 


Estimat Glomerular Filtration


Rate  mL/min (>60)  


 


 


Glucose Level


  85 MG/DL


()


 


Uric Acid


  6.5 MG/DL


(2.6-7.2)


 


Calcium Level


  8.5 MG/DL


(8.5-10.1)


 


Phosphorus Level


  3.5 MG/DL


(2.5-4.9)


 


Magnesium Level


  2.0 MG/DL


(1.8-2.4)


 


Total Bilirubin


  0.4 MG/DL


(0.2-1.0)


 


Aspartate Amino Transf


(AST/SGOT) 21 U/L (15-37)


 


 


Alanine Aminotransferase


(ALT/SGPT) 17 U/L (12-78)


 


 


Alkaline Phosphatase


  48 U/L


()


 


C-Reactive Protein,


Quantitative 1.9 mg/dL


(0.00-0.90)  H


 


Pro-B-Type Natriuretic Peptide


  737 pg/mL


(0-125)  H


 


Total Protein


  6.3 G/DL


(6.4-8.2)  L


 


Albumin


  2.8 G/DL


(3.4-5.0)  L


 


Globulin 3.5 g/dL  


 


Albumin/Globulin Ratio


  0.8 (1.0-2.7)


L

















Intake and Output  


 


 8/13/19 8/14/19





 19:00 07:00


 


Intake Total 240 ml 


 


Output Total 750 ml 


 


Balance -510 ml 


 


  


 


Intake Oral 240 ml 


 


Output Urine Total 750 ml 


 


# Voids 4 








Objective


PHYSICAL EXAMINATION:


GENERAL:  The patient is awake and responsive, in no acute


distress.


HEAD AND NECK:  Pupils are reactive to light.  Extraocular movements


intact.


NECK:  Supple.  No JVD.


LUNGS:  Good air entry.  No wheezing or rales.


HEART:  Reveals S1, S2.  Distant heart sounds.  No gallops.


ABDOMEN:  Soft, nondistended, and nontender.  Positive bowel


sounds.


EXTREMITIES:  No cyanosis, clubbing, or edema.  Right hand fourth finger


with laceration and dislocation, finger has been reduced.


RECTAL/GENITOURINARY:  Refused and deferred.


PSYCHIATRIC:  Mood and affect is intact.





Assessment/Plan


Assessment/Plan


ASSESSMENT:


1. Status post mechanical fall with the right hand fourth finger


laceration as well as dislocation status post reduction.


2. Hypertension, uncontrolled.


3. History of CVA with left hemiparesis.


4. Acute on Chronic kidney failure


5. Anemia.


6.  Homeless





PLAN:  


1.  Admit the patient to monitor unit.


2.   Dr. Tristan pulmonary consultation as well as 


3.  Dr. Davila from Infectious Disease.


4.  Follow up with 2D echo.  


5.  Code status is Full Code.  


6.  DVT prophylaxis with heparin subcutaneous.  We will follow up with the 


7.  anemia workup in progress.  We will hold aspirin at this time due to the 

patient's anemia.  Follow up


with the PT and OT evaluation. 


8.  discharge planning:  see social work note


9.  Renal=Piero Easton MD Aug 14, 2019 13:06

## 2019-08-14 NOTE — NEPHROLOGY PROGRESS NOTE
Assessment/Plan


Problem List:  


(1) Renal failure (ARF), acute on chronic


(2) History of hypertension


(3) Anemia


(4) History of CVA (cerebrovascular accident)


Assessment





Renal Failure-


? acute obn top of chronic


Anemia


HTN


h/o CVA


Rt 4th finger laceration: reason for admission


Plan





watch serum Cr on Ace I


Anemia farley bit of low folate


U Na


U Analysis


BP control, further adjustment done


Avoid Nephrotoxics


? DC planning?





Subjective


ROS Limited/Unobtainable:  No


Constitutional:  Reports: malaise





Objective


Objective





Last 24 Hour Vital Signs








  Date Time  Temp Pulse Resp B/P (MAP) Pulse Ox O2 Delivery O2 Flow Rate FiO2


 


8/14/19 12:00 98.1 57 18 158/59 (92) 97   


 


8/14/19 09:00      Room Air  





      Room Air  


 


8/14/19 08:42    164/72    


 


8/14/19 08:42  68  164/72    


 


8/14/19 08:41  68  164/72    


 


8/14/19 08:35  69 18  96 Room Air  21


 


8/14/19 08:00 98.9 68 16 164/72 (102) 97   


 


8/14/19 05:34    108/66    


 


8/14/19 03:36 98.9 68 16 148/60 (89) 97   


 


8/14/19 00:00 97.7 58 16 142/61 (88) 96   


 


8/13/19 21:26  68  108/54    


 


8/13/19 21:25    108/54    


 


8/13/19 21:00      Room Air  





      Room Air  


 


8/13/19 20:06  68 18  97 Room Air  21


 


8/13/19 20:00 97.6 63 19 108/54 (72) 95   


 


8/13/19 16:00 97.5 65 19 142/66 (91) 99   

















Intake and Output  


 


 8/13/19 8/14/19





 19:00 07:00


 


Intake Total 240 ml 


 


Output Total 750 ml 


 


Balance -510 ml 


 


  


 


Intake Oral 240 ml 


 


Output Urine Total 750 ml 


 


# Voids 4 








Laboratory Tests


8/14/19 05:26: 


White Blood Count 6.0, Red Blood Count 3.43L, Hemoglobin 9.3L, Hematocrit 29.7L

, Mean Corpuscular Volume 87, Mean Corpuscular Hemoglobin 27.2, Mean 

Corpuscular Hemoglobin Concent 31.4L, Red Cell Distribution Width 13.7, 

Platelet Count 250, Mean Platelet Volume 5.5L, Neutrophils (%) (Auto) 63.2, 

Lymphocytes (%) (Auto) 18.8L, Monocytes (%) (Auto) 10.3H, Eosinophils (%) (Auto

) 6.5H, Basophils (%) (Auto) 1.2, Sodium Level 143, Potassium Level 4.1, 

Chloride Level 111H, Carbon Dioxide Level 23, Anion Gap 9, Blood Urea Nitrogen 

31H, Creatinine 1.7H, Estimat Glomerular Filtration Rate , Glucose Level 85, 

Uric Acid 6.5, Calcium Level 8.5, Phosphorus Level 3.5, Magnesium Level 2.0, 

Total Bilirubin 0.4, Aspartate Amino Transf (AST/SGOT) 21, Alanine 

Aminotransferase (ALT/SGPT) 17, Alkaline Phosphatase 48, C-Reactive Protein, 

Quantitative 1.9H, Pro-B-Type Natriuretic Peptide 737H, Total Protein 6.3L, 

Albumin 2.8L, Globulin 3.5, Albumin/Globulin Ratio 0.8L


Height (Feet):  5


Height (Inches):  10.00


Weight (Pounds):  152


General Appearance:  no apparent distress


Objective


no change











Humberto De Jesus MD Aug 14, 2019 12:20

## 2019-08-14 NOTE — NUR
NURSE NOTES:

Patient in bed, awake, alert and verbally responsive. Able to make needs known. Respiration 
is even and unlabored. SKin is warm and dry to touch. Abdomen is soft and non distended. 
Kept clean and comfortable. No complaint of pain or discomfort at the moment. Bed in low and 
locked position. IV site noted. call light is at bedside. Will continue plan of care.

## 2019-08-14 NOTE — NUR
CASE MANAGEMENT: REVIEW



SI: PNA . 

T 97.3 HR 57 RR 18 /72 SAT 97% ROOM AIR

H/H 9.3/29.7 BUN 31 CR 



IS: HEPARIN SUBQ Q12HR

PROCARDIA PO BID

HYDRALAZINE PO Q8HR

METOPROLOL PO Q12HR 

ALBUTEROL HHN Q4HR PRN 







***MED/SURG STATUS***

DCP: PATIENT IS FROM REPORT [facility]

## 2019-08-14 NOTE — NUR
NURSE NOTES:

Patient awake, alert x3; on room air, no sign of distress and shortness of breath; no sing 
of chest pain; LAC 20G flushes well; breaks engaged, bed at lowest position, side rails up 
x2; urinal and call light within reach; will collect OB Stool and sputum as patient provides 
it.

## 2019-08-14 NOTE — CARDIOLOGY PROGRESS NOTE
Assessment/Plan


Assessment/Plan


1. Non-syncopal fall.


2. Right dorsal dislocation of PIP joint of the index finger.


3. History of CVA with carotid endarterectomy.


4. History of hypertension.











seems to be doign ok 


cr worsend on the acei will dc and use the hydralzine 





dc plan when ok with dr valencia





Subjective


Cardiovascular:  Denies: chest pain, irregular heart rate, lightheadedness


Respiratory:  Denies: shortness of breath


Gastrointestinal/Abdominal:  Denies: abdominal pain


Genitourinary:  Denies: no symptoms





Objective





Last 24 Hour Vital Signs








  Date Time  Temp Pulse Resp B/P (MAP) Pulse Ox O2 Delivery O2 Flow Rate FiO2


 


8/14/19 17:06  90  156/65    


 


8/14/19 16:00 97.3 90 18 156/65 (95) 97   


 


8/14/19 13:05    158/59    


 


8/14/19 12:00 98.1 57 18 158/59 (92) 97   


 


8/14/19 09:00      Room Air  





      Room Air  


 


8/14/19 08:42    164/72    


 


8/14/19 08:42  68  164/72    


 


8/14/19 08:41  68  164/72    


 


8/14/19 08:35  69 18  96 Room Air  21


 


8/14/19 08:00 98.9 68 16 164/72 (102) 97   


 


8/14/19 05:34    108/66    


 


8/14/19 03:36 98.9 68 16 148/60 (89) 97   


 


8/14/19 00:00 97.7 58 16 142/61 (88) 96   


 


8/13/19 21:26  68  108/54    


 


8/13/19 21:25    108/54    


 


8/13/19 21:00      Room Air  





      Room Air  


 


8/13/19 20:06  68 18  97 Room Air  21


 


8/13/19 20:00 97.6 63 19 108/54 (72) 95   








General Appearance:  no apparent distress, alert


Neck:  supple


Cardiovascular:  normal rate


Respiratory/Chest:  lungs clear


Abdomen:  normal bowel sounds, non tender, soft


Extremities:  no swelling











Intake and Output  


 


 8/13/19 8/14/19





 19:00 07:00


 


Intake Total 240 ml 


 


Output Total 750 ml 


 


Balance -510 ml 


 


  


 


Intake Oral 240 ml 


 


Output Urine Total 750 ml 


 


# Voids 4 











Laboratory Tests








Test


  8/14/19


05:26


 


White Blood Count


  6.0 K/UL


(4.8-10.8)


 


Red Blood Count


  3.43 M/UL


(4.70-6.10)  L


 


Hemoglobin


  9.3 G/DL


(14.2-18.0)  L


 


Hematocrit


  29.7 %


(42.0-52.0)  L


 


Mean Corpuscular Volume 87 FL (80-99)  


 


Mean Corpuscular Hemoglobin


  27.2 PG


(27.0-31.0)


 


Mean Corpuscular Hemoglobin


Concent 31.4 G/DL


(32.0-36.0)  L


 


Red Cell Distribution Width


  13.7 %


(11.6-14.8)


 


Platelet Count


  250 K/UL


(150-450)


 


Mean Platelet Volume


  5.5 FL


(6.5-10.1)  L


 


Neutrophils (%) (Auto)


  63.2 %


(45.0-75.0)


 


Lymphocytes (%) (Auto)


  18.8 %


(20.0-45.0)  L


 


Monocytes (%) (Auto)


  10.3 %


(1.0-10.0)  H


 


Eosinophils (%) (Auto)


  6.5 %


(0.0-3.0)  H


 


Basophils (%) (Auto)


  1.2 %


(0.0-2.0)


 


Sodium Level


  143 MMOL/L


(136-145)


 


Potassium Level


  4.1 MMOL/L


(3.5-5.1)


 


Chloride Level


  111 MMOL/L


()  H


 


Carbon Dioxide Level


  23 MMOL/L


(21-32)


 


Anion Gap


  9 mmol/L


(5-15)


 


Blood Urea Nitrogen


  31 mg/dL


(7-18)  H


 


Creatinine


  1.7 MG/DL


(0.55-1.30)  H


 


Estimat Glomerular Filtration


Rate  mL/min (>60)  


 


 


Glucose Level


  85 MG/DL


()


 


Uric Acid


  6.5 MG/DL


(2.6-7.2)


 


Calcium Level


  8.5 MG/DL


(8.5-10.1)


 


Phosphorus Level


  3.5 MG/DL


(2.5-4.9)


 


Magnesium Level


  2.0 MG/DL


(1.8-2.4)


 


Total Bilirubin


  0.4 MG/DL


(0.2-1.0)


 


Aspartate Amino Transf


(AST/SGOT) 21 U/L (15-37)


 


 


Alanine Aminotransferase


(ALT/SGPT) 17 U/L (12-78)


 


 


Alkaline Phosphatase


  48 U/L


()


 


C-Reactive Protein,


Quantitative 1.9 mg/dL


(0.00-0.90)  H


 


Pro-B-Type Natriuretic Peptide


  737 pg/mL


(0-125)  H


 


Total Protein


  6.3 G/DL


(6.4-8.2)  L


 


Albumin


  2.8 G/DL


(3.4-5.0)  L


 


Globulin 3.5 g/dL  


 


Albumin/Globulin Ratio


  0.8 (1.0-2.7)


L

















Wali Oviedo MD Aug 14, 2019 19:42

## 2019-08-14 NOTE — INFECTIOUS DISEASES PROG NOTE
Assessment/Plan


Assessment/Plan








Assessment:


Afebrile


No leukocytosis





R 4th finger pan-2ry to PIP dislocation and laceration, s/p reduction


  -repeat R hand xray: Successful reduction of the previously seen dislocation 

at the fourth proximal interphalangeal joint. No definite/displaced acute 

fracture.


  -xray R hand:  Dislocation at the fourth proximal interphalangeal joint. No 

definite/displaced associated acute fracture.Osteoarthrosis.


 


s/p Fall


  -Head CT: Exam limited by motion artifact. Within this limitations:No 

evidence of acute intracranial hemorrhage, mass effect or cortical edema. MRI 

maybe obtained for more sensitive evaluation as clinically indicated. Atrophy 

and nonspecific periventricular hypoattenuation suggestive of chronic ischemic 

microvascular changes. Likely chronic infarct in the right frontal lobe. Left 

supraorbital soft tissue swelling. No depressed skull fracture.


  -CT chest: No discrete mass.Pulmonary emphysema.Cardiomegaly.  Small 

pericardial fluid.Small left and trace right pleural effusions.  Right renal 

cyst. 


  -CT neck: No discrete mass. Pulmonary emphysema. Cervical spondylosis with 

central canal and foraminal stenosis.


 





Dyspnea


  -CXR:   Nonspecific mild interstitial prominence, possibly chronic. No 

definite focal consolidation, pleural effusion, pneumothorax or evidence of 

alveolar edema. Borderline cardiomegaly, possibly exaggerated by AP 

technique.Bilateral hilar prominence most likely related to ectatic pulmonary 

vasculature.








VIKASH, improving


   -u/a neg


   -Renal US:  Slightly increased echogenicity within the kidneys which can be 

seen in the setting of medical renal disease.  No hydronephrosis.  Cyst within 

the right renal upper pole.  No stones.  Unremarkable appearance of the 

bladder. 


 





CVA w L side weakness


HTN


 appendectomy





Plan:


-D/c prophylactic PO Keflex #6/5


    -8/9 IV Vancomycin, Ceftriaxone #1, Cefepime x1





-f/u cx


-Monitor CBC/CMP, temperatures


-wound care





Thank you for this consultation. Will continue to follow along with  you.





Discussed with RN.





Subjective


Allergies:  


Coded Allergies:  


     No Known Allergies (Unverified , 8/9/19)


Subjective


afebrile


no leukocytosis





Objective


Vital Signs





Last 24 Hour Vital Signs








  Date Time  Temp Pulse Resp B/P (MAP) Pulse Ox O2 Delivery O2 Flow Rate FiO2


 


8/14/19 13:05    158/59    


 


8/14/19 12:00 98.1 57 18 158/59 (92) 97   


 


8/14/19 09:00      Room Air  





      Room Air  


 


8/14/19 08:42    164/72    


 


8/14/19 08:42  68  164/72    


 


8/14/19 08:41  68  164/72    


 


8/14/19 08:35  69 18  96 Room Air  21


 


8/14/19 08:00 98.9 68 16 164/72 (102) 97   


 


8/14/19 05:34    108/66    


 


8/14/19 03:36 98.9 68 16 148/60 (89) 97   


 


8/14/19 00:00 97.7 58 16 142/61 (88) 96   


 


8/13/19 21:26  68  108/54    


 


8/13/19 21:25    108/54    


 


8/13/19 21:00      Room Air  





      Room Air  


 


8/13/19 20:06  68 18  97 Room Air  21


 


8/13/19 20:00 97.6 63 19 108/54 (72) 95   


 


8/13/19 16:00 97.5 65 19 142/66 (91) 99   








Height (Feet):  5


Height (Inches):  10.00


Weight (Pounds):  152


Objective





General Appearance:  thin


Lines, tubes and drains:  peripheral, PICC


HEENT:  normocephalic, atraumatic


Neck:  non-tender, supple


Respiratory/Chest:  chest wall non-tender, lungs clear


Cardiovascular/Chest:  normal peripheral pulses


Abdomen:  normal bowel sounds


Extremities:  , laceration - R 4th finger laceration





Laboratory Tests








Test


  8/14/19


05:26


 


White Blood Count


  6.0 K/UL


(4.8-10.8)


 


Red Blood Count


  3.43 M/UL


(4.70-6.10)  L


 


Hemoglobin


  9.3 G/DL


(14.2-18.0)  L


 


Hematocrit


  29.7 %


(42.0-52.0)  L


 


Mean Corpuscular Volume 87 FL (80-99)  


 


Mean Corpuscular Hemoglobin


  27.2 PG


(27.0-31.0)


 


Mean Corpuscular Hemoglobin


Concent 31.4 G/DL


(32.0-36.0)  L


 


Red Cell Distribution Width


  13.7 %


(11.6-14.8)


 


Platelet Count


  250 K/UL


(150-450)


 


Mean Platelet Volume


  5.5 FL


(6.5-10.1)  L


 


Neutrophils (%) (Auto)


  63.2 %


(45.0-75.0)


 


Lymphocytes (%) (Auto)


  18.8 %


(20.0-45.0)  L


 


Monocytes (%) (Auto)


  10.3 %


(1.0-10.0)  H


 


Eosinophils (%) (Auto)


  6.5 %


(0.0-3.0)  H


 


Basophils (%) (Auto)


  1.2 %


(0.0-2.0)


 


Sodium Level


  143 MMOL/L


(136-145)


 


Potassium Level


  4.1 MMOL/L


(3.5-5.1)


 


Chloride Level


  111 MMOL/L


()  H


 


Carbon Dioxide Level


  23 MMOL/L


(21-32)


 


Anion Gap


  9 mmol/L


(5-15)


 


Blood Urea Nitrogen


  31 mg/dL


(7-18)  H


 


Creatinine


  1.7 MG/DL


(0.55-1.30)  H


 


Estimat Glomerular Filtration


Rate  mL/min (>60)  


 


 


Glucose Level


  85 MG/DL


()


 


Uric Acid


  6.5 MG/DL


(2.6-7.2)


 


Calcium Level


  8.5 MG/DL


(8.5-10.1)


 


Phosphorus Level


  3.5 MG/DL


(2.5-4.9)


 


Magnesium Level


  2.0 MG/DL


(1.8-2.4)


 


Total Bilirubin


  0.4 MG/DL


(0.2-1.0)


 


Aspartate Amino Transf


(AST/SGOT) 21 U/L (15-37)


 


 


Alanine Aminotransferase


(ALT/SGPT) 17 U/L (12-78)


 


 


Alkaline Phosphatase


  48 U/L


()


 


C-Reactive Protein,


Quantitative 1.9 mg/dL


(0.00-0.90)  H


 


Pro-B-Type Natriuretic Peptide


  737 pg/mL


(0-125)  H


 


Total Protein


  6.3 G/DL


(6.4-8.2)  L


 


Albumin


  2.8 G/DL


(3.4-5.0)  L


 


Globulin 3.5 g/dL  


 


Albumin/Globulin Ratio


  0.8 (1.0-2.7)


L











Current Medications








 Medications


  (Trade)  Dose


 Ordered  Sig/Arlen


 Route


 PRN Reason  Start Time


 Stop Time Status Last Admin


Dose Admin


 


 Acetaminophen


  (Tylenol)  650 mg  Q4H  PRN


 ORAL


 T>100.5  8/13/19 10:14


 9/12/19 10:13   


 


 


 Albuterol/


 Ipratropium


  (Albuterol/


 Ipratropium)  3 ml  Q4H  PRN


 HHN


 Shortness of Breath  8/13/19 10:15


 8/18/19 10:14   


 


 


 Cephalexin


  (Keflex)  500 mg  EVERY 8  HOURS


 ORAL


   8/13/19 14:00


 8/16/19 21:59  8/14/19 13:04


 


 


 Clonidine HCl


  (Catapres Tab)  0.1 mg  Q4H  PRN


 ORAL


 SBP>160  8/13/19 10:14


 9/12/19 10:13   


 


 


 Dextrose


  (Dextrose 50%)  25 ml  Q30M  PRN


 IV


 Hypoglycemia  8/13/19 10:30


 9/8/19 10:59   


 


 


 Dextrose


  (Dextrose 50%)  50 ml  Q30M  PRN


 IV


 Hypoglycemia  8/13/19 10:30


 9/8/19 10:59   


 


 


 Folic Acid


  (Folate)  2 mg  DAILY


 ORAL


   8/14/19 09:00


 9/9/19 09:14  8/14/19 08:42


 


 


 Heparin Sodium


  (Porcine)


  (Heparin 5000


 units/ml)  5,000 units  EVERY 12  HOURS


 SUBQ


   8/13/19 21:00


 9/8/19 10:59  8/14/19 08:43


 


 


 Hydralazine HCl


  (Apresoline)  75 mg  Q8HR


 ORAL


   8/14/19 14:00


 9/8/19 17:59  8/14/19 13:05


 


 


 Lisinopril


  (Zestril)  10 mg  DAILY


 ORAL


   8/14/19 09:00


 9/12/19 08:59  8/14/19 08:42


 


 


 Metoprolol


 Tartrate


  (Lopressor)  12.5 mg  Q12HR


 ORAL


   8/13/19 21:00


 9/9/19 09:14  8/14/19 08:41


 


 


 Morphine Sulfate


  (Morphine


 Sulfate)  2 mg  Q4H  PRN


 IVP


 Severe Pain (Pain Scale 7-10)  8/13/19 10:15


 8/20/19 10:14   


 


 


 Nifedipine


  (Procardia XL)  30 mg  BID


 ORAL


   8/14/19 18:00


 9/13/19 17:59   


 


 


 Ondansetron HCl


  (Zofran)  4 mg  Q6H  PRN


 IVP


 Nausea & Vomiting  8/13/19 11:00


 9/8/19 10:59   


 


 


 Polyethylene


 Glycol


  (Miralax)  17 gm  DAILYPRN  PRN


 ORAL


 Constipation  8/13/19 10:15


 9/12/19 10:14   


 

















Alexa Davila M.D. Aug 14, 2019 13:23

## 2019-08-15 NOTE — INTERNAL MED PROGRESS NOTE
Subjective


Physician Name


Frandy Vuong


Attending Physician


Frandy Vuong MD





Current Medications








 Medications


  (Trade)  Dose


 Ordered  Sig/Arlen


 Route


 PRN Reason  Start Time


 Stop Time Status Last Admin


Dose Admin


 


 Acetaminophen


  (Tylenol)  650 mg  Q4H  PRN


 ORAL


 T>100.5  8/13/19 10:14


 9/12/19 10:13   


 


 


 Albuterol/


 Ipratropium


  (Albuterol/


 Ipratropium)  3 ml  Q4H  PRN


 HHN


 Shortness of Breath  8/13/19 10:15


 8/18/19 10:14   


 


 


 Clonidine HCl


  (Catapres Tab)  0.1 mg  Q4H  PRN


 ORAL


 SBP>160  8/13/19 10:14


 9/12/19 10:13   


 


 


 Dextrose


  (Dextrose 50%)  25 ml  Q30M  PRN


 IV


 Hypoglycemia  8/13/19 10:30


 9/8/19 10:59   


 


 


 Dextrose


  (Dextrose 50%)  50 ml  Q30M  PRN


 IV


 Hypoglycemia  8/13/19 10:30


 9/8/19 10:59   


 


 


 Folic Acid


  (Folate)  2 mg  DAILY


 ORAL


   8/14/19 09:00


 9/9/19 09:14  8/15/19 08:07


 


 


 Heparin Sodium


  (Porcine)


  (Heparin 5000


 units/ml)  5,000 units  EVERY 12  HOURS


 SUBQ


   8/13/19 21:00


 9/8/19 10:59  8/15/19 21:04


 


 


 Hydralazine HCl


  (Apresoline)  75 mg  Q8HR


 ORAL


   8/14/19 14:00


 9/8/19 17:59  8/15/19 21:06


 


 


 Metoprolol


 Tartrate


  (Lopressor)  12.5 mg  Q12HR


 ORAL


   8/13/19 21:00


 9/9/19 09:14  8/15/19 21:07


 


 


 Morphine Sulfate


  (Morphine


 Sulfate)  2 mg  Q4H  PRN


 IVP


 Severe Pain (Pain Scale 7-10)  8/13/19 10:15


 8/20/19 10:14   


 


 


 Nifedipine


  (Procardia XL)  30 mg  BID


 ORAL


   8/14/19 18:00


 9/13/19 17:59  8/15/19 17:16


 


 


 Ondansetron HCl


  (Zofran)  4 mg  Q6H  PRN


 IVP


 Nausea & Vomiting  8/13/19 11:00


 9/8/19 10:59   


 


 


 Polyethylene


 Glycol


  (Miralax)  17 gm  DAILYPRN  PRN


 ORAL


 Constipation  8/13/19 10:15


 9/12/19 10:14   


 








Allergies:  


Coded Allergies:  


     No Known Allergies (Unverified , 8/9/19)


Subjective


Awake, alert, responsive, denies any shortness of breath or chest pain.





Objective





Last Vital Signs








  Date Time  Temp Pulse Resp B/P (MAP) Pulse Ox O2 Delivery O2 Flow Rate FiO2


 


8/15/19 21:07  72  153/62    


 


8/15/19 20:28   20  94 Nasal Cannula 2.0 28


 


8/15/19 16:00 98.9       

















Intake and Output  


 


 8/14/19 8/15/19





 19:00 07:00


 


Output Total 400 ml 


 


Balance -400 ml 


 


  


 


Output Urine Total 400 ml 


 


# Voids 5 








Objective


GENERAL:  The patient is awake and responsive, in no acute distress.


HEAD AND NECK:  Pupils are reactive to light.  Extraocular movements intact.


NECK:  Supple.  No JVD.


LUNGS:  Good air entry.  No wheezing or rales.


HEART:  Reveals S1, S2.  Distant heart sounds.  No gallops.


ABDOMEN:  Soft, nondistended, and nontender.  Positive bowel sounds.


EXTREMITIES:  No cyanosis, clubbing, or edema.  Right hand fourth finger with 

laceration and dislocation, finger has been reduced.


RECTAL/GENITOURINARY:  Refused and deferred.


PSYCHIATRIC:  Mood and affect is intact.





Assessment/Plan


Assessment/Plan


1. Status post mechanical fall with the right hand fourth finger laceration as 

well as dislocation status post reduction.


2. Hypertension, uncontrolled.


3. History of CVA with left hemiparesis.


4. Acute on Chronic kidney failure


5. Anemia.


6.  Homeless





PLAN:  


1.  in Medical unit.


2.  Dr. Tristan pulmonary consultation as well as 


3.  Dr. Davila from Infectious Disease.


4.  Dr. Fouladin from Nephrology. 


5.  Code status is Full Code.  


6.  DVT prophylaxis with heparin subcutaneous.  


7.  Discharge planning: Patient declined SNF placement.


8.  Will monitor off antibiotics.











Frandy Vuong MD Aug 15, 2019 23:24

## 2019-08-15 NOTE — PULMONOLOGY PROGRESS NOTE
Assessment/Plan


Problems:  


(1) Pneumonia


(2) History of hypertension


(3) History of CVA (cerebrovascular accident)


Assessment/Plan


all reviewed


no new complains


doing better


respiratory treatment


check electrolytes


dvt prophylaxis


no new complains





needs placement





Subjective


ROS Limited/Unobtainable:  No


Constitutional:  Reports: no symptoms


HEENT:  Repors: no symptoms


Respiratory:  Reports: no symptoms


Allergies:  


Coded Allergies:  


     No Known Allergies (Unverified , 8/9/19)





Objective





Last 24 Hour Vital Signs








  Date Time  Temp Pulse Resp B/P (MAP) Pulse Ox O2 Delivery O2 Flow Rate FiO2


 


8/15/19 12:00 98.8 66 18 121/63 (82) 98   


 


8/15/19 09:50  62 20  97 Room Air  21


 


8/15/19 09:00      Room Air  





      Room Air  


 


8/15/19 08:09  71  127/55    


 


8/15/19 08:08  71  127/55    


 


8/15/19 08:00 98.9 71 19 127/55 (79) 98   


 


8/15/19 05:29    157/61    


 


8/15/19 04:00 97.9 61 20 157/61 (93) 98   


 


8/15/19 00:00 97.5 63 20 149/64 (92) 97   


 


8/14/19 21:12    144/63    


 


8/14/19 21:11  65  144/63    


 


8/14/19 21:00      Room Air  





      Room Air  


 


8/14/19 21:00  65 18  95 Room Air  21


 


8/14/19 20:00 97.7 65 20 144/63 (90) 98   


 


8/14/19 17:06  90  156/65    


 


8/14/19 16:00 97.3 90 18 156/65 (95) 97   

















Intake and Output  


 


 8/14/19 8/15/19





 19:00 07:00


 


Output Total 400 ml 


 


Balance -400 ml 


 


  


 


Output Urine Total 400 ml 


 


# Voids 5 








General Appearance:  WD/WN


HEENT:  atraumatic, anicteric


Respiratory/Chest:  chest wall non-tender, lungs clear


Cardiovascular:  normal peripheral pulses, regular rhythm


Abdomen:  normal bowel sounds, no organomegaly


Extremities:  no cyanosis


Skin:  no rash





Current Medications








 Medications


  (Trade)  Dose


 Ordered  Sig/Arlen


 Route


 PRN Reason  Start Time


 Stop Time Status Last Admin


Dose Admin


 


 Acetaminophen


  (Tylenol)  650 mg  Q4H  PRN


 ORAL


 T>100.5  8/13/19 10:14


 9/12/19 10:13   


 


 


 Albuterol/


 Ipratropium


  (Albuterol/


 Ipratropium)  3 ml  Q4H  PRN


 HHN


 Shortness of Breath  8/13/19 10:15


 8/18/19 10:14   


 


 


 Clonidine HCl


  (Catapres Tab)  0.1 mg  Q4H  PRN


 ORAL


 SBP>160  8/13/19 10:14


 9/12/19 10:13   


 


 


 Dextrose


  (Dextrose 50%)  25 ml  Q30M  PRN


 IV


 Hypoglycemia  8/13/19 10:30


 9/8/19 10:59   


 


 


 Dextrose


  (Dextrose 50%)  50 ml  Q30M  PRN


 IV


 Hypoglycemia  8/13/19 10:30


 9/8/19 10:59   


 


 


 Folic Acid


  (Folate)  2 mg  DAILY


 ORAL


   8/14/19 09:00


 9/9/19 09:14  8/15/19 08:07


 


 


 Heparin Sodium


  (Porcine)


  (Heparin 5000


 units/ml)  5,000 units  EVERY 12  HOURS


 SUBQ


   8/13/19 21:00


 9/8/19 10:59  8/15/19 08:09


 


 


 Hydralazine HCl


  (Apresoline)  75 mg  Q8HR


 ORAL


   8/14/19 14:00


 9/8/19 17:59  8/15/19 05:29


 


 


 Metoprolol


 Tartrate


  (Lopressor)  12.5 mg  Q12HR


 ORAL


   8/13/19 21:00


 9/9/19 09:14  8/15/19 08:09


 


 


 Morphine Sulfate


  (Morphine


 Sulfate)  2 mg  Q4H  PRN


 IVP


 Severe Pain (Pain Scale 7-10)  8/13/19 10:15


 8/20/19 10:14   


 


 


 Nifedipine


  (Procardia XL)  30 mg  BID


 ORAL


   8/14/19 18:00


 9/13/19 17:59  8/15/19 08:08


 


 


 Ondansetron HCl


  (Zofran)  4 mg  Q6H  PRN


 IVP


 Nausea & Vomiting  8/13/19 11:00


 9/8/19 10:59   


 


 


 Polyethylene


 Glycol


  (Miralax)  17 gm  DAILYPRN  PRN


 ORAL


 Constipation  8/13/19 10:15


 9/12/19 10:14   


 

















Kolton Tristan MD Aug 15, 2019 13:26

## 2019-08-15 NOTE — NUR
NURSE NOTES:

Patient alert x3, hard of hearing; on room air, no sing of distress and shortness of breath; 
no sing of chest pain; IV LAC flushes well; urinal and call light within reach; bed at 
lowest position, side rails up x2, breaks engaged; will care out plan of care.

## 2019-08-15 NOTE — NUR
NURSE NOTES:

Received report from LILIANA Ward. Patient awake and alert. On room air, no signs of distress 
or labored breathing. IV intact, patent, and saline locked. Right ring finger has splint and 
right hand wrapped in kerlix. Bed in lowest position with call light in reach. Will continue 
to monitor.

## 2019-08-15 NOTE — NUR
SS note

Chart reviewed. (this SW discussed with patient plans for discharge). Patient getting angry, 
stating "you are all just greedy, you just want my money." This SW advised patient the 
requirement for Transitional Housing or Board and Care. Hiland addition, patient explains he 
will not be paying for this, does not want any further assistance for placement. Patient has 
been requiring assistance from nursing, with P.T notes showing patient is stand by assist 
with walker. Nursing also reports patient is not independent with ADLs, ambulation and 
requires SNF at this time.

## 2019-08-15 NOTE — INFECTIOUS DISEASES PROG NOTE
Assessment/Plan


Assessment/Plan








Assessment:


Afebrile


No leukocytosis





R 4th finger pan-2ry to PIP dislocation and laceration, s/p reduction


  -repeat R hand xray: Successful reduction of the previously seen dislocation 

at the fourth proximal interphalangeal joint. No definite/displaced acute 

fracture.


  -xray R hand:  Dislocation at the fourth proximal interphalangeal joint. No 

definite/displaced associated acute fracture.Osteoarthrosis.


 


s/p Fall


  -Head CT: Exam limited by motion artifact. Within this limitations:No 

evidence of acute intracranial hemorrhage, mass effect or cortical edema. MRI 

maybe obtained for more sensitive evaluation as clinically indicated. Atrophy 

and nonspecific periventricular hypoattenuation suggestive of chronic ischemic 

microvascular changes. Likely chronic infarct in the right frontal lobe. Left 

supraorbital soft tissue swelling. No depressed skull fracture.


  -CT chest: No discrete mass.Pulmonary emphysema.Cardiomegaly.  Small 

pericardial fluid.Small left and trace right pleural effusions.  Right renal 

cyst. 


  -CT neck: No discrete mass. Pulmonary emphysema. Cervical spondylosis with 

central canal and foraminal stenosis.


 





Dyspnea


  -CXR:   Nonspecific mild interstitial prominence, possibly chronic. No 

definite focal consolidation, pleural effusion, pneumothorax or evidence of 

alveolar edema. Borderline cardiomegaly, possibly exaggerated by AP 

technique.Bilateral hilar prominence most likely related to ectatic pulmonary 

vasculature.








VIKASH, improving


   -u/a neg


   -Renal US:  Slightly increased echogenicity within the kidneys which can be 

seen in the setting of medical renal disease.  No hydronephrosis.  Cyst within 

the right renal upper pole.  No stones.  Unremarkable appearance of the 

bladder. 


 





CVA w L side weakness


HTN


 appendectomy





Plan:


-Continue to monitor off abx


    -8/14 SP keflex #6


    -8/9 IV Vancomycin, Ceftriaxone #1, Cefepime x1





-f/u cx


-Monitor CBC/CMP, temperatures


-wound care





Thank you for this consultation. Will continue to follow along with  you.





Discussed with RN.





Subjective


Allergies:  


Coded Allergies:  


     No Known Allergies (Unverified , 8/9/19)


Subjective


afebrile


no leukocytosis





Objective


Vital Signs





Last 24 Hour Vital Signs








  Date Time  Temp Pulse Resp B/P (MAP) Pulse Ox O2 Delivery O2 Flow Rate FiO2


 


8/15/19 09:50  62 20  97 Room Air  21


 


8/15/19 09:00      Room Air  





      Room Air  


 


8/15/19 08:09  71  127/55    


 


8/15/19 08:08  71  127/55    


 


8/15/19 08:00 98.9 71 19 127/55 (79) 98   


 


8/15/19 05:29    157/61    


 


8/15/19 04:00 97.9 61 20 157/61 (93) 98   


 


8/15/19 00:00 97.5 63 20 149/64 (92) 97   


 


8/14/19 21:12    144/63    


 


8/14/19 21:11  65  144/63    


 


8/14/19 21:00      Room Air  





      Room Air  


 


8/14/19 21:00  65 18  95 Room Air  21


 


8/14/19 20:00 97.7 65 20 144/63 (90) 98   


 


8/14/19 17:06  90  156/65    


 


8/14/19 16:00 97.3 90 18 156/65 (95) 97   


 


8/14/19 13:05    158/59    


 


8/14/19 12:00 98.1 57 18 158/59 (92) 97   








Height (Feet):  5


Height (Inches):  10.00


Weight (Pounds):  152


Objective





General Appearance:  thin


Lines, tubes and drains:  peripheral, PICC


HEENT:  normocephalic, atraumatic


Neck:  non-tender, supple


Respiratory/Chest:  chest wall non-tender, lungs clear


Cardiovascular/Chest:  normal peripheral pulses


Abdomen:  normal bowel sounds


Extremities:  , laceration - R 4th finger laceration





Current Medications








 Medications


  (Trade)  Dose


 Ordered  Sig/Arlen


 Route


 PRN Reason  Start Time


 Stop Time Status Last Admin


Dose Admin


 


 Acetaminophen


  (Tylenol)  650 mg  Q4H  PRN


 ORAL


 T>100.5  8/13/19 10:14


 9/12/19 10:13   


 


 


 Albuterol/


 Ipratropium


  (Albuterol/


 Ipratropium)  3 ml  Q4H  PRN


 HHN


 Shortness of Breath  8/13/19 10:15


 8/18/19 10:14   


 


 


 Clonidine HCl


  (Catapres Tab)  0.1 mg  Q4H  PRN


 ORAL


 SBP>160  8/13/19 10:14


 9/12/19 10:13   


 


 


 Dextrose


  (Dextrose 50%)  25 ml  Q30M  PRN


 IV


 Hypoglycemia  8/13/19 10:30


 9/8/19 10:59   


 


 


 Dextrose


  (Dextrose 50%)  50 ml  Q30M  PRN


 IV


 Hypoglycemia  8/13/19 10:30


 9/8/19 10:59   


 


 


 Folic Acid


  (Folate)  2 mg  DAILY


 ORAL


   8/14/19 09:00


 9/9/19 09:14  8/15/19 08:07


 


 


 Heparin Sodium


  (Porcine)


  (Heparin 5000


 units/ml)  5,000 units  EVERY 12  HOURS


 SUBQ


   8/13/19 21:00


 9/8/19 10:59  8/15/19 08:09


 


 


 Hydralazine HCl


  (Apresoline)  75 mg  Q8HR


 ORAL


   8/14/19 14:00


 9/8/19 17:59  8/15/19 05:29


 


 


 Metoprolol


 Tartrate


  (Lopressor)  12.5 mg  Q12HR


 ORAL


   8/13/19 21:00


 9/9/19 09:14  8/15/19 08:09


 


 


 Morphine Sulfate


  (Morphine


 Sulfate)  2 mg  Q4H  PRN


 IVP


 Severe Pain (Pain Scale 7-10)  8/13/19 10:15


 8/20/19 10:14   


 


 


 Nifedipine


  (Procardia XL)  30 mg  BID


 ORAL


   8/14/19 18:00


 9/13/19 17:59  8/15/19 08:08


 


 


 Ondansetron HCl


  (Zofran)  4 mg  Q6H  PRN


 IVP


 Nausea & Vomiting  8/13/19 11:00


 9/8/19 10:59   


 


 


 Polyethylene


 Glycol


  (Miralax)  17 gm  DAILYPRN  PRN


 ORAL


 Constipation  8/13/19 10:15


 9/12/19 10:14   


 

















Alexa Davila M.D. Aug 15, 2019 11:43

## 2019-08-15 NOTE — NEPHROLOGY PROGRESS NOTE
Assessment/Plan


Problem List:  


(1) Renal failure (ARF), acute on chronic


(2) History of hypertension


(3) Anemia


(4) History of CVA (cerebrovascular accident)


Assessment





Renal Failure-


? acute obn top of chronic


Anemia


HTN


h/o CVA


Rt 4th finger laceration: reason for admission


Plan





watch serum Cr on Ace I


Anemia farley bit of low folate


U Na


U Analysis


BP control, further adjustment done


Avoid Nephrotoxics


? DC planning?





Subjective


ROS Limited/Unobtainable:  No


Constitutional:  Reports: malaise





Objective


Objective





Last 24 Hour Vital Signs








  Date Time  Temp Pulse Resp B/P (MAP) Pulse Ox O2 Delivery O2 Flow Rate FiO2


 


8/15/19 13:42    121/63    


 


8/15/19 12:00 98.8 66 18 121/63 (82) 98   


 


8/15/19 09:50  62 20  97 Room Air  21


 


8/15/19 09:00      Room Air  





      Room Air  


 


8/15/19 08:09  71  127/55    


 


8/15/19 08:08  71  127/55    


 


8/15/19 08:00 98.9 71 19 127/55 (79) 98   


 


8/15/19 05:29    157/61    


 


8/15/19 04:00 97.9 61 20 157/61 (93) 98   


 


8/15/19 00:00 97.5 63 20 149/64 (92) 97   


 


8/14/19 21:12    144/63    


 


8/14/19 21:11  65  144/63    


 


8/14/19 21:00      Room Air  





      Room Air  


 


8/14/19 21:00  65 18  95 Room Air  21


 


8/14/19 20:00 97.7 65 20 144/63 (90) 98   


 


8/14/19 17:06  90  156/65    


 


8/14/19 16:00 97.3 90 18 156/65 (95) 97   

















Intake and Output  


 


 8/14/19 8/15/19





 19:00 07:00


 


Output Total 400 ml 


 


Balance -400 ml 


 


  


 


Output Urine Total 400 ml 


 


# Voids 5 








Height (Feet):  5


Height (Inches):  10.00


Weight (Pounds):  152


General Appearance:  no apparent distress


Cardiovascular:  normal rate


Respiratory/Chest:  lungs clear


Abdomen:  soft


Objective


no change











Humberto De Jesus MD Aug 15, 2019 14:21

## 2019-08-16 NOTE — NUR
SS note

This SW spoke with [facility], [facility] () explains they do not 
have any openings today, but will evaluate patient and place a waiting list.  SW to follow.

## 2019-08-16 NOTE — NUR
NURSE NOTES:

Ambulance here to  pt. I called [facility] and spoke with SARAHI Parikh Nurse 
and Thi Charge Nurse and was told they don't have the pt's name on their list of 
admissions and that bed 10B is taken. They said to call tomorrow at 9am and speak with the 
Manager of the Day about admitting patient. Ambulance has left.

## 2019-08-16 NOTE — NUR
NURSE NOTES:

Received patient from LILIANA Rojas in bed resting. Patient is on RA. No signs of pain and 
distress noted. IV intact and patent, fluids  running. Patient is alert and oriented x4. Bed 
in lowest position, brakes engaged for safety. Call light within reach. All needs attended 
to. Will continue with the plan of care.

## 2019-08-16 NOTE — NEPHROLOGY PROGRESS NOTE
Assessment/Plan


Problem List:  


(1) Renal failure (ARF), acute on chronic


(2) History of hypertension


(3) Anemia


(4) History of CVA (cerebrovascular accident)


Assessment





Renal Failure-


? acute obn top of chronic


Anemia


HTN


h/o CVA


Rt 4th finger laceration: reason for admission


Plan





watch serum Cr on Ace I


Anemia farley bit of low folate


U Na


U Analysis


BP control, further adjustment done


Avoid Nephrotoxics


? DC planning?





Subjective


ROS Limited/Unobtainable:  No


Constitutional:  Reports: malaise





Objective


Objective





Last 24 Hour Vital Signs








  Date Time  Temp Pulse Resp B/P (MAP) Pulse Ox O2 Delivery O2 Flow Rate FiO2


 


8/16/19 09:16  72  147/75    


 


8/16/19 09:15  72  147/75    


 


8/16/19 09:00 97.9 72 18 147/75 (99) 98   


 


8/16/19 09:00      Room Air  





      Room Air  


 


8/16/19 07:26  65 18  96 Room Air  21


 


8/16/19 07:26     96 Room Air  21


 


8/16/19 06:42  66  152/57 (88)    


 


8/16/19 06:42    152/57    


 


8/16/19 00:00 98.0 73 17 147/59 (88) 96   


 


8/15/19 21:07  72  153/62    


 


8/15/19 21:06    153/62    


 


8/15/19 21:00      Room Air  





      Room Air  


 


8/15/19 20:28  72 20  94 Nasal Cannula 2.0 28


 


8/15/19 20:28     94 Nasal Cannula 2.0 28


 


8/15/19 20:00 97.8 72 17 153/62 (92) 98   


 


8/15/19 17:16  65  141/59    


 


8/15/19 16:00 98.9 65 16 141/59 (86) 96   


 


8/15/19 13:42    121/63    


 


8/15/19 12:00 98.8 66 18 121/63 (82) 98   

















Intake and Output  


 


 8/15/19 8/16/19





 19:00 07:00


 


Intake Total 800 ml 


 


Output Total 1350 ml 600 ml


 


Balance -550 ml -600 ml


 


  


 


Intake Oral 800 ml 


 


Output Urine Total 1350 ml 600 ml








Height (Feet):  5


Height (Inches):  10.00


Weight (Pounds):  152


General Appearance:  no apparent distress


Objective


no change











Humberto De Jesus MD Aug 16, 2019 10:47

## 2019-08-16 NOTE — NUR
SS note

Bell from [facility] explains patient does not meet criteria due to patient 
requires assistance with ADLs at this time.  Pending open bed at this time at [facility]; documentation will need to be made showing patient remains independent with all ADLS. 
Nursing informed who explains patient is independent with ADLs, should be safe for discharge 
to [facility] when bed available.

## 2019-08-16 NOTE — PULMONOLOGY PROGRESS NOTE
Assessment/Plan


Assessment/Plan


ASSESSMENT


Status post mechanical fall with right fourth finger laceration and dislocation

, status post reduction


Emphysema


Multiply pulmonary nodules


CVA with left-sided hemiparesis


Hypertension out-of-control


Chronic kidney disease


Aortic stenosis


Anemia








PLAN OF CARE


MS floor


fall precaution


pain management


PT Rx 


O2 HHN as needed


fup with  CT chest in 1 year,  given multiple pulmonary nodules


DVT prophylaxis


ECHO  with pEF 60% and evidence of aortic stenosis


BP management with multiple antihypertensive meds 


aspirin on hold , given anemia 


CT of the head revealed chronic infarct right frontal lobe 


renal ultrasound revealed medical renal disease


avoid nephrotoxic , correct electrolytes as needed , monitor renal parameters 


supportive care    





case discussed and evaluated by supervising physician





Subjective


Allergies:  


Coded Allergies:  


     No Known Allergies (Unverified , 8/9/19)


Subjective


no signs of resp distress, no cough, 


remains afebrile working with PT





Objective





Last 24 Hour Vital Signs








  Date Time  Temp Pulse Resp B/P (MAP) Pulse Ox O2 Delivery O2 Flow Rate FiO2


 


8/16/19 07:26  65 18  96 Room Air  21


 


8/16/19 07:26     96 Room Air  21


 


8/16/19 06:42  66  152/57 (88)    


 


8/16/19 06:42    152/57    


 


8/16/19 00:00 98.0 73 17 147/59 (88) 96   


 


8/15/19 21:07  72  153/62    


 


8/15/19 21:06    153/62    


 


8/15/19 21:00      Room Air  





      Room Air  


 


8/15/19 20:28  72 20  94 Nasal Cannula 2.0 28


 


8/15/19 20:28     94 Nasal Cannula 2.0 28


 


8/15/19 20:00 97.8 72 17 153/62 (92) 98   


 


8/15/19 17:16  65  141/59    


 


8/15/19 16:00 98.9 65 16 141/59 (86) 96   


 


8/15/19 13:42    121/63    


 


8/15/19 12:00 98.8 66 18 121/63 (82) 98   


 


8/15/19 09:50  62 20  97 Room Air  21


 


8/15/19 09:00      Room Air  





      Room Air  

















Intake and Output  


 


 8/15/19 8/16/19





 18:59 06:59


 


Intake Total 800 ml 


 


Output Total 1350 ml 600 ml


 


Balance -550 ml -600 ml


 


  


 


Intake Oral 800 ml 


 


Output Urine Total 1350 ml 600 ml








General Appearance:  no acute distress


HEENT:  normocephalic, atraumatic, anicteric, mucous membranes moist


Respiratory/Chest:  lungs clear, no respiratory distress, no accessory muscle 

use


Cardiovascular:  normal rate


Abdomen:  normal bowel sounds, soft, non tender


Extremities:  no edema


Neurologic/Psychiatric:  alert, oriented x 3, responsive, other - left 

hemiparesis


Musculoskeletal:  atrophy - BLE





Current Medications








 Medications


  (Trade)  Dose


 Ordered  Sig/Arlen


 Route


 PRN Reason  Start Time


 Stop Time Status Last Admin


Dose Admin


 


 Acetaminophen


  (Tylenol)  650 mg  Q4H  PRN


 ORAL


 T>100.5  8/13/19 10:14


 9/12/19 10:13   


 


 


 Albuterol/


 Ipratropium


  (Albuterol/


 Ipratropium)  3 ml  Q4H  PRN


 HHN


 Shortness of Breath  8/13/19 10:15


 8/18/19 10:14   


 


 


 Clonidine HCl


  (Catapres Tab)  0.1 mg  Q4H  PRN


 ORAL


 SBP>160  8/13/19 10:14


 9/12/19 10:13   


 


 


 Dextrose


  (Dextrose 50%)  25 ml  Q30M  PRN


 IV


 Hypoglycemia  8/13/19 10:30


 9/8/19 10:59   


 


 


 Dextrose


  (Dextrose 50%)  50 ml  Q30M  PRN


 IV


 Hypoglycemia  8/13/19 10:30


 9/8/19 10:59   


 


 


 Folic Acid


  (Folate)  2 mg  DAILY


 ORAL


   8/14/19 09:00


 9/9/19 09:14  8/15/19 08:07


 


 


 Heparin Sodium


  (Porcine)


  (Heparin 5000


 units/ml)  5,000 units  EVERY 12  HOURS


 SUBQ


   8/13/19 21:00


 9/8/19 10:59  8/15/19 21:04


 


 


 Hydralazine HCl


  (Apresoline)  75 mg  Q8HR


 ORAL


   8/14/19 14:00


 9/8/19 17:59  8/16/19 06:42


 


 


 Metoprolol


 Tartrate


  (Lopressor)  12.5 mg  Q12HR


 ORAL


   8/13/19 21:00


 9/9/19 09:14  8/15/19 21:07


 


 


 Morphine Sulfate


  (Morphine


 Sulfate)  2 mg  Q4H  PRN


 IVP


 Severe Pain (Pain Scale 7-10)  8/13/19 10:15


 8/20/19 10:14   


 


 


 Nifedipine


  (Procardia XL)  30 mg  BID


 ORAL


   8/14/19 18:00


 9/13/19 17:59  8/15/19 17:16


 


 


 Ondansetron HCl


  (Zofran)  4 mg  Q6H  PRN


 IVP


 Nausea & Vomiting  8/13/19 11:00


 9/8/19 10:59   


 


 


 Polyethylene


 Glycol


  (Miralax)  17 gm  DAILYPRN  PRN


 ORAL


 Constipation  8/13/19 10:15


 9/12/19 10:14   


 

















Denise Gonzales NP Aug 16, 2019 08:22

## 2019-08-16 NOTE — INFECTIOUS DISEASES PROG NOTE
Assessment/Plan


Assessment/Plan








Assessment:


Afebrile


No leukocytosis





R 4th finger pan-2ry to PIP dislocation and laceration, s/p reduction


  -repeat R hand xray: Successful reduction of the previously seen dislocation 

at the fourth proximal interphalangeal joint. No definite/displaced acute 

fracture.


  -xray R hand:  Dislocation at the fourth proximal interphalangeal joint. No 

definite/displaced associated acute fracture.Osteoarthrosis.


 


s/p Fall


  -Head CT: Exam limited by motion artifact. Within this limitations:No 

evidence of acute intracranial hemorrhage, mass effect or cortical edema. MRI 

maybe obtained for more sensitive evaluation as clinically indicated. Atrophy 

and nonspecific periventricular hypoattenuation suggestive of chronic ischemic 

microvascular changes. Likely chronic infarct in the right frontal lobe. Left 

supraorbital soft tissue swelling. No depressed skull fracture.


  -CT chest: No discrete mass.Pulmonary emphysema.Cardiomegaly.  Small 

pericardial fluid.Small left and trace right pleural effusions.  Right renal 

cyst. 


  -CT neck: No discrete mass. Pulmonary emphysema. Cervical spondylosis with 

central canal and foraminal stenosis.


 





Dyspnea


  -CXR:   Nonspecific mild interstitial prominence, possibly chronic. No 

definite focal consolidation, pleural effusion, pneumothorax or evidence of 

alveolar edema. Borderline cardiomegaly, possibly exaggerated by AP 

technique.Bilateral hilar prominence most likely related to ectatic pulmonary 

vasculature.








VIKASH, improving


   -u/a neg


   -Renal US:  Slightly increased echogenicity within the kidneys which can be 

seen in the setting of medical renal disease.  No hydronephrosis.  Cyst within 

the right renal upper pole.  No stones.  Unremarkable appearance of the 

bladder. 


 





CVA w L side weakness


HTN


 appendectomy





Plan:


-Continue to monitor off abx


    -8/14 SP keflex #6


    -8/9 IV Vancomycin, Ceftriaxone #1, Cefepime x1





-f/u cx


-Monitor CBC/CMP, temperatures


-wound care





Thank you for this consultation. Will continue to follow along with  you.





Discussed with RN.





Subjective


Allergies:  


Coded Allergies:  


     No Known Allergies (Unverified , 8/9/19)


Subjective


afebrile


no leukocytosis





Objective


Vital Signs





Last 24 Hour Vital Signs








  Date Time  Temp Pulse Resp B/P (MAP) Pulse Ox O2 Delivery O2 Flow Rate FiO2


 


8/16/19 13:52    155/73    


 


8/16/19 12:00 97.7 66 18 155/73 (100) 97   


 


8/16/19 09:16  72  147/75    


 


8/16/19 09:15  72  147/75    


 


8/16/19 09:00 97.9 72 18 147/75 (99) 98   


 


8/16/19 09:00      Room Air  





      Room Air  


 


8/16/19 07:26  65 18  96 Room Air  21


 


8/16/19 07:26     96 Room Air  21


 


8/16/19 06:42  66  152/57 (88)    


 


8/16/19 06:42    152/57    


 


8/16/19 00:00 98.0 73 17 147/59 (88) 96   


 


8/15/19 21:07  72  153/62    


 


8/15/19 21:06    153/62    


 


8/15/19 21:00      Room Air  





      Room Air  


 


8/15/19 20:28  72 20  94 Nasal Cannula 2.0 28


 


8/15/19 20:28     94 Nasal Cannula 2.0 28


 


8/15/19 20:00 97.8 72 17 153/62 (92) 98   


 


8/15/19 17:16  65  141/59    


 


8/15/19 16:00 98.9 65 16 141/59 (86) 96   








Height (Feet):  5


Height (Inches):  10.00


Weight (Pounds):  152


Objective





General Appearance:  thin


Lines, tubes and drains:  peripheral, PICC


HEENT:  normocephalic, atraumatic


Neck:  non-tender, supple


Respiratory/Chest:  chest wall non-tender, lungs clear


Cardiovascular/Chest:  normal peripheral pulses


Abdomen:  normal bowel sounds


Extremities:  , laceration - R 4th finger laceration





Current Medications








 Medications


  (Trade)  Dose


 Ordered  Sig/Arlen


 Route


 PRN Reason  Start Time


 Stop Time Status Last Admin


Dose Admin


 


 Acetaminophen


  (Tylenol)  650 mg  Q4H  PRN


 ORAL


 T>100.5  8/13/19 10:14


 9/12/19 10:13   


 


 


 Albuterol/


 Ipratropium


  (Albuterol/


 Ipratropium)  3 ml  Q4H  PRN


 HHN


 Shortness of Breath  8/16/19 10:38


 8/21/19 10:37   


 


 


 Clonidine HCl


  (Catapres Tab)  0.1 mg  Q4H  PRN


 ORAL


 SBP>160  8/13/19 10:14


 9/12/19 10:13   


 


 


 Dextrose


  (Dextrose 50%)  25 ml  Q30M  PRN


 IV


 Hypoglycemia  8/13/19 10:30


 9/8/19 10:59   


 


 


 Dextrose


  (Dextrose 50%)  50 ml  Q30M  PRN


 IV


 Hypoglycemia  8/13/19 10:30


 9/8/19 10:59   


 


 


 Folic Acid


  (Folate)  2 mg  DAILY


 ORAL


   8/14/19 09:00


 9/9/19 09:14  8/16/19 09:15


 


 


 Heparin Sodium


  (Porcine)


  (Heparin 5000


 units/ml)  5,000 units  EVERY 12  HOURS


 SUBQ


   8/13/19 21:00


 9/8/19 10:59  8/16/19 09:17


 


 


 Hydralazine HCl


  (Apresoline)  75 mg  Q8HR


 ORAL


   8/14/19 14:00


 9/8/19 17:59  8/16/19 13:52


 


 


 Metoprolol


 Tartrate


  (Lopressor)  12.5 mg  Q12HR


 ORAL


   8/13/19 21:00


 9/9/19 09:14  8/16/19 09:15


 


 


 Morphine Sulfate


  (Morphine


 Sulfate)  2 mg  Q4H  PRN


 IVP


 Severe Pain (Pain Scale 7-10)  8/13/19 10:15


 8/20/19 10:14   


 


 


 Nifedipine


  (Procardia XL)  30 mg  BID


 ORAL


   8/14/19 18:00


 9/13/19 17:59  8/16/19 09:16


 


 


 Ondansetron HCl


  (Zofran)  4 mg  Q6H  PRN


 IVP


 Nausea & Vomiting  8/13/19 11:00


 9/8/19 10:59   


 


 


 Polyethylene


 Glycol


  (Miralax)  17 gm  DAILYPRN  PRN


 ORAL


 Constipation  8/13/19 10:15


 9/12/19 10:14   


 

















Alexa Davila M.D. Aug 16, 2019 15:19

## 2019-08-16 NOTE — NUR
RD ASSESSMENT & RECOMMENDATIONS

SEE CARE ACTIVITY FOR COMPLETE ASSESSMENT



DAILY ESTIMATED NEEDS:

Needs based on Cardiac 69kg 

25-30  kcals/kg 

4488-0594  total kcals

1-1.2  g protein/kg

69-83  g total protein 

20-25  mL/kg

5902-1463  total fluid mLs



NUTRITION DIAGNOSIS:

Decreased sodium needs r/t Cardiac history as evidenced by h/o CVA, BP is

elev.





PO DIET RECOMMENDATIONS:

Maintain Low Na diet  





ADDITIONAL RECOMMENDATIONS:

1) Rec mealtime assistance/ set up d/t finger dislocation  

2) SLP eval d/t h/o CVA  

3) Monitor BG, A1C 6.1 (current BG wnl) 

4) Adm w/ ARF, monitor lytes- need for dietary change

## 2019-08-16 NOTE — INTERNAL MED PROGRESS NOTE
Subjective


Physician Name


Frandy Vuong


Attending Physician


Frandy Vuong MD





Current Medications








 Medications


  (Trade)  Dose


 Ordered  Sig/Arlen


 Route


 PRN Reason  Start Time


 Stop Time Status Last Admin


Dose Admin


 


 Acetaminophen


  (Tylenol)  650 mg  Q4H  PRN


 ORAL


 T>100.5  8/13/19 10:14


 9/12/19 10:13   


 


 


 Albuterol/


 Ipratropium


  (Albuterol/


 Ipratropium)  3 ml  Q4H  PRN


 HHN


 Shortness of Breath  8/16/19 10:38


 8/21/19 10:37   


 


 


 Clonidine HCl


  (Catapres Tab)  0.1 mg  Q4H  PRN


 ORAL


 SBP>160  8/13/19 10:14


 9/12/19 10:13   


 


 


 Dextrose


  (Dextrose 50%)  25 ml  Q30M  PRN


 IV


 Hypoglycemia  8/13/19 10:30


 9/8/19 10:59   


 


 


 Dextrose


  (Dextrose 50%)  50 ml  Q30M  PRN


 IV


 Hypoglycemia  8/13/19 10:30


 9/8/19 10:59   


 


 


 Folic Acid


  (Folate)  2 mg  DAILY


 ORAL


   8/14/19 09:00


 9/9/19 09:14  8/16/19 09:15


 


 


 Heparin Sodium


  (Porcine)


  (Heparin 5000


 units/ml)  5,000 units  EVERY 12  HOURS


 SUBQ


   8/13/19 21:00


 9/8/19 10:59  8/16/19 20:18


 


 


 Hydralazine HCl


  (Apresoline)  75 mg  Q8HR


 ORAL


   8/14/19 14:00


 9/8/19 17:59  8/16/19 21:23


 


 


 Metoprolol


 Tartrate


  (Lopressor)  12.5 mg  Q12HR


 ORAL


   8/13/19 21:00


 9/9/19 09:14  8/16/19 20:17


 


 


 Morphine Sulfate


  (Morphine


 Sulfate)  2 mg  Q4H  PRN


 IVP


 Severe Pain (Pain Scale 7-10)  8/13/19 10:15


 8/20/19 10:14   


 


 


 Nifedipine


  (Procardia XL)  30 mg  BID


 ORAL


   8/14/19 18:00


 9/13/19 17:59  8/16/19 17:28


 


 


 Ondansetron HCl


  (Zofran)  4 mg  Q6H  PRN


 IVP


 Nausea & Vomiting  8/13/19 11:00


 9/8/19 10:59   


 


 


 Polyethylene


 Glycol


  (Miralax)  17 gm  DAILYPRN  PRN


 ORAL


 Constipation  8/13/19 10:15


 9/12/19 10:14   


 








Allergies:  


Coded Allergies:  


     No Known Allergies (Unverified , 8/9/19)


Subjective


Awake, alert, responsive, denies any shortness of breath or chest pain.





Objective





Last Vital Signs








  Date Time  Temp Pulse Resp B/P (MAP) Pulse Ox O2 Delivery O2 Flow Rate FiO2


 


8/16/19 21:32  67 18  97 Room Air  21


 


8/16/19 21:23    161/79    


 


8/16/19 20:00 98.8       


 


8/15/19 20:28       2.0 

















Intake and Output  


 


 8/15/19 8/16/19





 19:00 07:00


 


Intake Total 800 ml 


 


Output Total 1350 ml 600 ml


 


Balance -550 ml -600 ml


 


  


 


Intake Oral 800 ml 


 


Output Urine Total 1350 ml 600 ml








Objective


GENERAL:  The patient is awake and responsive, in no acute distress.


HEAD AND NECK:  Pupils are reactive to light.  Extraocular movements intact.


NECK:  Supple.  No JVD.


LUNGS:  Good air entry.  No wheezing or rales.


HEART:  Reveals S1, S2.  Distant heart sounds.  No gallops.


ABDOMEN:  Soft, nondistended, and nontender.  Positive bowel sounds.


EXTREMITIES:  No cyanosis, clubbing, or edema.  Right hand fourth finger with 

laceration and dislocation, finger has been reduced.


RECTAL/GENITOURINARY:  Refused and deferred.


PSYCHIATRIC:  Mood and affect is intact.





Assessment/Plan


Assessment/Plan


1. Status post mechanical fall with the right hand fourth finger laceration as 

well as dislocation status post reduction.


2. Hypertension, uncontrolled.


3. History of CVA with left hemiparesis.


4. Acute on Chronic kidney failure


5. Anemia.


6.  Homeless





PLAN:  


1.  in Medical unit.


2.  Dr. Tristan pulmonary consultation as well as 


3.  Dr. Davila from Infectious Disease.


4.  Dr. Fouladin from Nephrology. 


5.  Code status is Full Code.  


6.  DVT prophylaxis with heparin subcutaneous.  


7.  Discharge planning: Patient declined SNF placement, will talk to him again.


8.  Will monitor off antibiotics.











Frandy Vuong MD Aug 16, 2019 23:32

## 2019-08-16 NOTE — NUR
NURSE NOTES:

Pt received in bed, head of bed elevated, no c/o pain or signs of distress, able to make 
needs known, IV intact, call light within reach, will continue to monitor.

## 2019-08-16 NOTE — NUR
DISCHARGE PLANNING



DISCHARGE ORDER NOTED



Patient has been accepted to;



[facility] [address] [address]



Bed: 10-B

Skilled 

[phone] for Nurse to Nurse report



[facility] ETA for transportation: 19:00

## 2019-08-17 NOTE — NUR
NURSE NOTES:

Pt sitting on the chair. A/o x 4, calm. Denies pain. no SOB noted. splint on right finger, 
denies pain, no numbness, no tingling, skins intact. fall precaution maintained. call light 
within reach. will continue to monitor.

## 2019-08-17 NOTE — PULMONOLOGY PROGRESS NOTE
Assessment/Plan


Assessment/Plan


ASSESSMENT


Status post mechanical fall with right fourth finger laceration and dislocation

, status post reduction


Emphysema


Multiply pulmonary nodules


CVA with left-sided hemiparesis


Hypertension out-of-control


Chronic kidney disease


Aortic stenosis


Anemia








PLAN OF CARE


MS floor


fall precaution


pain management


PT Rx 


O2 HHN as needed


fup with  CT chest in 1 year,  given multiple pulmonary nodules


DVT prophylaxis


ECHO  with pEF 60% and evidence of aortic stenosis


BP management with multiple antihypertensive meds 


aspirin on hold , given anemia , HH stable 


CT of the head revealed chronic infarct right frontal lobe 


renal ultrasound revealed medical renal disease


avoid nephrotoxic , correct electrolytes as needed , monitor renal parameters 


supportive care 


dc plan     





case discussed and evaluated by supervising physician





Subjective


Allergies:  


Coded Allergies:  


     No Known Allergies (Unverified , 8/9/19)


Subjective


no signs of resp distress, no cough, 


remains afebrile working with PT





Objective





Last 24 Hour Vital Signs








  Date Time  Temp Pulse Resp B/P (MAP) Pulse Ox O2 Delivery O2 Flow Rate FiO2


 


8/17/19 09:21     98 Room Air  21


 


8/17/19 09:20  72 20  98 Room Air  21


 


8/17/19 09:03  78  144/73    


 


8/17/19 09:03  78  144/73    


 


8/17/19 09:00      Room Air  





      Room Air  


 


8/17/19 08:00 98.5 78 19 113/68 (83) 98   


 


8/17/19 05:57    169/69    


 


8/17/19 04:00 96.9 74 19 149/70 (96) 98   


 


8/17/19 00:00 97.9 72 19 167/67 (100) 98   


 


8/16/19 21:32  67 18  97 Room Air  21


 


8/16/19 21:32     97 Room Air  21


 


8/16/19 21:23    161/79    


 


8/16/19 21:00      Room Air  





      Room Air  


 


8/16/19 20:17  71  153/60    


 


8/16/19 20:00 98.8 71 21 153/60 (91) 95   


 


8/16/19 17:28  71  151/72    


 


8/16/19 16:00 98.5 71 18 151/72 (98) 97   


 


8/16/19 13:52    155/73    


 


8/16/19 12:00 97.7 66 18 155/73 (100) 97   

















Intake and Output  


 


 8/16/19 8/17/19





 19:00 07:00


 


Intake Total 1200 ml 500 ml


 


Output Total 1480 ml 850 ml


 


Balance -280 ml -350 ml


 


  


 


Intake Oral 1200 ml 500 ml


 


Output Urine Total 1480 ml 850 ml








Objective


General Appearance:  no acute distress


HEENT:  normocephalic, atraumatic, anicteric, mucous membranes moist


Respiratory/Chest:  lungs clear, no respiratory distress, no accessory muscle 

use


Cardiovascular:  normal rate


Abdomen:  normal bowel sounds, soft, non tender


Extremities:  no edema


Neurologic/Psychiatric:  alert, oriented x 3, responsive,   left hemiparesis


Musculoskeletal:  atrophy - BLE





Current Medications








 Medications


  (Trade)  Dose


 Ordered  Sig/Arlen


 Route


 PRN Reason  Start Time


 Stop Time Status Last Admin


Dose Admin


 


 Acetaminophen


  (Tylenol)  650 mg  Q4H  PRN


 ORAL


 T>100.5  8/13/19 10:14


 9/12/19 10:13   


 


 


 Albuterol/


 Ipratropium


  (Albuterol/


 Ipratropium)  3 ml  Q4H  PRN


 HHN


 Shortness of Breath  8/16/19 10:38


 8/21/19 10:37   


 


 


 Clonidine HCl


  (Catapres Tab)  0.1 mg  Q4H  PRN


 ORAL


 SBP>160  8/13/19 10:14


 9/12/19 10:13   


 


 


 Dextrose


  (Dextrose 50%)  25 ml  Q30M  PRN


 IV


 Hypoglycemia  8/13/19 10:30


 9/8/19 10:59   


 


 


 Dextrose


  (Dextrose 50%)  50 ml  Q30M  PRN


 IV


 Hypoglycemia  8/13/19 10:30


 9/8/19 10:59   


 


 


 Folic Acid


  (Folate)  2 mg  DAILY


 ORAL


   8/14/19 09:00


 9/9/19 09:14  8/17/19 09:04


 


 


 Heparin Sodium


  (Porcine)


  (Heparin 5000


 units/ml)  5,000 units  EVERY 12  HOURS


 SUBQ


   8/13/19 21:00


 9/8/19 10:59  8/17/19 09:09


 


 


 Hydralazine HCl


  (Apresoline)  75 mg  Q8HR


 ORAL


   8/14/19 14:00


 9/8/19 17:59  8/17/19 05:57


 


 


 Metoprolol


 Tartrate


  (Lopressor)  12.5 mg  Q12HR


 ORAL


   8/13/19 21:00


 9/9/19 09:14  8/17/19 09:03


 


 


 Morphine Sulfate


  (Morphine


 Sulfate)  2 mg  Q4H  PRN


 IVP


 Severe Pain (Pain Scale 7-10)  8/13/19 10:15


 8/20/19 10:14   


 


 


 Nifedipine


  (Procardia XL)  30 mg  BID


 ORAL


   8/14/19 18:00


 9/13/19 17:59  8/17/19 09:03


 


 


 Ondansetron HCl


  (Zofran)  4 mg  Q6H  PRN


 IVP


 Nausea & Vomiting  8/13/19 11:00


 9/8/19 10:59   


 


 


 Polyethylene


 Glycol


  (Miralax)  17 gm  DAILYPRN  PRN


 ORAL


 Constipation  8/13/19 10:15


 9/12/19 10:14   


 

















Denise Gonzales NP Aug 17, 2019 10:08

## 2019-08-17 NOTE — INFECTIOUS DISEASES PROG NOTE
Assessment/Plan


Assessment/Plan


Afebrile


No leukocytosis





R 4th finger pan-2ry to PIP dislocation and laceration, s/p reduction


  -repeat R hand xray: Successful reduction of the previously seen dislocation 

at the fourth proximal interphalangeal joint. No definite/displaced acute 

fracture.


  -xray R hand:  Dislocation at the fourth proximal interphalangeal joint. No 

definite/displaced associated acute fracture.Osteoarthrosis.


 


s/p Fall


  -Head CT: Exam limited by motion artifact. Within this limitations:No 

evidence of acute intracranial hemorrhage, mass effect or cortical edema. MRI 

maybe obtained for more sensitive evaluation as clinically indicated. Atrophy 

and nonspecific periventricular hypoattenuation suggestive of chronic ischemic 

microvascular changes. Likely chronic infarct in the right frontal lobe. Left 

supraorbital soft tissue swelling. No depressed skull fracture.


  -CT chest: No discrete mass.Pulmonary emphysema.Cardiomegaly.  Small 

pericardial fluid.Small left and trace right pleural effusions.  Right renal 

cyst. 


  -CT neck: No discrete mass. Pulmonary emphysema. Cervical spondylosis with 

central canal and foraminal stenosis.


 





Dyspnea


  -CXR:   Nonspecific mild interstitial prominence, possibly chronic. No 

definite focal consolidation, pleural effusion, pneumothorax or evidence of 

alveolar edema. Borderline cardiomegaly, possibly exaggerated by AP 

technique.Bilateral hilar prominence most likely related to ectatic pulmonary 

vasculature.








VIKASH, improving


   -u/a neg


   -Renal US:  Slightly increased echogenicity within the kidneys which can be 

seen in the setting of medical renal disease.  No hydronephrosis.  Cyst within 

the right renal upper pole.  No stones.  Unremarkable appearance of the 

bladder. 


 





CVA w L side weakness


HTN


 appendectomy





Plan:


-Monitor off abx


    -8/14 SP keflex #6


    -8/9 IV Vancomycin, Ceftriaxone #1, Cefepime x1





-f/u cx


-Monitor CBC/CMP, temperatures


-wound care





Thank you for this consultation. Will continue to follow along with  you.








Subjective


Allergies:  


Coded Allergies:  


     No Known Allergies (Unverified , 8/9/19)


Subjective


Comfortable on RA


Afebrile


No leukocytosis





Objective


Vital Signs





Last 24 Hour Vital Signs








  Date Time  Temp Pulse Resp B/P (MAP) Pulse Ox O2 Delivery O2 Flow Rate FiO2


 


8/17/19 09:21     98 Room Air  21


 


8/17/19 09:20  72 20  98 Room Air  21


 


8/17/19 09:03  78  144/73    


 


8/17/19 09:03  78  144/73    


 


8/17/19 09:00      Room Air  





      Room Air  


 


8/17/19 08:00 98.5 78 19 113/68 (83) 98   


 


8/17/19 05:57    169/69    


 


8/17/19 04:00 96.9 74 19 149/70 (96) 98   


 


8/17/19 00:00 97.9 72 19 167/67 (100) 98   


 


8/16/19 21:32  67 18  97 Room Air  21


 


8/16/19 21:32     97 Room Air  21


 


8/16/19 21:23    161/79    


 


8/16/19 21:00      Room Air  





      Room Air  


 


8/16/19 20:17  71  153/60    


 


8/16/19 20:00 98.8 71 21 153/60 (91) 95   


 


8/16/19 17:28  71  151/72    


 


8/16/19 16:00 98.5 71 18 151/72 (98) 97   


 


8/16/19 13:52    155/73    


 


8/16/19 12:00 97.7 66 18 155/73 (100) 97   








Height (Feet):  5


Height (Inches):  10.00


Weight (Pounds):  152


Objective


General Appearance:  GEN


HEENT:  normocephalic, atraumatic, MMM


Respiratory/Chest:  chest wall non-tender, lungs clear, No W


Cardiovascular/Chest:  RRR, S1, S2


Abdomen:  normal bowel sounds, ND, NT


Extremities:  , laceration - R 4th finger laceration





Current Medications








 Medications


  (Trade)  Dose


 Ordered  Sig/Arlen


 Route


 PRN Reason  Start Time


 Stop Time Status Last Admin


Dose Admin


 


 Acetaminophen


  (Tylenol)  650 mg  Q4H  PRN


 ORAL


 T>100.5  8/13/19 10:14


 9/12/19 10:13   


 


 


 Albuterol/


 Ipratropium


  (Albuterol/


 Ipratropium)  3 ml  Q4H  PRN


 HHN


 Shortness of Breath  8/16/19 10:38


 8/21/19 10:37   


 


 


 Clonidine HCl


  (Catapres Tab)  0.1 mg  Q4H  PRN


 ORAL


 SBP>160  8/13/19 10:14


 9/12/19 10:13   


 


 


 Dextrose


  (Dextrose 50%)  25 ml  Q30M  PRN


 IV


 Hypoglycemia  8/13/19 10:30


 9/8/19 10:59   


 


 


 Dextrose


  (Dextrose 50%)  50 ml  Q30M  PRN


 IV


 Hypoglycemia  8/13/19 10:30


 9/8/19 10:59   


 


 


 Folic Acid


  (Folate)  2 mg  DAILY


 ORAL


   8/14/19 09:00


 9/9/19 09:14  8/17/19 09:04


 


 


 Heparin Sodium


  (Porcine)


  (Heparin 5000


 units/ml)  5,000 units  EVERY 12  HOURS


 SUBQ


   8/13/19 21:00


 9/8/19 10:59  8/17/19 09:09


 


 


 Hydralazine HCl


  (Apresoline)  75 mg  Q8HR


 ORAL


   8/14/19 14:00


 9/8/19 17:59  8/17/19 05:57


 


 


 Metoprolol


 Tartrate


  (Lopressor)  12.5 mg  Q12HR


 ORAL


   8/13/19 21:00


 9/9/19 09:14  8/17/19 09:03


 


 


 Morphine Sulfate


  (Morphine


 Sulfate)  2 mg  Q4H  PRN


 IVP


 Severe Pain (Pain Scale 7-10)  8/13/19 10:15


 8/20/19 10:14   


 


 


 Nifedipine


  (Procardia XL)  30 mg  BID


 ORAL


   8/14/19 18:00


 9/13/19 17:59  8/17/19 09:03


 


 


 Ondansetron HCl


  (Zofran)  4 mg  Q6H  PRN


 IVP


 Nausea & Vomiting  8/13/19 11:00


 9/8/19 10:59   


 


 


 Polyethylene


 Glycol


  (Miralax)  17 gm  DAILYPRN  PRN


 ORAL


 Constipation  8/13/19 10:15


 9/12/19 10:14   


 

















Ferdinand Ku MD Aug 17, 2019 11:59

## 2019-08-17 NOTE — NUR
NURSE NOTES:

Report given to Elise RN at [facility]. pt stable condition, VS stable. denies pain, no SOB 
noted. no family contact/pt refused. splint on right 4th finger, no numbness no tingling, no 
redness. IV remove. Prescriptions and Belonging with pts. will Dc pt per order.

## 2019-08-17 NOTE — NEPHROLOGY PROGRESS NOTE
Assessment/Plan


Problem List:  


(1) Renal failure (ARF), acute on chronic


(2) History of hypertension


(3) Anemia


(4) History of CVA (cerebrovascular accident)


Assessment





Renal Failure-


? acute obn top of chronic


Anemia


HTN


h/o CVA


Rt 4th finger laceration: reason for admission


Plan





watch serum Cr on Ace I


Anemia farley bit of low folate


U Na


U Analysis


BP control, further adjustment done


Avoid Nephrotoxics


? DC planning?





Subjective


ROS Limited/Unobtainable:  No





Objective


Objective





Last 24 Hour Vital Signs








  Date Time  Temp Pulse Resp B/P (MAP) Pulse Ox O2 Delivery O2 Flow Rate FiO2


 


8/17/19 09:21     98 Room Air  21


 


8/17/19 09:20  72 20  98 Room Air  21


 


8/17/19 09:03  78  144/73    


 


8/17/19 09:03  78  144/73    


 


8/17/19 09:00      Room Air  





      Room Air  


 


8/17/19 08:00 98.5 78 19 113/68 (83) 98   


 


8/17/19 05:57    169/69    


 


8/17/19 04:00 96.9 74 19 149/70 (96) 98   


 


8/17/19 00:00 97.9 72 19 167/67 (100) 98   


 


8/16/19 21:32  67 18  97 Room Air  21


 


8/16/19 21:32     97 Room Air  21


 


8/16/19 21:23    161/79    


 


8/16/19 21:00      Room Air  





      Room Air  


 


8/16/19 20:17  71  153/60    


 


8/16/19 20:00 98.8 71 21 153/60 (91) 95   


 


8/16/19 17:28  71  151/72    


 


8/16/19 16:00 98.5 71 18 151/72 (98) 97   


 


8/16/19 13:52    155/73    


 


8/16/19 12:00 97.7 66 18 155/73 (100) 97   

















Intake and Output  


 


 8/16/19 8/17/19





 19:00 07:00


 


Intake Total 1200 ml 500 ml


 


Output Total 1480 ml 850 ml


 


Balance -280 ml -350 ml


 


  


 


Intake Oral 1200 ml 500 ml


 


Output Urine Total 1480 ml 850 ml








Height (Feet):  5


Height (Inches):  10.00


Weight (Pounds):  152


General Appearance:  no apparent distress


Objective


no change











Humberto De Jesus MD Aug 17, 2019 10:11

## 2019-08-19 NOTE — DISCHARGE SUMMARY
Discharge Summary


Discharge Summary


_


DATE OF ADMISSION: 8/9/2019





DATE OF DISCHARGE: 8/17/2019








DISCHARGED BY: Dr. Vuong





REASON FOR ADMISSION: 


84 years old male with past medical history of hypertension, CVA with  left-

sided weakness, appendectomy, presented to emergency department complaining of 

the right fourth finger pain after a fall .


Patient fell about 30 minutes prior to arrival to emergency department. 


He tripped  and fell on the sidewalk and hit his head and right hand.  


He denied loss of consciousness.


He denied bowel or bladder incontinence.  He denied   double vision. 


He complained about shortness of breath , however denied chest pain nausea,, or 

vomiting.  


CT of the head revealed no evidence of acute intracranial hemorrhage, mass-

effect or cortical edema.  Chronic infarct in the right frontal lobe noted.  


Left supraorbital soft tissue swelling.  No depressed skull fracture.  


Patient undergone irrigation and debridement and primary closure with reduction

  of the fourth PIP joint in the emergency department.


Post reduction films showed that the fracture was successfully reduced.


Patient subsequently was admitted  for  mechanical fall with right hand fourth 

finger laceration and PIP dislocation,  status post reduction.


 


CONSULTANTS:


cardiologist Dr. Oviedo


pulmonary /critical care  Dr. Tristan


ID specialist Dr. Davila


nephrologist Dr. De Jesus


orthopedic surgeon Dr. Mcneal








[facility] COURSE: 


Patient initially admitted to monitored floor..


Echocardiogram revealed preserved ejection fraction of 60% and evidence of 

aortic stenosis.  


Blood pressure was managed with multiple antihypertensive medications per 

cardiologist.  





Supplemental oxygen   provided as needed to keep pulse oximetry above 92%.


Bronchodilator therapy provided as needed.


CT of the chest revealed moderate emphysema, mild interstitial edema.  Left 

greater than right small pleural effusion.  


Scattered tiny pulmonary nodules less than 2 mm.  


Solid 3 mm left lower lobe nodule.  5 mm and 4 mm solid subpleural nodule 

cluster in the left lower lobe.  


No evidence of cervical soft tissue mass.  


Follow-up CT in  1 year was recommended for surveillance of larger nodules.





Orthopedic surgeon seen the patient.  Reduction of PIP joint was successful.  


Orthopedic surgeon recommended  continue patient on oral Keflex.  


Patient  need to follow-up in 10 to 14 days for suture removal.    





Infectious disease specialist followed.  


Patient  completed treatment with  prophylactic Keflex.  


Patient remained afebrile , no leukocytosis..





Renal ultrasound revealed chronic medical renal disease.  


Renal parameters and electrolytes were closely monitored.  


Electrolytes corrected as needed, and nephrotoxins were avoided.  


Nephrologist follow.  


Supportive care provided.





Hemoglobin and hematocrit were closely monitored with goal to keep hemoglobin 

above 7.  


Anemia work-up was consistent with anemia of chronic disease.  


Hemoglobin  and hematocrit remained at  the baseline .


Prior to discharge hemoglobin 9.3 a, nd hematocrit 29.7.





Patient was working  with physical therapist.  


Fall precaution maintained.  


Patient clinically stabilized and was ready for discharge.





Placement was found and arranged at the  skilled nursing facility.  


Patient subsequently was transferred to skilled nursing facility for 

continuation of care.





FINAL DIAGNOSES: 


Status post mechanical fall


Right hand fourth finger  open laceration  


Right dorsal dislocation of PIP joint,  fourth finger, s/p reduction   


Hypertension,  uncontrolled


History of CVA with   left hemiparesis


Aortic stenosis


Emphysema


Multiply pulmonary nodules


Acute on chronic kidney disease


Anemia


Homeless





DISCHARGE MEDICATIONS:


See Medication Reconciliation list.





DISCHARGE INSTRUCTIONS:


Patient was discharged to the skilled nursing facility. 


Follow up with medical doctor at the facility.











Denise Gonzales NP Aug 19, 2019 08:20

## 2019-09-08 NOTE — NUR
ED Nurse Note:

Pt BIBA [facility] from [facility] c/o numbess in hands and feet after receiving 
heparin and nifedipine at 1800. Per EMS, pt became aggessive after med 
administration. Pt denies pain. Pt is AO x 4times, VSS, on room air no 
distress. ERMD seen Pt at bedside.

## 2019-09-08 NOTE — EMERGENCY ROOM REPORT
History of Present Illness


General


Chief Complaint:  General Complaint


Source:  Patient, Medical Record





Present Illness


HPI


This an 85-year-old male with history of CVA and hypertension.  He presents 

with chief complaint of numbness to his lower extremity.  He said that he never 

had this problem before.  He woke up and felt numb to his lower extremity.  

Said he felt like he is walking on clouds.  He thinks that he was poisoned by 

the nursing home staff.  He said he got heparin and nifedipine today.  

Afterward this happened.  He denies any focal deficit.  No nausea no vomiting.  

No fever chills.  Nursing staff said he is aggressive and want a psychiatric 

evaluation.


Allergies:  


Coded Allergies:  


     No Known Allergies (Unverified , 8/9/19)





Patient History


Past Medical History:  see triage record, HTN, CVA/TIA


Past Surgical History:  other


Pertinent Family History:  none


Social History:  Denies: smoking


Immunizations:  other


Reviewed Nursing Documentation:  PMH: Agreed; PSxH: Agreed





Nursing Documentation-PMH


Hx Hypertension:  Yes


Hx Cancer:  No


Hx Gastrointestinal Problems:  No


Hx Neurological Problems:  Yes


Hx Cerebrovascular Accident:  Yes - 8/2018


Hx Weakness:  Yes





Review of Systems


Eye:  Denies: eye pain, blurred vision


ENT:  Denies: ear pain, nose congestion, throat swelling


Respiratory:  Denies: cough, shortness of breath


Cardiovascular:  Denies: chest pain, palpitations


Gastrointestinal:  Denies: abdominal pain, diarrhea, nausea, vomiting


Musculoskeletal:  Denies: back pain, joint pain


Skin:  Denies: rash


Neurological:  Denies: headache, numbness


Endocrine:  Denies: increased thirst, increased urine


Hematologic/Lymphatic:  Denies: easy bruising


All Other Systems:  negative except mentioned in HPI





Physical Exam





Vital Signs








  Date Time  Temp Pulse Resp B/P (MAP) Pulse Ox O2 Delivery O2 Flow Rate FiO2


 


9/8/19 21:54 98.2 80 18 182/80 (114) 98 Room Air  





Vitals with high blood pressure


Sp02 EP Interpretation:  reviewed, normal


General Appearance:  well appearing, no apparent distress, alert


Head:  normocephalic, atraumatic


Eyes:  bilateral eye PERRL, bilateral eye EOMI


ENT:  hearing grossly normal, normal pharynx


Neck:  full range of motion, supple, no meningismus


Respiratory:  chest non-tender, lungs clear, normal breath sounds


Cardiovascular #1:  regular rate, rhythm, no murmur


Gastrointestinal:  normal bowel sounds, non tender, no mass, no organomegaly, 

no bruit, non-distended


Musculoskeletal:  back normal, normal range of motion


Neurologic:  alert, oriented x3


Psychiatric:  other - Pt is loud and aggressive





Medical Decision Making


Diagnostic Impression:  


 Primary Impression:  


 Agitation


 Additional Impressions:  


 Peripheral neuropathy


 Hypertension


 Qualified Codes:  I10 - Essential (primary) hypertension


ER Course


Pt presents with neuropathy and agitation.  CT head negative.  Labs 

unremarkable.  Will admit for further work-up and psychiatric evaluation.  I 

discussed the case with Dr. Vuong who will admit.


CT/MRI/US Diagnostic Results


CT/MRI/US Diagnostic Results :  


   Imaging Test Ordered:  CT head


   Impression


Neg per radiologist.





Last Vital Signs








  Date Time  Temp Pulse Resp B/P (MAP) Pulse Ox O2 Delivery O2 Flow Rate FiO2


 


9/8/19 21:54 98.2 80 18 182/80 (114) 98 Room Air  








Status:  improved


Disposition:  ADMITTED AS INPATIENT


Condition:  Serious











Shivam Donohue MD Sep 8, 2019 22:20

## 2019-09-08 NOTE — DIAGNOSTIC IMAGING REPORT
CT HEAD Without Contrast:

 

COMPARISON: CT head 8/9/19

 

IMPRESSION:

 

No CT evidence of acute intracranial process.

 

No mastoid effusion or sinus fluid level in the visualized segments.

 

No depressed calvarial fracture.

 

INCIDENTAL FINDINGS:

 

Stable volume loss, chronic microvascular ischemic change, and right 

frontal encephalomalacia.

 

Stable ventricular size/configuration.

## 2019-09-09 NOTE — NUR
NURSE NOTES:

pt explained he never had flu and PNA vaccine before and asked to do upon D/C. noted and 
carried out. pharmacist YE is aware.

## 2019-09-09 NOTE — NUR
NURSE NOTES:

Dr QUIROS notified that pt refuses HEP and he ordered to D/C HEP and change DVT to 
ambulate, noted and carried out. and Dr QUIROS is aware about HTN, no new order to RN. He 
will F/U. Will continue to monitor.

## 2019-09-09 NOTE — NUR
NURSE NOTES:

Received pt from SAEID FORD. Pt is alert and orient x4. pt is in RA, No SOB or acute 
respiratory distress noted. pt has intact iv access LH 20G SL. Pt is eating breakfast 
independently. all needs attended, bed is locked and is in the lowest position. call light 
with easy reach. will continue to monitor.

## 2019-09-09 NOTE — NUR
NURSE NOTES:

Received report from LILIANA Gurrola. Patient a/a/o breathing unlabored without distress, 
discomfort, or sob. Denies pain at this time. IV noted on left hand 20g intact, dry, clean, 
and patent. Condom cath intact draining urine. Walker at the bedside. Bed placed at the 
lowest with alarm, brake, and siderails up for safety. Call light placed within reach. Will 
continue to monitor and provide care as ordered.

## 2019-09-09 NOTE — HISTORY & PHYSICAL
History and Physical


History & Physicial


Cover for Int Med-Dr Vuong no. [id number]











Piero Harman MD Sep 9, 2019 19:40

## 2019-09-09 NOTE — NUR
NURSE NOTES:

Pt received alert and oriented X4, compliant, pt with belongings at bedside, laptop and cell 
phone, no c/o pain or signs of distress, needed assistance walking and getting into bed, 
will contact MD Vuong for admitting orders, will continue to monitor.

## 2019-09-09 NOTE — NUR
CASE MANAGEMENT:REVIEW



85 YR OLD [facility]



SI: ALTERED MENTAL STATUS

98.2   80  18  182/80   98% ON RA

H/H-11.6/34.8   BUN+33  CR+1.9



IS: IV ATIVAN

CT HEAD

**: TO TELEMETRY 



PLAN:

NEURO CHECKS Q4HRS

## 2019-09-09 NOTE — HISTORY AND PHYSICAL REPORT
DATE OF ADMISSION:  09/08/2019

CHIEF COMPLAINT:  The patient is an 85-year-old  male, who

presents with a chief complaint of bilateral lower extremity numbness and

altered mental status.



HISTORY OF PRESENT ILLNESS:  The patient was admitted to [facility] from August 9, 2019 to August 17, 2019.  The patient was diagnosed

with dislocation of the right fourth finger PIP joint.  The patient was

discharged to [facility].  Please

see history and physical and discharge summary dictated at that time.



The patient presented to [facility] after staff at

[facility] stated that the

patient had altered mental status.  Upon arrival at [place] emergency

room, the patient was complaining of numbness to his bilateral lower

extremities.  The patient states that his feet are completely numb.  The

patient states he feels like he is walking on clouds.  The patient also

demonstrated some paranoia stating that he thought he had been poisoned by

the nursing home staff.



The patient was admitted to [facility] for altered mental

status and peripheral neuropathy.



PAST MEDICAL HISTORY:  Significant for:



1. Cerebrovascular disease, status post cerebrovascular accident.

2. Left hemiparesis.

3. Hypertension.



PAST SURGICAL HISTORY:  Significant for appendectomy.



CURRENT MEDICATIONS:

1. Multivitamins.

2. Norvasc 10 mg p.o. daily.



ALLERGIES:  No known drug allergies.



SOCIAL HISTORY:  The patient denies alcohol or tobacco use.  The patient is

a resident of [facility] as

above.



PHYSICAL EXAMINATION:

VITAL SIGNS:  Temperature 98, respirations 18, pulse 97, and blood pressure

177/79.

GENERAL:  The patient is a well-developed and well nourished thin-appearing

 male, in no apparent distress.

HEENT:  Eyes, pupils are equal and responsive to light and accommodation.

Extraocular movements are intact.

NECK:  Supple without lymphadenopathy.

CHEST:  Lungs are clear to auscultation bilaterally without wheezes or

rales.

CARDIOVASCULAR:  Regular rhythm and rate.  S1 and S2 are normal without

murmurs, rubs, or gallops.

ABDOMEN:  Soft, nontender, and nondistended.  Positive bowel sounds.  No

evidence of hepatosplenomegaly.  Currently, no rebound or guarding

noted.

EXTREMITIES:  Negative for clubbing, cyanosis, or edema.

RECTAL/GENITAL:  Not performed.

NEUROLOGIC:  Cranial nerves II through XII are grossly intact without focal

deficits.  Motor strength is 3/5 on the left and 5/5 on the right.



LABORATORY STUDIES:  WBC 10.4, hemoglobin 11.6, hematocrit 34.8, and

platelets 220,000.  Sodium 143, potassium 5.1, chloride 110, CO2 22, BUN

33, creatinine 1.9, and glucose 110.  Urinalysis showed within normal

limits.  A CT scan of the brain was reported as no acute infarct or

hemorrhage.



ASSESSMENT:  This is an 85-year-old  male.



1. Altered mental status.

2. Paranoia.

3. Cerebrovascular disease.

4. Left hemiparesis.

5. Hypertension.



TREATMENT:

1. Altered mental status.  This may be metabolic encephalopathy versus

acute psychosis.  A Psychiatric consultation has been obtained with Dr. Randall.  We will follow recommendation of Psychiatry.

2. Cerebrovascular disease.  The patient has been started empirically on

aspirin.

3. Left hemiparesis.

4. Hypertension.  Continue Norvasc as above.









  ______________________________________________

  Piero Harman M.D.





DR:  MADELIN

D:  09/09/2019 19:38

T:  09/09/2019 22:08

JOB#:  [id number]

CC:

## 2019-09-09 NOTE — CONSULTATION
History of Present Illness


General


Date patient seen:  Sep 9, 2019


Chief Complaint:  General Complaint





Present Illness


HPI


 84-year-old male with hx of HTN, CVA with left sided weakness, [place] 

[place] [facility] [place] with CC of paresthesia in fingers and 

episode of agitation in the nursing home. He claims that he got Heparin SQ 

which caused all of his symptoms.    His labs revealed ATN as well.  He is 

admitted to med/surg for further management.


Allergies:  


Coded Allergies:  


     No Known Allergies (Unverified , 8/9/19)





Medication History


Scheduled


Amlodipine Besylate (Norvasc), 10 MG ORAL DAILY, (Reported)


Amlodipine Besylate (Norvasc), 5 MG ORAL BID


Cephalexin (Cephalexin), 500 MG ORAL EVERY 8 HOURS


Hydralazine HCl (Hydralazine HCl), 50 MG ORAL Q8HR





Miscellaneous Medications


Unable to Obtain Medications (Unable To Obtain Meds), (Reported)





Patient History


Healthcare decision maker





Resuscitation status


Full Code


Advanced Directive on File








Past Medical/Surgical History


Past Medical/Surgical History:  


(1) History of hypertension


(2) Hypertension


(3) Peripheral neuropathy


(4) History of CVA (cerebrovascular accident)





Review of Systems


All Other Systems:  negative except mentioned in HPI





Physical Exam


General Appearance:  cachetic, thin


Lines, tubes and drains:  peripheral


HEENT:  normocephalic, atraumatic


Neck:  non-tender, normal alignment


Respiratory/Chest:  chest wall non-tender, lungs clear


Breasts:  no masses


Cardiovascular/Chest:  normal peripheral pulses


Abdomen:  normal bowel sounds, non tender


Genitourinary/Rectal:  normal genital exam


Extremities:  normal range of motion


Skin Exam:  normal pigmentation


Neurologic:  CNs II-XII grossly normal





Last 24 Hour Vital Signs








  Date Time  Temp Pulse Resp B/P (MAP) Pulse Ox O2 Delivery O2 Flow Rate FiO2


 


9/9/19 13:10    161/64    


 


9/9/19 12:00 97.7 68 18 161/64 (96) 98   


 


9/9/19 09:00      Room Air  


 


9/9/19 08:35  94  144/75    


 


9/9/19 08:00 99.0 94 20 144/75 (98) 98   


 


9/9/19 05:24    150/76    


 


9/9/19 04:00 98.9 91 19 150/76 (100) 96   


 


9/9/19 01:23      Room Air  


 


9/9/19 01:16 98.0 97 18 177/79 100 Room Air  


 


9/9/19 00:56 98.0 97 18 177/79 100 Room Air  


 


9/9/19 00:53    177/79    


 


9/9/19 00:17    186/74    


 


9/9/19 00:11 98.2 95 16 186/74 100 Room Air  


 


9/8/19 23:49    181/67    


 


9/8/19 23:05 98.2 83 19 181/67 100 Room Air  


 


9/8/19 23:01  89 18   Room Air  


 


9/8/19 21:54 98.2 80 18 182/80 (114) 98 Room Air  

















Intake and Output  


 


 9/8/19 9/9/19





 19:00 07:00


 


Intake Total  540 ml


 


Output Total  450 ml


 


Balance  90 ml


 


  


 


Intake Oral  540 ml


 


Output Urine Total  450 ml


 


# Voids  2


 


# Bowel Movements  1











Laboratory Tests








Test


  9/8/19


22:20 9/8/19


23:30


 


White Blood Count


  10.4 K/UL


(4.8-10.8) 


 


 


Red Blood Count


  4.28 M/UL


(4.70-6.10)  L 


 


 


Hemoglobin


  11.6 G/DL


(14.2-18.0)  L 


 


 


Hematocrit


  34.8 %


(42.0-52.0)  L 


 


 


Mean Corpuscular Volume 81 FL (80-99)   


 


Mean Corpuscular Hemoglobin


  27.1 PG


(27.0-31.0) 


 


 


Mean Corpuscular Hemoglobin


Concent 33.3 G/DL


(32.0-36.0) 


 


 


Red Cell Distribution Width


  12.4 %


(11.6-14.8) 


 


 


Platelet Count


  223 K/UL


(150-450) 


 


 


Mean Platelet Volume


  5.5 FL


(6.5-10.1)  L 


 


 


Neutrophils (%) (Auto)


  % (45.0-75.0)


  


 


 


Lymphocytes (%) (Auto)


  % (20.0-45.0)


  


 


 


Monocytes (%) (Auto)  % (1.0-10.0)   


 


Eosinophils (%) (Auto)  % (0.0-3.0)   


 


Basophils (%) (Auto)  % (0.0-2.0)   


 


Differential Total Cells


Counted 100  


  


 


 


Neutrophils % (Manual) 82 % (45-75)  H 


 


Lymphocytes % (Manual) 8 % (20-45)  L 


 


Monocytes % (Manual) 5 % (1-10)   


 


Eosinophils % (Manual) 0 % (0-3)   


 


Basophils % (Manual) 1 % (0-2)   


 


Band Neutrophils 4 % (0-8)   


 


Platelet Estimate Adequate   


 


Platelet Morphology Normal   


 


Hypochromasia 1+   


 


Anisocytosis 1+   


 


Sodium Level


  143 MMOL/L


(136-145) 


 


 


Potassium Level


  5.1 MMOL/L


(3.5-5.1) 


 


 


Chloride Level


  110 MMOL/L


()  H 


 


 


Carbon Dioxide Level


  22 MMOL/L


(21-32) 


 


 


Anion Gap


  11 mmol/L


(5-15) 


 


 


Blood Urea Nitrogen


  33 mg/dL


(7-18)  H 


 


 


Creatinine


  1.9 MG/DL


(0.55-1.30)  H 


 


 


Estimat Glomerular Filtration


Rate  mL/min (>60)  


  


 


 


Glucose Level


  110 MG/DL


()  H 


 


 


Calcium Level


  9.5 MG/DL


(8.5-10.1) 


 


 


Urine Color  Pale yellow  


 


Urine Appearance  Clear  


 


Urine pH  6 (4.5-8.0)  


 


Urine Specific Gravity


  


  1.015


(1.005-1.035)


 


Urine Protein


  


  Negative


(NEGATIVE)


 


Urine Glucose (UA)


  


  Negative


(NEGATIVE)


 


Urine Ketones


  


  Negative


(NEGATIVE)


 


Urine Blood


  


  Negative


(NEGATIVE)


 


Urine Nitrite


  


  Negative


(NEGATIVE)


 


Urine Bilirubin


  


  Negative


(NEGATIVE)


 


Urine Urobilinogen


  


  Normal MG/DL


(0.0-1.0)


 


Urine Leukocyte Esterase


  


  Negative


(NEGATIVE)


 


Urine RBC


  


  0 /HPF (0 - 0)


 


 


Urine WBC


  


  0 /HPF (0 - 0)


 


 


Urine Squamous Epithelial


Cells 


  None /LPF


(NONE/OCC)


 


Urine Bacteria


  


  None /HPF


(NONE)











Microbiology








 Date/Time


Source Procedure


Growth Status


 


 


 9/8/19 22:00


Rectum  Received








Height (Feet):  5


Height (Inches):  10.00


Weight (Pounds):  140


Medications





Current Medications








 Medications


  (Trade)  Dose


 Ordered  Sig/Arlen


 Route


 PRN Reason  Start Time


 Stop Time Status Last Admin


Dose Admin


 


 Amlodipine


 Besylate


  (Norvasc)  10 mg  DAILY


 ORAL


   9/9/19 09:00


 10/9/19 08:59  9/9/19 08:35


 


 


 Clonidine HCl


  (Catapres Tab)  0.1 mg  Q4H  PRN


 ORAL


 For High Blood Pressure  9/9/19 13:45


 10/9/19 13:44 UNV  


 


 


 Heparin Sodium


  (Porcine)


  (Heparin 5000


 units/ml)  5,000 units  EVERY 12  HOURS


 SUBQ


   9/9/19 09:00


 10/9/19 08:59   


 


 


 Hydralazine HCl


  (Apresoline)  100 mg  Q8HR


 ORAL


   9/9/19 14:00


 10/9/19 05:59 UNV  


 


 


 Influenza Virus


 Vacc Trival Recomb


  (Flu Vac


 High-Dose  for


 Pts 65 Years and


 Older)  0.5 ml  ONCE ONCE


 IM


   9/9/19 09:00


 9/9/19 09:01 UNV  


 


 


 Pneumococcal


 Polyvalent Vaccine


  (Pneumovax)  0.5 ml  ONCE ONCE


 IM


   9/9/19 09:00


 9/9/19 09:01 UNV  


 











Assessment/Plan


Problem List:  


(1) Renal failure (ARF), acute on chronic


ICD Codes:  N17.9 - Acute kidney failure, unspecified; N18.9 - Chronic kidney 

disease, unspecified


SNOMED:  148619279


(2) Anemia


ICD Codes:  D64.9 - Anemia, unspecified


SNOMED:  993230795


(3) Peripheral neuropathy


ICD Codes:  G62.9 - Polyneuropathy, unspecified


SNOMED:  187199869


(4) Agitation


ICD Codes:  R45.1 - Restlessness and agitation


SNOMED:  639506952


(5) Hypertension


ICD Codes:  I10 - Essential (primary) hypertension


SNOMED:  70557507


Qualifiers:  


   Qualified Codes:  I10 - Essential (primary) hypertension


(6) History of CVA (cerebrovascular accident)


ICD Codes:  Z86.73 - Personal history of transient ischemic attack (TIA), and 

cerebral infarction without residual deficits


SNOMED:  799532603


(7) History of hypertension


ICD Codes:  Z86.79 - Personal history of other diseases of the circulatory 

system


SNOMED:  974594412


Assessment/Plan:


symptomatic treatment


monitor BP


pt/ot


increase hydralazine


clonidine prn


dc planning soon.











Kolton Tristan MD Sep 9, 2019 13:49

## 2019-09-09 NOTE — NUR
ED Nurse Note:

Report given to LILIANA Antonio. Pt will admit room 406-2, belongings and skin 
condition report to floor RN.

## 2019-09-10 NOTE — PULMONOLOGY PROGRESS NOTE
Assessment/Plan


Problems:  


(1) Renal failure (ARF), acute on chronic


(2) Anemia


(3) Peripheral neuropathy


(4) Agitation


(5) Hypertension


(6) History of CVA (cerebrovascular accident)


(7) History of hypertension


Assessment/Plan


pt is asymptomatic


f/u PT/OT recommendations


neuro evaluation


check electrolytes


renal US pending


dc planning in 1-2 days.





Subjective


ROS Limited/Unobtainable:  No


Interval Events:  pt is asymptomatic


Constitutional:  Reports: no symptoms


HEENT:  Repors: no symptoms


Respiratory:  Reports: no symptoms


Allergies:  


Coded Allergies:  


     No Known Allergies (Unverified , 8/9/19)





Objective





Last 24 Hour Vital Signs








  Date Time  Temp Pulse Resp B/P (MAP) Pulse Ox O2 Delivery O2 Flow Rate FiO2


 


9/10/19 09:00  68  155/62    


 


9/10/19 09:00      Room Air  


 


9/10/19 08:00 98.2 68 18 155/62 (93) 97   


 


9/10/19 05:43    132/67    


 


9/10/19 04:00 98.2 63 18 147/54 (85) 96   


 


9/10/19 00:00 97.7 71 18 156/70 (98) 97   


 


9/9/19 22:01    160/75    


 


9/9/19 21:00      Room Air  


 


9/9/19 20:00 96.8 67 20 150/69 (96) 98   


 


9/9/19 16:00 98.6 72 18 145/55 (85) 98   


 


9/9/19 14:00    161/64    


 


9/9/19 13:10    161/64    


 


9/9/19 12:00 97.7 68 18 161/64 (96) 98   

















Intake and Output  


 


 9/9/19 9/10/19





 19:00 07:00


 


Intake Total 820 ml 120 ml


 


Output Total 600 ml 500 ml


 


Balance 220 ml -380 ml


 


  


 


Intake Oral 820 ml 120 ml


 


Output Urine Total 600 ml 500 ml


 


# Bowel Movements 2 








General Appearance:  WD/WN


HEENT:  normocephalic, atraumatic


Respiratory/Chest:  chest wall non-tender, lungs clear


Cardiovascular:  normal peripheral pulses, normal rate, no JVD


Abdomen:  soft, non tender, non distended


Genitourinary:  normal external genitalia


Extremities:  no clubbing


Skin:  no lesions, no ulcers


Neurologic/Psychiatric:  CNs II-XII grossly normal


Lymphatic:  no neck adenopathy





Microbiology








 Date/Time


Source Procedure


Growth Status


 


 


 9/8/19 22:00


Rectum  Received








Laboratory Tests


9/9/19 14:30: 


Urine Color Yellow, Urine Appearance Clear, Urine pH 5, Urine Specific Gravity 

1.015, Urine Protein Negative, Urine Glucose (UA) Negative, Urine Ketones 

Negative, Urine Blood Negative, Urine Nitrite Negative, Urine Bilirubin Negative

, Urine Urobilinogen Normal, Urine Leukocyte Esterase Negative, Urine RBC 0-2H, 

Urine WBC 0-2, Urine Squamous Epithelial Cells None, Urine Bacteria Occasional, 

Urine Eosinophils None seen, Urine Osmolality 615H, Urine Random Creatinine [

Pending], Urine Random Microalbumin [Pending], Urine Random Sodium 113H, Urine 

Microalbumin/Creatinine Ratio [Pending]


9/9/19 15:30: 


Uric Acid 6.5, Total Creatine Kinase 181


9/10/19 05:50: 


White Blood Count 6.8, Red Blood Count 3.63L, Hemoglobin 9.8L, Hematocrit 30.7L

, Mean Corpuscular Volume 85, Mean Corpuscular Hemoglobin 26.9L, Mean 

Corpuscular Hemoglobin Concent 31.8L, Red Cell Distribution Width 13.3, 

Platelet Count 182, Mean Platelet Volume 6.2L, Neutrophils (%) (Auto) , 

Lymphocytes (%) (Auto) , Monocytes (%) (Auto) , Eosinophils (%) (Auto) , 

Basophils (%) (Auto) , Differential Total Cells Counted 100, Neutrophils % (

Manual) 66, Lymphocytes % (Manual) 25, Monocytes % (Manual) 5, Eosinophils % (

Manual) 4H, Basophils % (Manual) 0, Band Neutrophils 0, Platelet Estimate 

Adequate, Platelet Morphology Normal, Red Blood Cell Morphology Normal, Sodium 

Level 142, Potassium Level 4.1, Chloride Level 110H, Carbon Dioxide Level 24, 

Anion Gap 8, Blood Urea Nitrogen 31H, Creatinine 1.8H, Estimat Glomerular 

Filtration Rate , Glucose Level 81, Calcium Level 8.4L





Current Medications








 Medications


  (Trade)  Dose


 Ordered  Sig/Arlen


 Route


 PRN Reason  Start Time


 Stop Time Status Last Admin


Dose Admin


 


 Amlodipine


 Besylate


  (Norvasc)  10 mg  DAILY


 ORAL


   9/9/19 09:00


 10/9/19 08:59  9/9/19 08:35


 


 


 Clonidine HCl


  (Catapres Tab)  0.1 mg  Q4H  PRN


 ORAL


 For High Blood Pressure  9/9/19 13:45


 10/9/19 13:44   


 


 


 Hydralazine HCl


  (Apresoline)  100 mg  Q8HR


 ORAL


   9/9/19 14:00


 10/9/19 05:59  9/10/19 05:43


 


 


 Influenza Virus


 Vacc Trival Recomb


  (Flu Vac


 High-Dose  for


 Pts 65 Years and


 Older)  0.5 ml  ONCE ONCE


 IM


   9/9/19 09:00


 9/9/19 09:01 UNV  


 


 


 Pneumococcal


 Polyvalent Vaccine


  (Pneumovax)  0.5 ml  ONCE ONCE


 IM


   9/9/19 09:00


 9/9/19 09:01 UNV  


 


 


 Risperidone


  (RisperDAL)  1 mg  BEDTIME


 ORAL


   9/10/19 21:00


 10/10/19 20:59   


 


 


 Risperidone


  (RisperDAL)  1 mg  EVERY 6 HOURS  PRN


 ORAL


 agitation  9/9/19 22:30


 10/9/19 22:29   


 

















Kolton Tristan MD Sep 10, 2019 11:28

## 2019-09-10 NOTE — NUR
SI:    AMS,RENAL FAILURE

T. 97.3 HR 69 RR 18 B/P 148/64

BUN 31 CR 1.8







IS:    RISPERDAL PO

APRESOLINE PO

RENAL US

*******MED/SURG STATUS******

## 2019-09-10 NOTE — CONSULTATION
DATE OF CONSULTATION:  09/10/2019

NEUROLOGIC CONSULTATION



CONSULTING PHYSICIAN:  Beny Duenas M.D.



CHIEF COMPLAINT:  This is the second [facility] admission for this

85-year-old right-handed  man with previous history of

hypertension, cerebrovascular accident treated in [place] 3 years ago

with left-sided weakness, and paranoia on Risperdal.  I was asked to see

the patient because of the chief complaint of numbness in his hands and

feet and face beginning 2 days ago.



The patient has the above history.  He developed numbness in his hands

and feet and face 2 days ago.  However, by the time he got to the

emergency room, apparently it disappeared.  He is admitted to the

hospital.  He had a vascular study, which was fairly unremarkable.  He is

mildly anemic.  His BUN and creatinine were elevated.  His glucose is 110.

Urinalysis is unremarkable.  The patient was admitted and started on

aspirin.  He received clonidine (Catapres), amlodipine, hydralazine.  He

then went for a CT scan of the brain revealed right frontal

encephalomalacia.  Also had an MRI scan of the brain, which revealed

cerebral atrophy and a cortical susceptibility artifact in the posterior

left parietal lobe on the GRE images most likely represents an area of

prior petechial cortical hemorrhage.  He had cerebral atrophy.  An

ultrasound was also done.  He had bilateral renal cysts, distended

bladder, and echogenic kidneys.  The patient was supposed to be seen by a

psychiatrist.  No family history of neurologic disease.



PAST MEDICAL HISTORY/PAST MEDICAL ILLNESSES:

1. Appendicitis with an appendectomy.

2. Cerebrovascular disease.  See above.

3. Hypertension.  See above.

4. BPH.

5. Possible paranoid schizophrenia.

6. Renal disease, etiology unknown.



ALLERGIES:  He is allergic to Excedrin.



SOCIAL HISTORY:  He is .  Has 2 children, in good health.  He is

retired.



FAMILY HISTORY:  His father  of "occupational injuries."  His mother

 of old age.



REVIEW OF SYSTEMS:  His appetite is decreased.  He lost about 10 pounds,

going from 150 to 140 pounds.  The rest of the review of systems is

noncontributory.



PHYSICAL EXAMINATION:

GENERAL:  Revealed a well-developed, thin man, lying in bed, in no acute

distress.  The patient has significant hearing loss.

VITAL SIGNS:  Blood pressure is 148/64, pulse is 69 and regular,

temperature is 97.3 degrees.

HEENT:  He has bilateral arcus senilis, cataracts, and has no

teeth.

NECK:  He has limitation of motion.  No tenderness.  Carotids are +1.  No

bruits appreciated.

LUNGS:  Decreased lung sounds.

CARDIOVASCULAR:  PMI increased.  JVP is not visualized.  The patient had a

normal S1.  The S2 is physiologically split.  There was no S3, S4,

murmurs, or rubs appreciated.

ABDOMEN:  Scaphoid.  Bowel sounds intact.  No tenderness, masses, or

organomegaly.

BACK:  Normal.

EXTREMITIES:  He had evidence of degenerative joint disease of the fingers.

He also had stitches on the right fourth finger for previous

laceration.

NEUROLOGIC:

MENTAL STATUS:  Judgment could not be tested.  Affect is appropriate.

Memory, his past memory was intact to date of birth.  Immediate recall was

3/3 words.  Recent recall is 3/3 words.  Intellect, similarities were

concrete i.e. train and bicycle nothing in common, cat and dog they both

bark.  Orientation, time and year - he knew it was 09/10/2019 Tuesday.

Place - he knew he in [facility].  He was oriented to person.

Language function, spoken speech was fluent without paraphasias.  There

was mild right or left confusion.  He currently had trouble subtracting 19

from 36.  Could not give me an answer.  He could spell world backwards and

forwards without too much difficulty.  Cranial nerves II through XII were

in fact except for the following:

CRANIAL NERVES III, IV, AND VI:  He had decreased conjugate upgaze.  Pupils

are approximately 3.5 mm, round, light reactive.

CRANIAL NERVE VII:  There is decreased smile on the left.

CRANIAL NERVE VIII:  He has significantly decreased auditory acuity

bilaterally.

MUSCLE EXAMINATION:  Muscle bulk is symmetrically decreased.  Tone reveals

paratonia in the lower extremities and upper extremities.  Strength is 5/5

on the right side, 5-/5 on the left side.

REFLEXES:   +2 in the left upper extremity, +1 in the right upper

extremity, +2.5 left knee, +2 right knee pain, 0 right ankle, +1 left

ankle.  Toes are downgoing bilaterally.

COORDINATION:  Finger-to-nose, rapid alternating movements were intact.

Heel-to-shin testing was basically normal, even on the left.

GAIT AND STATION:  He had a normal based gait.  Romberg negative.  He could

not heel-to-toe walk.

SENSORY EXAMINATION:  Sensation was subjectively intact to pinprick,

proprioception, vibration, fine touch.



IMPRESSION:  The patient may have posterior circulation disease.  That is

actually difficult to make a diagnosis on him.  In any event, I would

treat him with aspirin and Plavix.  He should only be on Plavix for 3

months and then it should be stopped.  Again, localization is difficult to

make in this patient.



PLAN:

1. I will speak to you about this case.

2. Add Plavix 75 mg.



Thank you for this interesting case.









  ______________________________________________

  Beny Duenas MD





DR:  DESTINY

D:  09/10/2019 16:58

T:  09/10/2019 17:53

JOB#:  [id number]

CC:

## 2019-09-10 NOTE — DIAGNOSTIC IMAGING REPORT
Indication: Abnormal renal function tests

 

Technique: Grayscale and duplex images of the kidneys, retroperitoneum, and bladder

were obtained.

 

Comparison: 8/9/2019            

 

Findings: Right kidney measures 9.2 cm in length. Left kidney measures 9.4 cm in

length. Both kidneys demonstrate increased echogenicity.. No hydronephrosis. There is

a prominent extrarenal pelvis on the right.  There are multiple right renal cysts. Is

also small left renal cyst.. Normal inferior vena cava. Bladder is distended,

calculated volume 389 mL. Patient reportedly has a condom catheter

 

Impression: Negative for hydronephrosis

 

Bilateral echogenic kidneys, consistent with medical renal disease

 

Bilateral renal cysts

 

Distended bladder.

## 2019-09-10 NOTE — INTERNAL MED PROGRESS NOTE
Subjective


Date of Service:  Sep 10, 2019


Physician Name


Piero Harman


Attending Physician


Frandy Vuong MD





Current Medications








 Medications


  (Trade)  Dose


 Ordered  Sig/Arlen


 Route


 PRN Reason  Start Time


 Stop Time Status Last Admin


Dose Admin


 


 Amlodipine


 Besylate


  (Norvasc)  10 mg  DAILY


 ORAL


   9/9/19 09:00


 10/9/19 08:59  9/9/19 08:35


 


 


 Clonidine HCl


  (Catapres Tab)  0.1 mg  Q4H  PRN


 ORAL


 For High Blood Pressure  9/9/19 13:45


 10/9/19 13:44   


 


 


 Clopidogrel


 Bisulfate


  (Plavix)  75 mg  ONCE


 ORAL


   9/10/19 16:59


 9/10/19 18:00  9/10/19 17:23


 


 


 Hydralazine HCl


  (Apresoline)  100 mg  Q8HR


 ORAL


   9/9/19 14:00


 10/9/19 05:59  9/10/19 14:24


 


 


 Influenza Virus


 Vacc Trival Recomb


  (Flu Vac


 High-Dose  for


 Pts 65 Years and


 Older)  0.5 ml  ONCE ONCE


 IM


   9/9/19 09:00


 9/9/19 09:01 UNV  


 


 


 Pneumococcal


 Polyvalent Vaccine


  (Pneumovax)  0.5 ml  ONCE ONCE


 IM


   9/9/19 09:00


 9/9/19 09:01 UNV  


 


 


 Risperidone


  (RisperDAL)  1 mg  BEDTIME


 ORAL


   9/10/19 21:00


 10/10/19 20:59   


 


 


 Risperidone


  (RisperDAL)  1 mg  EVERY 6 HOURS  PRN


 ORAL


 agitation  9/9/19 22:30


 10/9/19 22:29   


 








Allergies:  


Coded Allergies:  


     No Known Allergies (Unverified , 8/9/19)


ROS Limited/Unobtainable:  Yes


Subjective


86 YO M admitted with altered mental status.  Now paranoia and agitation. Cover 

for Int Med-Dr Vuong





Objective





Last Vital Signs








  Date Time  Temp Pulse Resp B/P (MAP) Pulse Ox O2 Delivery O2 Flow Rate FiO2


 


9/10/19 16:00 97.4 87 18 138/87 (104) 97   


 


9/10/19 09:00      Room Air  











Laboratory Tests








Test


  9/10/19


05:50


 


White Blood Count


  6.8 K/UL


(4.8-10.8)


 


Red Blood Count


  3.63 M/UL


(4.70-6.10)  L


 


Hemoglobin


  9.8 G/DL


(14.2-18.0)  L


 


Hematocrit


  30.7 %


(42.0-52.0)  L


 


Mean Corpuscular Volume 85 FL (80-99)  


 


Mean Corpuscular Hemoglobin


  26.9 PG


(27.0-31.0)  L


 


Mean Corpuscular Hemoglobin


Concent 31.8 G/DL


(32.0-36.0)  L


 


Red Cell Distribution Width


  13.3 %


(11.6-14.8)


 


Platelet Count


  182 K/UL


(150-450)


 


Mean Platelet Volume


  6.2 FL


(6.5-10.1)  L


 


Neutrophils (%) (Auto)


  % (45.0-75.0)


 


 


Lymphocytes (%) (Auto)


  % (20.0-45.0)


 


 


Monocytes (%) (Auto)  % (1.0-10.0)  


 


Eosinophils (%) (Auto)  % (0.0-3.0)  


 


Basophils (%) (Auto)  % (0.0-2.0)  


 


Differential Total Cells


Counted 100  


 


 


Neutrophils % (Manual) 66 % (45-75)  


 


Lymphocytes % (Manual) 25 % (20-45)  


 


Monocytes % (Manual) 5 % (1-10)  


 


Eosinophils % (Manual) 4 % (0-3)  H


 


Basophils % (Manual) 0 % (0-2)  


 


Band Neutrophils 0 % (0-8)  


 


Platelet Estimate Adequate  


 


Platelet Morphology Normal  


 


Red Blood Cell Morphology Normal  


 


Sodium Level


  142 MMOL/L


(136-145)


 


Potassium Level


  4.1 MMOL/L


(3.5-5.1)


 


Chloride Level


  110 MMOL/L


()  H


 


Carbon Dioxide Level


  24 MMOL/L


(21-32)


 


Anion Gap


  8 mmol/L


(5-15)


 


Blood Urea Nitrogen


  31 mg/dL


(7-18)  H


 


Creatinine


  1.8 MG/DL


(0.55-1.30)  H


 


Estimat Glomerular Filtration


Rate  mL/min (>60)  


 


 


Glucose Level


  81 MG/DL


()


 


Calcium Level


  8.4 MG/DL


(8.5-10.1)  L











Microbiology








 Date/Time


Source Procedure


Growth Status


 


 


 9/8/19 22:00


Rectum  Received

















Intake and Output  


 


 9/9/19 9/10/19





 19:00 07:00


 


Intake Total 820 ml 120 ml


 


Output Total 600 ml 500 ml


 


Balance 220 ml -380 ml


 


  


 


Intake Oral 820 ml 120 ml


 


Output Urine Total 600 ml 500 ml


 


# Bowel Movements 2 








Objective


PHYSICAL EXAMINATION:


GENERAL:  The patient is a well-developed and well nourished thin-appearing


 male, in no apparent distress.


HEENT:  Eyes, pupils are equal and responsive to light and accommodation.


Extraocular movements are intact.


NECK:  Supple without lymphadenopathy.


CHEST:  Lungs are clear to auscultation bilaterally without wheezes or


rales.


CARDIOVASCULAR:  Regular rhythm and rate.  S1 and S2 are normal without


murmurs, rubs, or gallops.


ABDOMEN:  Soft, nontender, and nondistended.  Positive bowel sounds.  No


evidence of hepatosplenomegaly.  Currently, no rebound or guarding


noted.


EXTREMITIES:  Negative for clubbing, cyanosis, or edema.


RECTAL/GENITAL:  Not performed.


NEUROLOGIC:  Cranial nerves II through XII are grossly intact without focal


deficits.  Motor strength is 3/5 on the left and 5/5 on the right.





Assessment/Plan


Assessment/Plan


ASSESSMENT:  This is an 85-year-old  male.





1. Altered mental status.


2. Paranoia.


3. Cerebrovascular disease.


4. Left hemiparesis.


5. Hypertension.





TREATMENT:


1. Altered mental status.  This may be metabolic encephalopathy versus


acute psychosis.  A Psychiatric consultation has been obtained with Dr. Randall.  We will follow recommendation of Psychiatry.


2. Cerebrovascular disease.  The patient has been started empirically on


aspirin.


3. Left hemiparesis.


4. Hypertension.  Continue Norvasc as above.











Piero Harman MD Sep 10, 2019 17:29

## 2019-09-10 NOTE — NUR
NURSE NOTES:

Received report from LILIANA Vincent. Patient asleep. Breathing unlabored without distress, 
discomfort, or sob. Denies pain at this time. IV noted on left hand 20g intact, dry, clean, 
and patent. Walker at the bedside. Bed placed at the lowest with alarm, brake, and siderails 
up for safety. Call light placed within reach. Will continue to monitor and provide care as 
ordered.

## 2019-09-10 NOTE — NUR
P.T Note:

Pt refused to participate in P.T evaluation. Pt became irritable upon further attempt.  Will 
reattempt with pt is cooperative. RN notified/aware.

## 2019-09-10 NOTE — NUR
NURSE NOTES:

Received pt in bed, sleeping. Room air. No s/s of distress/pain. IV on L hand 20g intact and 
patent, with saline lock. Side rails x2. Bed in the lowest and locked. Call light within 
reach. Will continue to monitor

## 2019-09-10 NOTE — DIAGNOSTIC IMAGING REPORT
Indication: Syncope, altered mental status

 

Technique: sagittal T1 fast spin echo, axial T1 FLAIR, axial T2 FLAIR, axial T2 FS

PROPELLER, axial T2* GRE, axial diffusion weighted images. ADC and exponential ADC

maps generated

 

Comparison: Reference made to head CT dated 9/8/2019. No comparison MRIs

 

Findings:There some image degradation due to motion artifact. There is an area of

cortical susceptibility artifact in the posterior left parietal lobe on the GRE

images. No definite corresponding signal abnormalities are seen on any of the other

sequences  No abnormal areas of restricted diffusion to suggest acute infarction.

Frontal encephalomalacia on the right seen on recent CT scan is less evident on MRI .

No acute hemorrhage or edema. No mass effect nor midline shift. There is age-related

enlargement of the ventricles and extra-axial CSF spaces. The vascular flow voids are

preserved. There is periventricular deep white matter high T2 signal which is

asymmetrically abundant on the right. Visualized orbits and sinuses are unremarkable.

 

Impression: Negative for acute intracranial bleed, mass effect, or infarct

 

Cortical susceptibility artifact in the posterior left parietal lobe on the GRE

images, without corresponding signal abnormality on other sequences. Most likely

represents an area of prior petechial cortical hemorrhage

 

Age-related volume loss

 

Periventricular deep white matter high T2 signal, consistent with chronic

microvascular ischemic changes

## 2019-09-10 NOTE — CONSULTATION
DATE OF CONSULTATION:  09/10/2019

CONSULTING PHYSICIAN:  Libby Randall M.D.



HISTORY OF PRESENT ILLNESS:  The patient is an 85-year-old male with

history of hypertension, CVA, psychotic disorder, hypertension,

appendicitis with an appendectomy who was admitted to the hospital for

medical stabilization.  The patient has paranoid ideation.  I started the

patient on Risperdal last night.  The patient is hard of hearing and gets

anxious.  He was able to provide history and has memory impairment.



PAST PSYCHIATRIC HISTORY:  He denies any psychiatric history.



PAST MEDICAL HISTORY:  Hypertension, pneumonia, BPH, CVA, anemia, renal

failure.



ALLERGIES:  No known drug allergies.



SUBSTANCE ABUSE HISTORY:  No known history of illicit drug use or

alcohol.



MENTAL STATUS EXAMINATION:  The patient is alert, oriented times self,

place, and situation.  Mood is anxious to neutral.  Affect is constricted,

congruent with mood.  Thought process is linear and goal oriented.

Thought content, no suicidal or homicidal ideations.  Positive for

paranoid ideation.  Insight and judgment is fair.



ASSESSMENT:

Axis I  Anxiety disorder.

Axis II  Deferred.

Axis III  As above.

Axis IV  Low to moderate.

Axis V  50.



PLAN:

1. Encourage the patient to participate in physical therapy and care.

2. Continue risperidone.

3. Provide the patient reality orientation and supportive therapy.









  ______________________________________________

  Libby Randall M.D.





DR:  BETTY

D:  09/10/2019 22:16

T:  09/10/2019 22:25

JOB#:  [id number]

CC:

## 2019-09-11 NOTE — DIAGNOSTIC IMAGING REPORT
--------------- APPROVED REPORT --------------





CPT Code: 94734



Vascular Symptoms

Comments: AMS



Doppler Spectral Velocity Analysis

RightLeft







arteries. The Doppler spectral flow analysis indicates the degree of stenosis is minimal 

in the common, internal and external carotid arteries. 



VERTEBRAL/SUBCLAVIAN- The vertebral and subclavian arteries are within normal limits, 

bilaterally.

## 2019-09-11 NOTE — NUR
NURSE NOTES:

Received pt in bed, sleeping. Room air. No s/s of distress/pain. IV 20g on L hand 20g intact 
and patent, with saline lock. Side rails x2. Bed in the lowest, locked, and alarm on. Call 
light within reach. Will continue to monitor

## 2019-09-11 NOTE — NUR
NURSE NOTES:

Patient states that he lost his pants and his wallet was in the pants. RN looked through his 
belongings with the patient and there was no pants/wallet. Checked the belongings list and 
he did not have them when he came to the floor.

## 2019-09-11 NOTE — NUR
HAND-OFF: 

Report given to LILIANA Vincent. Endorsed about the missing belongings. Informed RN that patient 
did not allow searching through the belongings. AM RN will follow up with missing 
belongings.

## 2019-09-11 NOTE — NUR
P.T NOTE:

P.T EVALUATION COMPLETED. BASED ON P.T EVALUATION, PATIENT IS FUNCTIONING INDEPENDENTLY AT 
HIS BASELINE THEREFORE SKILLED  P.T SERVICE IS NO LONGER NEEDED AT THIS TIME. D/C P.T 
SERVICES. THANK YOU FOR THIS REFERRAL.










-------------------------------------------------------------------------------

Addendum: 09/11/19 at 1330 by KUMAR LITTLEJOHN PT

-------------------------------------------------------------------------------

Amended: Links added.

## 2019-09-11 NOTE — NUR
NURSE NOTES:

Patient claims that he is missing wallet and pant. Patient did not allow RN to search 
through the belongings at this time. Just assisted him packing back the jackets. Charge 
nurse made aware and will endorse to the AM RN to follow up with missing belongings.

## 2019-09-11 NOTE — INTERNAL MED PROGRESS NOTE
Subjective


Date of Service:  Sep 11, 2019


Physician Name


Piero Harman


Attending Physician


Frandy Vuong MD





Current Medications








 Medications


  (Trade)  Dose


 Ordered  Sig/Arlen


 Route


 PRN Reason  Start Time


 Stop Time Status Last Admin


Dose Admin


 


 Amlodipine


 Besylate


  (Norvasc)  10 mg  DAILY


 ORAL


   9/9/19 09:00


 10/9/19 08:59  9/11/19 09:05


 


 


 Clonidine HCl


  (Catapres Tab)  0.1 mg  Q4H  PRN


 ORAL


 For High Blood Pressure  9/9/19 13:45


 10/9/19 13:44   


 


 


 Hydralazine HCl


  (Apresoline)  100 mg  Q8HR


 ORAL


   9/9/19 14:00


 10/9/19 05:59  9/11/19 05:51


 


 


 Influenza Virus


 Vacc Trival Recomb


  (Flu Vac


 High-Dose  for


 Pts 65 Years and


 Older)  0.5 ml  ONCE ONCE


 IM


   9/9/19 09:00


 9/9/19 09:01 UNV  


 


 


 Pneumococcal


 Polyvalent Vaccine


  (Pneumovax)  0.5 ml  ONCE ONCE


 IM


   9/9/19 09:00


 9/9/19 09:01 UNV  


 


 


 Risperidone


  (RisperDAL)  1 mg  BEDTIME


 ORAL


   9/10/19 21:00


 10/10/19 20:59  9/10/19 23:04


 


 


 Risperidone


  (RisperDAL)  1 mg  EVERY 6 HOURS  PRN


 ORAL


 agitation  9/9/19 22:30


 10/9/19 22:29   


 








Allergies:  


Coded Allergies:  


     No Known Allergies (Unverified , 8/9/19)


ROS Limited/Unobtainable:  No


Constitutional:  Reports: no symptoms


HEENT:  Reports: no symptoms


Cardiovascular:  Reports: no symptoms


Respiratory:  Reports: no symptoms


Gastrointestinal/Abdominal:  Reports: no symptoms


Genitourinary:  Reports: no symptoms


Neurologic/Psychiatric:  Reports: no symptoms


Subjective


86 YO M admitted with altered mental status.  Now paranoia and agitation. Cover 

for Int Layo-Dr Vuong





Objective





Last Vital Signs








  Date Time  Temp Pulse Resp B/P (MAP) Pulse Ox O2 Delivery O2 Flow Rate FiO2


 


9/11/19 09:05  80  142/65    


 


9/11/19 09:00      Room Air  


 


9/11/19 08:00 98.1  19  99   











Microbiology








 Date/Time


Source Procedure


Growth Status


 


 


 9/8/19 22:00


Nose MRSA Culture - Final


NO METHICILLIN RESISTANT STAPH AUREUS... Complete


 


 9/8/19 22:00


Rectum - Final


NO CARBAPENEM-RESISTANT ENTEROBACTERI... Complete


 


 9/8/19 22:00


Rectum VRE Culture - Final


NO VANCOMYCIN RESISTANT ENTEROCOCCUS ... Complete

















Intake and Output  


 


 9/10/19 9/11/19





 19:00 07:00


 


Intake Total 480 ml 


 


Output Total 400 ml 250 ml


 


Balance 80 ml -250 ml


 


  


 


Intake Oral 480 ml 


 


Output Urine Total 400 ml 250 ml


 


# Voids 2 1








Objective


PHYSICAL EXAMINATION:


GENERAL:  The patient is a well-developed and well nourished thin-appearing


 male, in no apparent distress.


HEENT:  Eyes, pupils are equal and responsive to light and accommodation.


Extraocular movements are intact.


NECK:  Supple without lymphadenopathy.


CHEST:  Lungs are clear to auscultation bilaterally without wheezes or


rales.


CARDIOVASCULAR:  Regular rhythm and rate.  S1 and S2 are normal without


murmurs, rubs, or gallops.


ABDOMEN:  Soft, nontender, and nondistended.  Positive bowel sounds.  No


evidence of hepatosplenomegaly.  Currently, no rebound or guarding


noted.


EXTREMITIES:  Negative for clubbing, cyanosis, or edema.


RECTAL/GENITAL:  Not performed.


NEUROLOGIC:  Cranial nerves II through XII are grossly intact without focal


deficits.  Motor strength is 3/5 on the left and 5/5 on the right.





Assessment/Plan


Assessment/Plan


ASSESSMENT:  This is an 85-year-old  male.





1. Altered mental status.


2. Paranoia.


3. Cerebrovascular disease.


4. Left hemiparesis.


5. Hypertension.





TREATMENT:


1. Altered mental status.  This may be metabolic encephalopathy versus


acute psychosis.  A Psychiatric consultation has been obtained with Dr. Randall.  Continue risperdal per Psychiatry.


2. Cerebrovascular disease.  The patient has been started empirically on


aspirin.


3. Left hemiparesis.


4. Hypertension.  Continue Norvasc as above.











Piero Harman MD Sep 11, 2019 11:39

## 2019-09-12 NOTE — NUR
Social Service Note



Patient is a resident of [facility].  Previous to placement in SNF, patient was 
homeless.  Patient took the greyhound from [place] with no housing plan once arriving in [place].  
Patient doesn't have emergency contact.  Patient completed POLST indicating Full Code, Full 
Treatment, Long term artificial nutrition and doesn't have an advance directive 8/19/19.  
Patient is anticipated to return to SNF today.  Once medically appropriate and doesn't 
require SNF level of care, SNF will assist patient with alternative placement options.

## 2019-09-12 NOTE — PULMONOLOGY PROGRESS NOTE
Assessment/Plan


Problems:  


(1) Renal failure (ARF), acute on chronic


(2) Anemia


(3) Peripheral neuropathy


(4) Agitation


(5) Hypertension


(6) History of CVA (cerebrovascular accident)


(7) History of hypertension


Assessment/Plan


all reviewed


pt is asymptomatic


f/u PT/OT recommendations


neuro evaluation


check electrolytes


dc planning in 1-2 days.


medication reviewed





Subjective


ROS Limited/Unobtainable:  No


Interval Events:  doing better


Allergies:  


Coded Allergies:  


     No Known Allergies (Unverified , 8/9/19)





Objective





Last 24 Hour Vital Signs








  Date Time  Temp Pulse Resp B/P (MAP) Pulse Ox O2 Delivery O2 Flow Rate FiO2


 


9/12/19 12:00 97.8 62 18 135/64 (87) 94   


 


9/12/19 09:08  80  148/78    


 


9/12/19 09:00      Room Air  


 


9/12/19 08:00 97.6 80 19 148/78 (101) 98   


 


9/12/19 05:57    146/74    


 


9/12/19 03:39 97.2 78 20 146/74 (98) 97   


 


9/12/19 00:00 98.2 78 20 157/81 (106) 98   


 


9/11/19 21:20    154/74    


 


9/11/19 21:00      Room Air  


 


9/11/19 20:00 98.2 78 20 154/74 (100) 100   


 


9/11/19 16:00 97.9 85 21 149/63 (91) 97   


 


9/11/19 13:31    138/62    

















Intake and Output  


 


 9/11/19 9/12/19





 19:00 07:00


 


Intake Total 520 ml 


 


Output Total 850 ml 


 


Balance -330 ml 


 


  


 


Intake Oral 520 ml 


 


Output Urine Total 850 ml 


 


# Voids  3


 


# Bowel Movements  1








General Appearance:  no acute distress


HEENT:  normocephalic, atraumatic


Respiratory/Chest:  chest wall non-tender, lungs clear


Cardiovascular:  normal peripheral pulses, normal rate


Abdomen:  normal bowel sounds, soft, non tender


Genitourinary:  normal external genitalia


Extremities:  no clubbing


Skin:  no rash


Laboratory Tests


9/12/19 06:00: 


White Blood Count 7.2, Red Blood Count 3.84L, Hemoglobin 10.3L, Hematocrit 32.6L

, Mean Corpuscular Volume 85, Mean Corpuscular Hemoglobin 26.9L, Mean 

Corpuscular Hemoglobin Concent 31.7L, Red Cell Distribution Width 13.3, 

Platelet Count 190, Mean Platelet Volume 6.0L, Neutrophils (%) (Auto) 64.9, 

Lymphocytes (%) (Auto) 21.3, Monocytes (%) (Auto) 9.7, Eosinophils (%) (Auto) 

3.2H, Basophils (%) (Auto) 0.9, Sodium Level 147H, Potassium Level 4.1, 

Chloride Level 113H, Carbon Dioxide Level 23, Anion Gap 11, Blood Urea Nitrogen 

32H, Creatinine 1.8H, Estimat Glomerular Filtration Rate , Glucose Level 88, 

Calcium Level 8.9





Current Medications








 Medications


  (Trade)  Dose


 Ordered  Sig/Arlen


 Route


 PRN Reason  Start Time


 Stop Time Status Last Admin


Dose Admin


 


 Amlodipine


 Besylate


  (Norvasc)  10 mg  DAILY


 ORAL


   9/9/19 09:00


 10/9/19 08:59  9/12/19 09:08


 


 


 Clonidine HCl


  (Catapres Tab)  0.1 mg  Q4H  PRN


 ORAL


 For High Blood Pressure  9/9/19 13:45


 10/9/19 13:44   


 


 


 Hydralazine HCl


  (Apresoline)  100 mg  Q8HR


 ORAL


   9/9/19 14:00


 10/9/19 05:59  9/12/19 05:57


 


 


 Influenza Virus


 Vacc Trival Recomb


  (Flu Vac


 High-Dose  for


 Pts 65 Years and


 Older)  0.5 ml  ONCE ONCE


 IM


   9/9/19 09:00


 9/9/19 09:01 UNV  


 


 


 Pneumococcal


 Polyvalent Vaccine


  (Pneumovax)  0.5 ml  ONCE ONCE


 IM


   9/9/19 09:00


 9/9/19 09:01 UNV  


 


 


 Risperidone


  (RisperDAL)  1 mg  EVERY 6 HOURS  PRN


 ORAL


 agitation  9/9/19 22:30


 10/9/19 22:29   


 


 


 Risperidone


  (RisperDAL)  1.5 mg  BEDTIME


 ORAL


   9/12/19 21:00


 10/12/19 20:59   


 

















Kolton Tristan MD Sep 12, 2019 12:43

## 2019-09-12 NOTE — PULMONOLOGY PROGRESS NOTE
Assessment/Plan


Problems:  


(1) Renal failure (ARF), acute on chronic


(2) Anemia


(3) Peripheral neuropathy


(4) Agitation


(5) Hypertension


(6) History of CVA (cerebrovascular accident)


(7) History of hypertension


Assessment/Plan


all reviewed


pt is asymptomatic


f/u PT/OT recommendations


neuro evaluation


check electrolytes


dc planning in 1-2 days.


medication reviewed





Subjective


ROS Limited/Unobtainable:  No


Interval Events:  late note for 0/11


Constitutional:  Reports: no symptoms


HEENT:  Repors: no symptoms


Respiratory:  Reports: no symptoms


Allergies:  


Coded Allergies:  


     No Known Allergies (Unverified , 8/9/19)





Objective





Last 24 Hour Vital Signs








  Date Time  Temp Pulse Resp B/P (MAP) Pulse Ox O2 Delivery O2 Flow Rate FiO2


 


9/12/19 12:00 97.8 62 18 135/64 (87) 94   


 


9/12/19 09:08  80  148/78    


 


9/12/19 09:00      Room Air  


 


9/12/19 08:00 97.6 80 19 148/78 (101) 98   


 


9/12/19 05:57    146/74    


 


9/12/19 03:39 97.2 78 20 146/74 (98) 97   


 


9/12/19 00:00 98.2 78 20 157/81 (106) 98   


 


9/11/19 21:20    154/74    


 


9/11/19 21:00      Room Air  


 


9/11/19 20:00 98.2 78 20 154/74 (100) 100   


 


9/11/19 16:00 97.9 85 21 149/63 (91) 97   


 


9/11/19 13:31    138/62    

















Intake and Output  


 


 9/11/19 9/12/19





 19:00 07:00


 


Intake Total 520 ml 


 


Output Total 850 ml 


 


Balance -330 ml 


 


  


 


Intake Oral 520 ml 


 


Output Urine Total 850 ml 


 


# Voids  3


 


# Bowel Movements  1








General Appearance:  WD/WN, no acute distress


HEENT:  normocephalic, atraumatic


Respiratory/Chest:  chest wall non-tender, lungs clear


Cardiovascular:  normal peripheral pulses, normal rate


Abdomen:  soft, non tender


Extremities:  no cyanosis


Skin:  no rash


Laboratory Tests


9/12/19 06:00: 


White Blood Count 7.2, Red Blood Count 3.84L, Hemoglobin 10.3L, Hematocrit 32.6L

, Mean Corpuscular Volume 85, Mean Corpuscular Hemoglobin 26.9L, Mean 

Corpuscular Hemoglobin Concent 31.7L, Red Cell Distribution Width 13.3, 

Platelet Count 190, Mean Platelet Volume 6.0L, Neutrophils (%) (Auto) 64.9, 

Lymphocytes (%) (Auto) 21.3, Monocytes (%) (Auto) 9.7, Eosinophils (%) (Auto) 

3.2H, Basophils (%) (Auto) 0.9, Sodium Level 147H, Potassium Level 4.1, 

Chloride Level 113H, Carbon Dioxide Level 23, Anion Gap 11, Blood Urea Nitrogen 

32H, Creatinine 1.8H, Estimat Glomerular Filtration Rate , Glucose Level 88, 

Calcium Level 8.9





Current Medications








 Medications


  (Trade)  Dose


 Ordered  Sig/Arlen


 Route


 PRN Reason  Start Time


 Stop Time Status Last Admin


Dose Admin


 


 Amlodipine


 Besylate


  (Norvasc)  10 mg  DAILY


 ORAL


   9/9/19 09:00


 10/9/19 08:59  9/12/19 09:08


 


 


 Clonidine HCl


  (Catapres Tab)  0.1 mg  Q4H  PRN


 ORAL


 For High Blood Pressure  9/9/19 13:45


 10/9/19 13:44   


 


 


 Hydralazine HCl


  (Apresoline)  100 mg  Q8HR


 ORAL


   9/9/19 14:00


 10/9/19 05:59  9/12/19 05:57


 


 


 Influenza Virus


 Vacc Trival Recomb


  (Flu Vac


 High-Dose  for


 Pts 65 Years and


 Older)  0.5 ml  ONCE ONCE


 IM


   9/9/19 09:00


 9/9/19 09:01 UNV  


 


 


 Pneumococcal


 Polyvalent Vaccine


  (Pneumovax)  0.5 ml  ONCE ONCE


 IM


   9/9/19 09:00


 9/9/19 09:01 UNV  


 


 


 Risperidone


  (RisperDAL)  1 mg  EVERY 6 HOURS  PRN


 ORAL


 agitation  9/9/19 22:30


 10/9/19 22:29   


 


 


 Risperidone


  (RisperDAL)  1.5 mg  BEDTIME


 ORAL


   9/12/19 21:00


 10/12/19 20:59   


 

















Kolton Tristan MD Sep 12, 2019 12:42

## 2019-09-12 NOTE — NUR
RD ASSESSMENT & RECOMMENDATIONS

SEE CARE ACTIVITY FOR COMPLETE ASSESSMENT



DAILY ESTIMATED NEEDS:

Needs based on Cardiac 63kg 

25-30  kcals/kg 

0288-9339  total kcals

1-1.3  g protein/kg

63-82  g total protein 

25-30  mL/kg

7424-6197  total fluid mLs



NUTRITION DIAGNOSIS:

* Decreased sodium needs r/t cardiac history, ARF as evidenced by h/o CVA,

elev BPs, elev creat (1.8)





CURRENT DIET:CARDIAC    



 



PO DIET RECOMMENDATIONS:

LOW NA/ texture as tolerated  



 



ADDITIONAL RECOMMENDATIONS:

1) Standing weight for accuraet CBW 

2) Monitor tolerance to current texture, need to downgrade 

        -> edentulous, h/o CVA/ pt denies any chewing/swallowing deficits at this time 

3) Ensure Enlive 1 bottle daily (350kcal/20g prot) w/ variable PO intake

## 2019-09-12 NOTE — PROGRESS NOTE
DATE:  09/12/2019

SUBJECTIVE:  The patient is very hard of hearing.  He is paranoid and

stated that he does not like to go back to . He would like to stay

here in the hospital.  He stated that he will refuse to go back to his

facility.  The patient is still paranoid, believes that the staff will

hurt him.  He is forgetful.



MENTAL STATUS EXAMINATION:  The patient is alert, and oriented times self,

place, and poor insight into the situation he has.  Mood is irritable.

Affect is constricted, congruent with mood.  Thought process is concrete.

Thought content, no suicidal or homicidal ideations.



ASSESSMENT:  Paranoid ideation, dementia.



PLAN:

1. We will continue the risperidone.

2. Encourage him to go back to his facility.

3. Provide him with reality orientation.









  ______________________________________________

  Libby Randall M.D.





DR:  BETTY

D:  09/12/2019 23:01

T:  09/12/2019 23:12

JOB#:  [id number]

CC:



EDITH

## 2019-09-12 NOTE — PROGRESS NOTE
DATE:  09/11/2019

SUBJECTIVE:  The patient is in bed, hard of hearing, _____  very loud.  The

patient primarily believes that nurses are trying to hurt him.  He is also

easily agitated, has poor cognition.



MENTAL STATUS EXAMINATION:  The patient is alert and oriented times self,

place.  He has poor insight about the situation he is in. Mood is anxious.

Affect is flat.  Thought process is concrete.  Thought content, no

suicidal or homicidal ideation.



ASSESSMENT:

1. Cognitive impairment.

2. Psychotic disorder.



PLAN:

1. Increase the risperidone to 1.5 mg p.o. bedtime.

2. We will continue to follow the patient and readjust the

medications.









  ______________________________________________

  Libby Randall M.D.





DR:  Mendel

D:  09/11/2019 23:12

T:  09/12/2019 00:06

JOB#:  [id number]

CC:

## 2019-09-12 NOTE — INTERNAL MED PROGRESS NOTE
Subjective


Date of Service:  Sep 12, 2019


Physician Name


Piero Harman


Attending Physician


Frandy Vuong MD





Current Medications








 Medications


  (Trade)  Dose


 Ordered  Sig/Arlen


 Route


 PRN Reason  Start Time


 Stop Time Status Last Admin


Dose Admin


 


 Amlodipine


 Besylate


  (Norvasc)  10 mg  DAILY


 ORAL


   9/9/19 09:00


 10/9/19 08:59  9/12/19 09:08


 


 


 Clonidine HCl


  (Catapres Tab)  0.1 mg  Q4H  PRN


 ORAL


 For High Blood Pressure  9/9/19 13:45


 10/9/19 13:44   


 


 


 Hydralazine HCl


  (Apresoline)  100 mg  Q8HR


 ORAL


   9/9/19 14:00


 10/9/19 05:59  9/12/19 13:04


 


 


 Influenza Virus


 Vacc Trival Recomb


  (Flu Vac


 High-Dose  for


 Pts 65 Years and


 Older)  0.5 ml  ONCE ONCE


 IM


   9/9/19 09:00


 9/9/19 09:01 UNV  


 


 


 Pneumococcal


 Polyvalent Vaccine


  (Pneumovax)  0.5 ml  ONCE ONCE


 IM


   9/9/19 09:00


 9/9/19 09:01 UNV  


 


 


 Risperidone


  (RisperDAL)  1 mg  EVERY 6 HOURS  PRN


 ORAL


 agitation  9/9/19 22:30


 10/9/19 22:29   


 


 


 Risperidone


  (RisperDAL)  1.5 mg  BEDTIME


 ORAL


   9/12/19 21:00


 10/12/19 20:59   


 








Allergies:  


Coded Allergies:  


     No Known Allergies (Unverified , 8/9/19)


ROS Limited/Unobtainable:  Yes


Subjective


84 YO M admitted with altered mental status.  Now paranoia and agitation. Cover 

for Int Med-Dr Vuong





Objective





Last Vital Signs








  Date Time  Temp Pulse Resp B/P (MAP) Pulse Ox O2 Delivery O2 Flow Rate FiO2


 


9/12/19 13:04    135/64    


 


9/12/19 12:00 97.8 62 18  94   


 


9/12/19 09:00      Room Air  











Laboratory Tests








Test


  9/12/19


06:00


 


White Blood Count


  7.2 K/UL


(4.8-10.8)


 


Red Blood Count


  3.84 M/UL


(4.70-6.10)  L


 


Hemoglobin


  10.3 G/DL


(14.2-18.0)  L


 


Hematocrit


  32.6 %


(42.0-52.0)  L


 


Mean Corpuscular Volume 85 FL (80-99)  


 


Mean Corpuscular Hemoglobin


  26.9 PG


(27.0-31.0)  L


 


Mean Corpuscular Hemoglobin


Concent 31.7 G/DL


(32.0-36.0)  L


 


Red Cell Distribution Width


  13.3 %


(11.6-14.8)


 


Platelet Count


  190 K/UL


(150-450)


 


Mean Platelet Volume


  6.0 FL


(6.5-10.1)  L


 


Neutrophils (%) (Auto)


  64.9 %


(45.0-75.0)


 


Lymphocytes (%) (Auto)


  21.3 %


(20.0-45.0)


 


Monocytes (%) (Auto)


  9.7 %


(1.0-10.0)


 


Eosinophils (%) (Auto)


  3.2 %


(0.0-3.0)  H


 


Basophils (%) (Auto)


  0.9 %


(0.0-2.0)


 


Sodium Level


  147 MMOL/L


(136-145)  H


 


Potassium Level


  4.1 MMOL/L


(3.5-5.1)


 


Chloride Level


  113 MMOL/L


()  H


 


Carbon Dioxide Level


  23 MMOL/L


(21-32)


 


Anion Gap


  11 mmol/L


(5-15)


 


Blood Urea Nitrogen


  32 mg/dL


(7-18)  H


 


Creatinine


  1.8 MG/DL


(0.55-1.30)  H


 


Estimat Glomerular Filtration


Rate  mL/min (>60)  


 


 


Glucose Level


  88 MG/DL


()


 


Calcium Level


  8.9 MG/DL


(8.5-10.1)

















Intake and Output  


 


 9/11/19 9/12/19





 19:00 07:00


 


Intake Total 520 ml 


 


Output Total 850 ml 


 


Balance -330 ml 


 


  


 


Intake Oral 520 ml 


 


Output Urine Total 850 ml 


 


# Voids  3


 


# Bowel Movements  1








Objective


PHYSICAL EXAMINATION:


GENERAL:  The patient is a well-developed and well nourished thin-appearing


 male, in no apparent distress.


HEENT:  Eyes, pupils are equal and responsive to light and accommodation.


Extraocular movements are intact.


NECK:  Supple without lymphadenopathy.


CHEST:  Lungs are clear to auscultation bilaterally without wheezes or


rales.


CARDIOVASCULAR:  Regular rhythm and rate.  S1 and S2 are normal without


murmurs, rubs, or gallops.


ABDOMEN:  Soft, nontender, and nondistended.  Positive bowel sounds.  No


evidence of hepatosplenomegaly.  Currently, no rebound or guarding


noted.


EXTREMITIES:  Negative for clubbing, cyanosis, or edema.


RECTAL/GENITAL:  Not performed.


NEUROLOGIC:  Cranial nerves II through XII are grossly intact without focal


deficits.  Motor strength is 3/5 on the left and 5/5 on the right.





Assessment/Plan


Assessment/Plan


ASSESSMENT:  This is an 85-year-old  male.





1. Altered mental status.


2. Paranoia.


3. Cerebrovascular disease.


4. Left hemiparesis.


5. Hypertension.





TREATMENT:


1. Altered mental status.  This may be metabolic encephalopathy versus


acute psychosis.  A Psychiatric consultation has been obtained with Dr. Randall.  Continue risperdal per Psychiatry.


2. Cerebrovascular disease.  The patient has been started empirically on


aspirin.


3. Left hemiparesis.


4. Hypertension.  Continue Norvasc as above.











Piero Harman MD Sep 12, 2019 16:37

## 2019-09-12 NOTE — NUR
DISCHARGE PLANNING



DISCHARGE ORDER NOTED



Patient has been accepted to;



[facility]

[address] [place] [address]



Bed:38-A

Skilled 

[phone] for Nurse to Nurse report



[facility] ETA for transportation: 17:45

## 2019-09-12 NOTE — NUR
NURSE NOTES:

received report from LILIANA Suárez. patient in bed. sleeping. breathing even and unlabored. no 
facial grimacing. IV on LH saline lock intact. bed in the lowest position and locked. call 
light within reach. will continue to provide plan of care.

## 2019-09-12 NOTE — NUR
NURSE NOTES:

spoke to pharmacist macario. high dose of flu vaccine is not available at this time. patient 
refused to get both flu vaccine and peumo vaccine at this time.

## 2019-09-12 NOTE — NUR
NURSE NOTES:

Patient in bed, awake, alert and verbally responsive. Able to make needs known. Patient to 
be discharged tonight. Refusing to sign belongings list, missing debit card, shoes from the 
list. Claims also that his wallet that was tucked inside the pocket of the pants was there. 
At the moment refusing to be discharged. Charge nurse made aware.

## 2019-09-12 NOTE — NUR
NURSE NOTES:

checked  and counted  beloingings with patient. patient missing  shoes.  patient  said  ER 
staffs took off his pant at the ER> no  pants in the check list. RN informed patient RN will 
ask  for investigation tomorrow. patient refused to sign on belonging check 
list and discharge paper.

## 2019-09-13 NOTE — NUR
NURSE NOTES:

RECEIVED PT FROM LILIANA AHUMADA. WAITING FOR AMBULANCE TO ARRIVE. PT IS AWAKE, AAOX1, ON ROOM AIR, 
NO ACUTE DISTRESS NOTED. PT NOTED CALM, AMBULATING INSIDE HIS ROOM. IV ON LEFT HAND 20G IS 
INTACT AND PATENT. CALL LIGHT IS WITHIN REACH. WILL CONTINUE TO MONITOR.

## 2019-09-13 NOTE — INTERNAL MED PROGRESS NOTE
Subjective


Physician Name


Frandy Vuong


Attending Physician


Frandy Vuong MD





Current Medications








 Medications


  (Trade)  Dose


 Ordered  Sig/Arlen


 Route


 PRN Reason  Start Time


 Stop Time Status Last Admin


Dose Admin


 


 Amlodipine


 Besylate


  (Norvasc)  10 mg  DAILY


 ORAL


   9/9/19 09:00


 10/9/19 08:59  9/12/19 09:08


 


 


 Clonidine HCl


  (Catapres Tab)  0.1 mg  Q4H  PRN


 ORAL


 For High Blood Pressure  9/9/19 13:45


 10/9/19 13:44   


 


 


 Hydralazine HCl


  (Apresoline)  100 mg  Q8HR


 ORAL


   9/9/19 14:00


 10/9/19 05:59  9/13/19 06:03


 


 


 Lorazepam


  (Ativan 2mg/ml


 1ml)  2 mg  ONCE


 IM


   9/13/19 16:28


 9/13/19 18:00   


 


 


 Risperidone


  (RisperDAL)  1 mg  EVERY 6 HOURS  PRN


 ORAL


 agitation  9/9/19 22:30


 10/9/19 22:29   


 


 


 Risperidone


  (RisperDAL)  1.5 mg  BEDTIME


 ORAL


   9/12/19 21:00


 10/12/19 20:59  9/12/19 21:02


 








Allergies:  


Coded Allergies:  


     No Known Allergies (Unverified , 8/9/19)


Subjective


Awake, alert, responsive, anxious and combative.





Objective





Last Vital Signs








  Date Time  Temp Pulse Resp B/P (MAP) Pulse Ox O2 Delivery O2 Flow Rate FiO2


 


9/13/19 16:00 98.1 81 18 130/79 (96) 98   


 


9/13/19 09:00      Room Air  

















Intake and Output  


 


 9/12/19 9/13/19





 19:00 07:00


 


Intake Total 820 ml 820 ml


 


Output Total  900 ml


 


Balance 820 ml -80 ml


 


  


 


Intake Oral 820 ml 820 ml


 


Output Urine Total  900 ml


 


# Voids 6 2


 


# Bowel Movements  1








Objective


General: No acute distress, awake and alert


HEENT: NCAT, sclera anicteric, PERRL, EOMI.


Neck: Supple, no significant jugular venous distention, 


Lungs: Fair inspiratory effort,  clear to auscultation bilaterally, no Wheeze 

or Rales.


Heart: Regular rate and rhythm, normal S1/S2, no murmurs.


Abdomen: soft, nontender, nondistended. Normoactive bowel sounds.


 / Rectal: Refused and deferred.


Extremities: No Cyanosis , clubbing or edema. 


Neuro: A&O x 3, Able to move all extremities


Skin: warm, no rash


Psych: Anxiety mood and affect





Assessment/Plan


Assessment/Plan


(1) Acute on chronic renal failure


(2) Anemia


(3) Peripheral neuropathy


(4) dementia with paranoid idealization


(5) Hypertension


(6) History of CVA (cerebrovascular accident)


(7) History of hypertension





Plan:


Follow-up with psychiatry recommendations, Dr. Tonia BLANCHARD planning to SNF.


Monitor laboratory as needed.


DVT prophylaxis with SCD.











Frandy Vuong MD Sep 13, 2019 17:37

## 2019-09-13 NOTE — NUR
NURSE NOTES:

PATIENT REFUSED TO BE DISCHARGE BACK TO [facility]. CALLED AND INFORMED FACILITY. PT IS 
ALSO RESISTIVE TO CARE AND REFUSED ALL MEDICATIONS.  MADE AWARE.

## 2019-09-13 NOTE — NUR
NURSE NOTES:

 Zara could not  have conversation with patient regarding discharge d/t patient 
was aggressive, yelling and agitated. notified Dr. Tristan and received order of Ativan 2mg 
IM once. order noted and carried out.

## 2019-09-13 NOTE — NUR
NURSE NOTES:

patient refused to take meals and fluids. patient still got upset about lost belongings. RN 
offered foods and water. explained risks and benefits. patient refused care. notified charge 
nurse/biju.

## 2019-09-13 NOTE — NUR
NURSE NOTES:

patient refused to take VS and medications.refused to blood lab this morning. RN explained 
risks and benefits to the patient. still refused.

## 2019-09-13 NOTE — PULMONOLOGY PROGRESS NOTE
Assessment/Plan


Problems:  


(1) Renal failure (ARF), acute on chronic


(2) Anemia


(3) Peripheral neuropathy


(4) Agitation


(5) Hypertension


(6) History of CVA (cerebrovascular accident)


(7) History of hypertension


Assessment/Plan


dc planning in progress


pt is asymptomatic


f/u PT/OT recommendations


neuro evaluation


meds evaluated and reviewed


medication reviewed





Subjective


ROS Limited/Unobtainable:  No


Allergies:  


Coded Allergies:  


     No Known Allergies (Unverified , 8/9/19)





Objective





Last 24 Hour Vital Signs








  Date Time  Temp Pulse Resp B/P (MAP) Pulse Ox O2 Delivery O2 Flow Rate FiO2


 


9/13/19 12:00 98.0 79 18 126/83 (97) 97   


 


9/13/19 09:00      Room Air  


 


9/13/19 06:03    157/80    


 


9/13/19 04:00 97.0 102 19 157/80 (105) 98   


 


9/13/19 00:00 97.8 100 19 154/78 (103) 99   


 


9/12/19 21:02    156/79    


 


9/12/19 21:00      Room Air  


 


9/12/19 20:00 98.1 84 19 156/79 (104) 98   


 


9/12/19 16:00 98.1 66 19 130/68 (88) 98   


 


9/12/19 13:04    135/64    

















Intake and Output  


 


 9/12/19 9/13/19





 19:00 07:00


 


Intake Total 820 ml 820 ml


 


Output Total  900 ml


 


Balance 820 ml -80 ml


 


  


 


Intake Oral 820 ml 820 ml


 


Output Urine Total  900 ml


 


# Voids 6 2


 


# Bowel Movements  1








General Appearance:  cachetic


HEENT:  normocephalic, atraumatic


Respiratory/Chest:  chest wall non-tender, lungs clear, normal breath sounds


Cardiovascular:  normal peripheral pulses, normal rate


Abdomen:  normal bowel sounds, soft, non tender, no organomegaly


Extremities:  no cyanosis


Skin:  no rash





Current Medications








 Medications


  (Trade)  Dose


 Ordered  Sig/Ralen


 Route


 PRN Reason  Start Time


 Stop Time Status Last Admin


Dose Admin


 


 Amlodipine


 Besylate


  (Norvasc)  10 mg  DAILY


 ORAL


   9/9/19 09:00


 10/9/19 08:59  9/12/19 09:08


 


 


 Clonidine HCl


  (Catapres Tab)  0.1 mg  Q4H  PRN


 ORAL


 For High Blood Pressure  9/9/19 13:45


 10/9/19 13:44   


 


 


 Hydralazine HCl


  (Apresoline)  100 mg  Q8HR


 ORAL


   9/9/19 14:00


 10/9/19 05:59  9/13/19 06:03


 


 


 Risperidone


  (RisperDAL)  1 mg  EVERY 6 HOURS  PRN


 ORAL


 agitation  9/9/19 22:30


 10/9/19 22:29   


 


 


 Risperidone


  (RisperDAL)  1.5 mg  BEDTIME


 ORAL


   9/12/19 21:00


 10/12/19 20:59  9/12/19 21:02


 

















Kolton Tristan MD Sep 13, 2019 12:20

## 2019-09-13 NOTE — NUR
NURSE NOTES:

received report from Jose Armando. patient in bed. sleeping. breathing even and unlabored. no 
facial grimacing. IV on LH 20 intact. bed in the lowest position. call light within reach. 
will continue to provide plan of care.

## 2019-09-14 NOTE — NUR
NURSE NOTES:

NP Denise wants me to communicated MD Duenas regarding Aspirin and Plavix medications that MD Duenas indicates its on its note. Patient isn't on those medications and I left a message to 
MD Duenas @[phone] asking if MD wants to order this medications to patient. Waiting 
call back.

## 2019-09-14 NOTE — NUR
NURSE NOTES:

Patient in bed, awake, alert and verbally responsive. Able to make needs known. REspiration 
is even and unlabored. Bed in low and locked position. No complaint of pain or discomfort 
noted. Abdomen is soft and non distended. Bowel sounds noted. IV site noted. Call light is 
at bedside. Will continue plan of care.

## 2019-09-14 NOTE — INTERNAL MED PROGRESS NOTE
Subjective


Date of Service:  Sep 14, 2019


Physician Name


Piero Harman


Attending Physician


Frandy Vuong MD





Current Medications








 Medications


  (Trade)  Dose


 Ordered  Sig/Arlen


 Route


 PRN Reason  Start Time


 Stop Time Status Last Admin


Dose Admin


 


 Amlodipine


 Besylate


  (Norvasc)  10 mg  DAILY


 ORAL


   9/9/19 09:00


 10/9/19 08:59  9/14/19 09:11


 


 


 Clonidine HCl


  (Catapres Tab)  0.1 mg  Q4H  PRN


 ORAL


 For High Blood Pressure  9/9/19 13:45


 10/9/19 13:44   


 


 


 Hydralazine HCl


  (Apresoline)  100 mg  Q8HR


 ORAL


   9/9/19 14:00


 10/9/19 05:59  9/14/19 13:06


 


 


 Risperidone


  (RisperDAL)  1 mg  EVERY 6 HOURS  PRN


 ORAL


 agitation  9/9/19 22:30


 10/9/19 22:29   


 


 


 Risperidone


  (RisperDAL)  1.5 mg  BEDTIME


 ORAL


   9/12/19 21:00


 10/12/19 20:59  9/12/19 21:02


 








Allergies:  


Coded Allergies:  


     No Known Allergies (Unverified , 8/9/19)


ROS Limited/Unobtainable:  No


Constitutional:  Reports: no symptoms


HEENT:  Reports: no symptoms


Cardiovascular:  Reports: no symptoms


Respiratory:  Reports: no symptoms


Gastrointestinal/Abdominal:  Reports: no symptoms


Genitourinary:  Reports: no symptoms


Neurologic/Psychiatric:  Reports: no symptoms


Subjective


84 YO M admitted with altered mental status.  Now paranoia and agitation. Cover 

for Int [facility]-Dr Vuong





Objective





Last Vital Signs








  Date Time  Temp Pulse Resp B/P (MAP) Pulse Ox O2 Delivery O2 Flow Rate FiO2


 


9/14/19 13:06    140/68    


 


9/14/19 12:00 97.5 91 17  98   


 


9/14/19 09:00      Room Air  

















Intake and Output  


 


 9/13/19 9/14/19





 18:59 06:59


 


Intake Total 880 ml 1760 ml


 


Output Total  1800 ml


 


Balance 880 ml -40 ml


 


  


 


Intake Oral 880 ml 1760 ml


 


Output Urine Total  1800 ml


 


# Voids 8 11


 


# Bowel Movements  2








Objective


PHYSICAL EXAMINATION:


GENERAL:  The patient is a well-developed and well nourished thin-appearing


 male, in no apparent distress.


HEENT:  Eyes, pupils are equal and responsive to light and accommodation.


Extraocular movements are intact.


NECK:  Supple without lymphadenopathy.


CHEST:  Lungs are clear to auscultation bilaterally without wheezes or


rales.


CARDIOVASCULAR:  Regular rhythm and rate.  S1 and S2 are normal without


murmurs, rubs, or gallops.


ABDOMEN:  Soft, nontender, and nondistended.  Positive bowel sounds.  No


evidence of hepatosplenomegaly.  Currently, no rebound or guarding


noted.


EXTREMITIES:  Negative for clubbing, cyanosis, or edema.


RECTAL/GENITAL:  Not performed.


NEUROLOGIC:  Cranial nerves II through XII are grossly intact without focal


deficits.  Motor strength is 3/5 on the left and 5/5 on the right.





Assessment/Plan


Assessment/Plan


ASSESSMENT:  This is an 85-year-old  male.





1. Altered mental status.


2. Paranoia.


3. Cerebrovascular disease.


4. Left hemiparesis.


5. Hypertension.





TREATMENT:


1. Altered mental status.  This may be metabolic encephalopathy versus


acute psychosis.  A Psychiatric consultation has been obtained with Dr. Randall.  Continue risperdal per Psychiatry.


2. Cerebrovascular disease.  The patient has been started empirically on


aspirin.


3. Left hemiparesis.


4. Hypertension.  Continue Norvasc as above.


5.  Discharge planning:  Skilled nursing facility











Piero Harman MD Sep 14, 2019 14:51

## 2019-09-14 NOTE — PROGRESS NOTE
DATE:  09/13/2019

SUBJECTIVE:  The patient is less confused.  Continues to be anxious.  The

patient is an elderly gentleman with poor insight.



MENTAL STATUS EXAMINATION:  The patient is alert and oriented times self

and place.  Mood is agitated.  Affect is flat.  Thought process is

concrete.  Thought content, no suicidal or homicidal ideations.



ASSESSMENT:

1. Cognitive impairment.

2. Paranoid ideation.



PLAN:

1. We will continue current medications.

2. Provide the patient with reality orientation and supportive

therapy.









  ______________________________________________

  Libby Randall M.D.





DR:  DWIGHT

D:  09/13/2019 21:52

T:  09/14/2019 01:36

JOB#:  [id number]

CC:

## 2019-09-14 NOTE — PULMONOLOGY PROGRESS NOTE
Assessment/Plan


Assessment/Plan


ASSESSMENT


renal failure, acute on chronic 


HTN 


anemia 


dementia with paranoid ideation


anxiety disorder 


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 


peripheral neuropathy 


hx of CVA   





PLAN OF CARE 


MS floor   


CT head noted 


Carotid Duplex unremarkable 


MRI brain with findings of likely an area of prior petechial cortical 

hemorrhage 


neuro follows ; per neuro: prob posterior circulation disorder,


neuro  added Plavix for 3 months and then  recommended dc, continue ASA ( 

however not on current meds list?) -discussed with nurse to contact neuro if 

still wants these medications 





BP management with Hydralazine and CCB 


DVT prophylaxis 


O2 HHN prn  


DVT prophylaxis  with SCD 


monitor renal parameters lytes, correct lytes prn 


renal US+ echogenic kidneys, c/w medical renal disease


psych follows 


psych meds optimized  


monitor HH with goal to keep Hgb above 7 


supportive care   





case discussed and evaluated by supervising physician





Subjective


Allergies:  


Coded Allergies:  


     No Known Allergies (Unverified , 8/9/19)


Subjective


denies dizziness, SOB, chest pain 


eating breakfast





Objective





Last 24 Hour Vital Signs








  Date Time  Temp Pulse Resp B/P (MAP) Pulse Ox O2 Delivery O2 Flow Rate FiO2


 


9/14/19 08:00 98.1 69 16 140/68 (92) 95   


 


9/14/19 05:59    158/64    


 


9/14/19 04:00 97.8 64 19 158/64 (95) 96   


 


9/14/19 00:00 96.8 98 20 130/95 (107) 96   


 


9/13/19 21:42    131/82    


 


9/13/19 21:00      Room Air  


 


9/13/19 20:00 98.1 123 20 131/82 (98) 99   


 


9/13/19 16:00 98.1 81 18 130/79 (96) 98   


 


9/13/19 12:00 98.0 79 18 126/83 (97) 97   

















Intake and Output  


 


 9/13/19 9/14/19





 19:00 07:00


 


Intake Total 880 ml 1760 ml


 


Output Total  1800 ml


 


Balance 880 ml -40 ml


 


  


 


Intake Oral 880 ml 1760 ml


 


Output Urine Total  1800 ml


 


# Voids 8 11


 


# Bowel Movements  2








General Appearance:  no acute distress, other - awake and responsive AA male


HEENT:  normocephalic, atraumatic, anicteric, mucous membranes moist


Respiratory/Chest:  lungs clear, no respiratory distress, no accessory muscle 

use


Cardiovascular:  normal peripheral pulses, normal rate


Abdomen:  normal bowel sounds, soft, non tender


Extremities:  no edema, pedal pulses normal


Neurologic/Psychiatric:  alert, responsive


Musculoskeletal:  atrophy





Current Medications








 Medications


  (Trade)  Dose


 Ordered  Sig/Arlen


 Route


 PRN Reason  Start Time


 Stop Time Status Last Admin


Dose Admin


 


 Amlodipine


 Besylate


  (Norvasc)  10 mg  DAILY


 ORAL


   9/9/19 09:00


 10/9/19 08:59  9/12/19 09:08


 


 


 Clonidine HCl


  (Catapres Tab)  0.1 mg  Q4H  PRN


 ORAL


 For High Blood Pressure  9/9/19 13:45


 10/9/19 13:44   


 


 


 Hydralazine HCl


  (Apresoline)  100 mg  Q8HR


 ORAL


   9/9/19 14:00


 10/9/19 05:59  9/14/19 05:59


 


 


 Risperidone


  (RisperDAL)  1 mg  EVERY 6 HOURS  PRN


 ORAL


 agitation  9/9/19 22:30


 10/9/19 22:29   


 


 


 Risperidone


  (RisperDAL)  1.5 mg  BEDTIME


 ORAL


   9/12/19 21:00


 10/12/19 20:59  9/12/19 21:02


 

















Denise Gonzales NP Sep 14, 2019 09:10

## 2019-09-15 NOTE — PULMONOLOGY PROGRESS NOTE
Assessment/Plan


Assessment/Plan


ASSESSMENT


renal failure, acute on chronic ; mainly chronic


HTN 


anemia 


dementia with paranoid ideation


anxiety disorder 


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 


peripheral neuropathy 


hx of CVA   





PLAN OF CARE 


MS floor   


CT head noted 


Carotid Duplex unremarkable 


MRI brain with findings of likely an area of prior petechial cortical 

hemorrhage 


neuro follows ; per neuro: prob posterior circulation disorder,


neuro  added Plavix for 3 months and then  recommended dc, continue ASA ( 

however not on current meds list?) -discussed with nurse to contact neuro if 

still wants these medications 





BP management with Hydralazine and CCB 


DVT prophylaxis 


O2 HHN prn  


DVT prophylaxis  with SCD 


monitor renal parameters lytes, correct lytes prn 


renal US+ echogenic kidneys, c/w medical renal disease


psych follows 


psych meds optimized  


monitor HH with goal to keep Hgb above 7 


supportive care   





case discussed and evaluated by supervising physician





Subjective


Allergies:  


Coded Allergies:  


     No Known Allergies (Unverified , 8/9/19)


Subjective


denies dizziness, SOB, chest pain 


pulse ox stable on RA





Objective





Last 24 Hour Vital Signs








  Date Time  Temp Pulse Resp B/P (MAP) Pulse Ox O2 Delivery O2 Flow Rate FiO2


 


9/15/19 09:26  75  150/67    


 


9/15/19 08:45      Room Air  


 


9/15/19 08:00 99.8 75 21 150/67 (94) 98   


 


9/15/19 06:20    148/57    


 


9/15/19 04:00 99.0 80 19 148/57 (87) 98   


 


9/15/19 00:00 99.0 97 19 150/58 (88) 97   


 


9/14/19 21:08    169/69    


 


9/14/19 21:00      Room Air  


 


9/14/19 20:00 99.5 99 19 168/69 (102) 98   


 


9/14/19 16:00 97.8 83 18 118/61 (80) 100   


 


9/14/19 13:06    140/68    


 


9/14/19 12:00 97.5 91 17 111/61 (78) 98   

















Intake and Output  


 


 9/14/19 9/15/19





 19:00 07:00


 


Intake Total 400 ml 400 ml


 


Balance 400 ml 400 ml


 


  


 


Intake Oral 400 ml 400 ml


 


# Voids 2 2


 


# Bowel Movements  1








Objective


General Appearance:  no acute distress awake and responsive AA male


HEENT:  normocephalic, atraumatic, anicteric, mucous membranes moist


Respiratory/Chest:  lungs clear, no respiratory distress, no accessory muscle 

use


Cardiovascular:  normal peripheral pulses, normal rate


Abdomen:  normal bowel sounds, soft, non tender


Extremities:  no edema, pedal pulses normal


Neurologic/Psychiatric:  alert, responsive


Musculoskeletal:  atrophy


Laboratory Tests


9/15/19 06:55: 


Sodium Level 146H, Potassium Level 4.1, Chloride Level 112H, Carbon Dioxide 

Level 23, Anion Gap 11, Blood Urea Nitrogen 39H, Creatinine 2.0H, Estimat 

Glomerular Filtration Rate , Glucose Level 105, Calcium Level 8.8





Current Medications








 Medications


  (Trade)  Dose


 Ordered  Sig/Arlen


 Route


 PRN Reason  Start Time


 Stop Time Status Last Admin


Dose Admin


 


 Amlodipine


 Besylate


  (Norvasc)  10 mg  DAILY


 ORAL


   9/9/19 09:00


 10/9/19 08:59  9/15/19 09:26


 


 


 Clonidine HCl


  (Catapres Tab)  0.1 mg  Q4H  PRN


 ORAL


 For High Blood Pressure  9/9/19 13:45


 10/9/19 13:44   


 


 


 Hydralazine HCl


  (Apresoline)  100 mg  Q8HR


 ORAL


   9/9/19 14:00


 10/9/19 05:59  9/15/19 06:20


 


 


 Risperidone


  (RisperDAL)  1 mg  EVERY 6 HOURS  PRN


 ORAL


 agitation  9/9/19 22:30


 10/9/19 22:29   


 


 


 Risperidone


  (RisperDAL)  1.5 mg  BEDTIME


 ORAL


   9/12/19 21:00


 10/12/19 20:59  9/14/19 21:07


 

















Denise Gonzales NP Sep 15, 2019 10:26

## 2019-09-15 NOTE — NUR
NURSE NOTES:

Patient in bed, awake, alert and verbally responsive. Able to make needs known. Respiration 
is even and unlabored. No complaint of pain or discomfort noted. Abdomen is soft and non 
distended. Respiration is even and unlabored. Skin is warm and dry to touch. NO iv noted, 
primary MD is aware.Bed in low and locked position. Provided safe environment. call light is 
at bedside. Will continue plan of care.

## 2019-09-15 NOTE — NUR
NURSE NOTES:

Pt in bed in low position, call light next to pt, HOB in fowlers position, Iv intact but not 
running any fluids asymptomatic, pt has belongings at bedside, pt should be discharged soon, 
pt stable. Ox4, denies pain, wants to go back to sleep, no s/s of distress or SOB.

## 2019-09-15 NOTE — INTERNAL MED PROGRESS NOTE
Subjective


Date of Service:  Sep 15, 2019


Physician Name


Piero Harman


Attending Physician


Frandy Vuong MD





Current Medications








 Medications


  (Trade)  Dose


 Ordered  Sig/Arlen


 Route


 PRN Reason  Start Time


 Stop Time Status Last Admin


Dose Admin


 


 Amlodipine


 Besylate


  (Norvasc)  10 mg  DAILY


 ORAL


   9/9/19 09:00


 10/9/19 08:59  9/15/19 09:26


 


 


 Clonidine HCl


  (Catapres Tab)  0.1 mg  Q4H  PRN


 ORAL


 For High Blood Pressure  9/9/19 13:45


 10/9/19 13:44   


 


 


 Hydralazine HCl


  (Apresoline)  100 mg  Q8HR


 ORAL


   9/9/19 14:00


 10/9/19 05:59  9/15/19 14:45


 


 


 Risperidone


  (RisperDAL)  1 mg  EVERY 6 HOURS  PRN


 ORAL


 agitation  9/9/19 22:30


 10/9/19 22:29   


 


 


 Risperidone


  (RisperDAL)  1.5 mg  BEDTIME


 ORAL


   9/12/19 21:00


 10/12/19 20:59  9/14/19 21:07


 








Allergies:  


Coded Allergies:  


     No Known Allergies (Unverified , 8/9/19)


ROS Limited/Unobtainable:  No


Constitutional:  Reports: no symptoms


HEENT:  Reports: no symptoms


Cardiovascular:  Reports: no symptoms


Respiratory:  Reports: no symptoms


Gastrointestinal/Abdominal:  Reports: no symptoms


Genitourinary:  Reports: no symptoms


Neurologic/Psychiatric:  Reports: no symptoms


Subjective


86 YO M admitted with altered mental status.  Now paranoia and agitation. Cover 

for Int Layo-Dr Vuong





Objective





Last Vital Signs








  Date Time  Temp Pulse Resp B/P (MAP) Pulse Ox O2 Delivery O2 Flow Rate FiO2


 


9/15/19 14:45    137/56    


 


9/15/19 12:00 97.7 88 20  97   


 


9/15/19 08:45      Room Air  











Laboratory Tests








Test


  9/15/19


06:55


 


Sodium Level


  146 MMOL/L


(136-145)  H


 


Potassium Level


  4.1 MMOL/L


(3.5-5.1)


 


Chloride Level


  112 MMOL/L


()  H


 


Carbon Dioxide Level


  23 MMOL/L


(21-32)


 


Anion Gap


  11 mmol/L


(5-15)


 


Blood Urea Nitrogen


  39 mg/dL


(7-18)  H


 


Creatinine


  2.0 MG/DL


(0.55-1.30)  H


 


Estimat Glomerular Filtration


Rate  mL/min (>60)  


 


 


Glucose Level


  105 MG/DL


()


 


Calcium Level


  8.8 MG/DL


(8.5-10.1)

















Intake and Output  


 


 9/14/19 9/15/19





 18:59 06:59


 


Intake Total 400 ml 400 ml


 


Balance 400 ml 400 ml


 


  


 


Intake Oral 400 ml 400 ml


 


# Voids 2 2


 


# Bowel Movements  1








Objective


PHYSICAL EXAMINATION:


GENERAL:  The patient is a well-developed and well nourished thin-appearing


 male, in no apparent distress.


HEENT:  Eyes, pupils are equal and responsive to light and accommodation.


Extraocular movements are intact.


NECK:  Supple without lymphadenopathy.


CHEST:  Lungs are clear to auscultation bilaterally without wheezes or


rales.


CARDIOVASCULAR:  Regular rhythm and rate.  S1 and S2 are normal without


murmurs, rubs, or gallops.


ABDOMEN:  Soft, nontender, and nondistended.  Positive bowel sounds.  No


evidence of hepatosplenomegaly.  Currently, no rebound or guarding


noted.


EXTREMITIES:  Negative for clubbing, cyanosis, or edema.


RECTAL/GENITAL:  Not performed.


NEUROLOGIC:  Cranial nerves II through XII are grossly intact without focal


deficits.  Motor strength is 3/5 on the left and 5/5 on the right.





Assessment/Plan


Assessment/Plan


ASSESSMENT:  This is an 85-year-old  male.





1. Altered mental status.


2. Paranoia.


3. Cerebrovascular disease.


4. Left hemiparesis.


5. Hypertension.





TREATMENT:


1. Altered mental status.  This may be metabolic encephalopathy versus


acute psychosis.  A Psychiatric consultation has been obtained with Dr. Randall.  Continue risperdal per Psychiatry.


2. Cerebrovascular disease.  The patient has been started empirically on


aspirin.


3. Left hemiparesis.


4. Hypertension.  Continue Norvasc as above.


5.  Discharge planning:  Rehab on [facility]











Piero Harman MD Sep 15, 2019 14:57

## 2019-09-15 NOTE — PROGRESS NOTE
DATE:  09/14/2019

SUBJECTIVE:  The patient is debilitated, depressed, continues to have

anxiety, debilitated, able to answers questions appropriately.



MENTAL STATUS EXAMINATION:  The patient is alert, oriented times, self, and

place.  Mood is anxious.  Affect is flat.  Thought process, there is a

paucity of thought content.  Thought content, no suicidal or homicidal

ideations.



ASSESSMENT:

1. Paranoid ideation.

2. Anxiety disorder.



PLAN:  We will continue to follow and readjust the medications.









  ______________________________________________

  Libby Randall M.D.





DR:  GORGE

D:  09/15/2019 00:53

T:  09/15/2019 03:30

JOB#:  [id number]

CC:

## 2019-09-16 NOTE — NUR
NURSE NOTES:

Received report from LILIANA Suárez.  Patient is AAO x 4.  Patient is eating breakfast in bed.  
Patient is breathing even and unlabored on room air.  Patient has no IV acces and primaryb 
md is aware.  Skin is warm and dry to touch.  Bed in low and locked position. Provided safe 
environment. call light is at bedside. Will continue plan of care.

## 2019-09-16 NOTE — NUR
NURSE NOTES: Refusing to leave hospital: Patient has active discharge order, but refuses to 
leave with ambulance personnel, yelling "I'm not going there! ( in reference to [facility])" Patient states "Someone here stole my Debit Card!". Shouted at  me, asking if "I am 
stupid" because I didn't  pronounce his name correctly". Multiple staff, including myself, 
have offered to assist him with cancelling the card and reordering new card. Also states he 
is missing his pants. Case Management Manager told me that she has offered to obtain pants 
for him.

## 2019-09-16 NOTE — NUR
NURSE NOTES:

Patient moved to Room 408-2 with all of his belongings, including Lap Top and cane, as well 
as a bag of clothing. I explained to patient that we needed to move him to another room to 
allow placement for another patient. He replied "okay" . When I loaded his belongings on his 
bed to move him, he angrily stated "That is NOT all my belongings!!!". I showed him that the 
bedside dresser drawers were completely empty and no other belongings seen. Patient then 
threw his cane in the air, towards myself and primary RN, Ziggy. I asked patient to please 
lower his tone of voice. Upon moving, I asked patient where he lived prior to coming to 
[place] (has discharge order from 9/12 and according to Social Service Notes, is a resident 
at [facility]). Patient started yelling, stating "I am a VISITOR here from [facility]". Patient bed in low position after move from 4062

## 2019-09-16 NOTE — NUR
NURSE NOTES:

Gave report to Milind Peña, nursing supervisor at [facility].

## 2019-09-16 NOTE — PULMONOLOGY PROGRESS NOTE
Assessment/Plan


Problems:  


(1) Renal failure (ARF), acute on chronic


(2) Anemia


(3) Peripheral neuropathy


(4) Agitation


(5) Hypertension


(6) History of CVA (cerebrovascular accident)


(7) History of hypertension


Assessment/Plan


no new complains


pt is asymptomatic


f/u PT/OT recommendations


neuro evaluation


meds evaluated and reviewed


medication reviewed


dc to nursing home today





Subjective


ROS Limited/Unobtainable:  No


Constitutional:  Reports: no symptoms


HEENT:  Repors: no symptoms


Respiratory:  Reports: no symptoms


Allergies:  


Coded Allergies:  


     No Known Allergies (Unverified , 8/9/19)





Objective





Last 24 Hour Vital Signs








  Date Time  Temp Pulse Resp B/P (MAP) Pulse Ox O2 Delivery O2 Flow Rate FiO2


 


9/16/19 09:00      Room Air  


 


9/16/19 08:37  68  96/49    


 


9/16/19 08:00 97.0 68 20 96/49 (65) 98   


 


9/16/19 05:11    154/61    


 


9/16/19 04:00 98.5 75 20 154/64 (94) 96   


 


9/15/19 21:42    160/87    


 


9/15/19 20:25      Room Air  


 


9/15/19 20:00 97.5 92 19 160/87 (111) 97   


 


9/15/19 16:00 98.0 84 21 138/64 (88) 97   


 


9/15/19 14:45    137/56    

















Intake and Output  


 


 9/15/19 9/16/19





 19:00 07:00


 


Intake Total 700 ml 500 ml


 


Output Total 300 ml 


 


Balance 400 ml 500 ml


 


  


 


Intake Oral 700 ml 500 ml


 


Output Urine Total 300 ml 


 


# Voids  3








General Appearance:  cachetic


HEENT:  normocephalic, atraumatic


Respiratory/Chest:  chest wall non-tender, lungs clear


Cardiovascular:  normal peripheral pulses, normal rate


Abdomen:  normal bowel sounds, soft, non tender


Genitourinary:  normal external genitalia


Skin:  no rash





Current Medications








 Medications


  (Trade)  Dose


 Ordered  Sig/Arlen


 Route


 PRN Reason  Start Time


 Stop Time Status Last Admin


Dose Admin


 


 Amlodipine


 Besylate


  (Norvasc)  10 mg  DAILY


 ORAL


   9/9/19 09:00


 10/9/19 08:59  9/15/19 09:26


 


 


 Clonidine HCl


  (Catapres Tab)  0.1 mg  Q4H  PRN


 ORAL


 For High Blood Pressure  9/9/19 13:45


 10/9/19 13:44   


 


 


 Hydralazine HCl


  (Apresoline)  100 mg  Q8HR


 ORAL


   9/9/19 14:00


 10/9/19 05:59  9/16/19 05:11


 


 


 Risperidone


  (RisperDAL)  1 mg  EVERY 6 HOURS  PRN


 ORAL


 agitation  9/9/19 22:30


 10/9/19 22:29   


 


 


 Risperidone


  (RisperDAL)  1.5 mg  BEDTIME


 ORAL


   9/12/19 21:00


 10/12/19 20:59  9/14/19 21:07


 

















Kolton Tristan MD Sep 16, 2019 13:07

## 2019-09-16 NOTE — NUR
NURSE NOTES:

@3338:  spoke with Dr. Tristan and  about patient refusal to leave hospital per 
discharge order.

-------------------------------------------------------------------------------

Addendum: 09/16/19 at 1400 by Ziggy Gomes RN

-------------------------------------------------------------------------------

Order received and will be entered.

## 2019-09-16 NOTE — NUR
Nursing Home Discharge:



Patient is being discharged from medical care.  Awake, alert and oriented x 4.  Patient 
states concerns about belongings.  Spoke to patient about pants and shoe being replaced at 
[facility] according to [facility]'s representative.   Patient refused to signed 
discharge paper and belonging checklist. All medical devices such as IV and ID band were 
removed.  Patient left on gurney with personal belongings to ambulance.  Patient will be 
going to [facility].

## 2019-09-16 NOTE — DISCHARGE SUMMARY
Discharge Summary


Discharge Summary


_


DATE OF ADMISSION: 9/08/2019





DATE OF DISCHARGE: 9/16/2019








DISCHARGED BY: Dr. Vuong





REASON FOR ADMISSION: 


85 years old male, resident of skilled nursing facility, with past medical 

history of hypertension, CVA, presented with chief complaint of numbness in his 

lower extremities.  


Patient woke up and felt numb to his lower extremity.  


Patient reported that he thought he was poisoned by the nursing home staff.  


Patient stated that he received nifedipine and heparin prior to his symptoms  


He denied any focal deficit.  


No nausea  or vomiting.  


No fever or chills.  


Per nursing staff , patient was aggressive and required psychiatric evaluation.


Upon evaluation vital signs revealed elevated blood pressure 182/80.  


Laboratory work-up revealed no leukocytosis, hemoglobin 11.6 ,hematocrit 34.8.  


Platelet count 223.


BUN 33, creatinine 1.9.  


Glucose 110.  


Urinalysis revealed no evidence of urinary tract infection.  


CT of the head demonstrated stable volume loss, chronic microvascular ischemic 

changes, and right frontal encephalomalacia.  


No evidence of acute intracranial findings.  


No depressed calvarial fracture.  


Patient subsequently admitted for further management.


 


CONSULTANTS:


neurologist Dr. Duenas


hospitalist Dr. Tristan


psychiatrist 


 


[facility] COURSE: 


Patient admitted to medical surgical floor.  


Carotid duplex was unremarkable.  


MRI of the brain revealed findings of likely area of prior petechial cortical 

hemorrhage.  


Neurologist followed. 


Per neurologist patient probably had posterior circulation disorder.  

Neurologist recommended Plavix for 3 months and then  discontinue, and  

continue aspirin .


Blood pressure was managed with hydralazine and calcium channel blocker.  


DVT prophylaxis provided.  


Supplemental oxygen and bronchodilator treatment were on board as needed.  


Pulse oximetry was stable on room air.  


DVT prophylaxis with SCD provided.  


Renal parameters  and electrolytes were closely monitored.  


Electrolytes corrected as needed and nephrotoxins were avoided. 


Renal ultrasound revealed echogenic kidneys, consistent with medical renal 

disease.  


Psychiatrist follow.  


Psychiatrist diagnosed patient with dementia with paranoid ideation as well as 

anxiety disorder.  


Psychiatric medication regimen was optimized.  


Hemoglobin  and hematocrit were closely monitored with  goal to keep hemoglobin 

above 7.  


Supportive care provided.  


Patient clinically stabilized and was ready for transfer back to skilled 

nursing [facility] for continuation of care.





FINAL DIAGNOSES: 


Renal failure, acute on chronic


Dementia with paranoid ideation


Anxiety disorder


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


Hypertension


Anemia


Peripheral neuropathy


History of CVA





DISCHARGE MEDICATIONS:


See Medication Reconciliation list.





DISCHARGE INSTRUCTIONS:


Patient was discharged to the skilled nursing facility.


Follow up with medical doctor at the facility.











Denise Gonzales NP Sep 16, 2019 18:55

## 2019-09-16 NOTE — NUR
NURSE NOTES:

Patient is anxious and suspicious of CNA and RN's. Reality orientation was done with 
patient. Patient denies of current situation. Very suspicious of nurse giving medication. 
Charge nurse is aware.

## 2019-09-16 NOTE — PROGRESS NOTE
DATE:  09/16/2019

SUBJECTIVE:  The patient is in bed, continues to be paranoid. Agreed to go

to [facility].  The patient is paranoid and has cognitive impairment.

Poor insight into his current condition.



MENTAL STATUS EXAMINATION:  The patient is alert and oriented x2.  Mood is

irritable.  Affect is constricted.  Congruent with mood.  Thought process

is concrete.  Thought content, positive for paranoid ideation.  Insight

and judgment is poor.



ASSESSMENT:

1. Psychotic disorder.

2. Cognitive impairment.

3. Anxiety.



PLAN:  We will continue the current medication.  Provide the patient with

reality orientation and supportive therapy.









  ______________________________________________

  Libby Randall M.D.





DR:  Mendel

D:  09/16/2019 16:14

T:  09/16/2019 19:59

JOB#:  [id number]

CC:

## 2019-09-16 NOTE — NUR
RD ASSESSMENT & RECOMMENDATIONS

SEE CARE ACTIVITY FOR COMPLETE ASSESSMENT



DAILY ESTIMATED NEEDS:

Needs based on Cardiac 63kg 

25-30  kcals/kg 

2590-4754  total kcals

1-1.3  g protein/kg

63-82  g total protein 

25-30  mL/kg

3853-4675  total fluid mLs



NUTRITION DIAGNOSIS:

* Decreased sodium needs r/t cardiac history, ARF as evidenced by h/o CVA,

elev BPs, elev creat (1.8-> 2.0), elev BUN 39, elev NA (146).



CURRENT DIET:CARDIAC    





PO DIET RECOMMENDATIONS:

LOW NA/ texture as tolerated  





ADDITIONAL RECOMMENDATIONS:

1) Standing weight for accurate CBW 

2) Monitor tolerance to current texture, need to downgrade 

        -> edentulous, h/o CVA/ pt denies any chewing/swallowing deficits  

             at this time 

3) Ensure Enlive 1 bottle daily (350kcal/20g prot) w/ variable PO intake 

4) IV fluids for possible water deficits (elev na, BUN, creat)

## 2019-09-16 NOTE — NUR
NURSE NOTES:

@12:30-  Transport came to  patient.  Patient refused to go on [facility].  Patient 
states he need all belongings. Transport left at 12:50pm without patient.  Nursing 
supervisor and charge nurse were aware.

## 2021-12-15 NOTE — NUR
HAND-OFF: 

Report given to LILIANA Swenson. Patient in stable condition. 0 = understands/communicates without difficulty
